# Patient Record
Sex: FEMALE | Race: WHITE | NOT HISPANIC OR LATINO | Employment: UNEMPLOYED | URBAN - METROPOLITAN AREA
[De-identification: names, ages, dates, MRNs, and addresses within clinical notes are randomized per-mention and may not be internally consistent; named-entity substitution may affect disease eponyms.]

---

## 2020-05-06 ENCOUNTER — TELEPHONE (OUTPATIENT)
Dept: OBSTETRICS AND GYNECOLOGY | Facility: CLINIC | Age: 30
End: 2020-05-06

## 2020-05-06 DIAGNOSIS — Z36.3 ENCOUNTER FOR ANTENATAL SCREENING FOR MALFORMATION USING ULTRASOUND: Primary | ICD-10-CM

## 2020-05-06 NOTE — TELEPHONE ENCOUNTER
----- Message from Emily Peoples sent at 5/6/2020 10:56 AM CDT -----  Contact: Self  Type:  Patient Returning Call    Who Called: SOHA STOCK [[id number]]    Who Left Message for Patient: Soha    Does the patient know what this is regarding?:Y    Would the patient rather a call back or a response via [facility]? Call    Best Call Back Number:[phone]    Additional Information:

## 2020-05-06 NOTE — TELEPHONE ENCOUNTER
----- Message from Soha Cedeno MD sent at 5/5/2020 10:23 AM CDT -----  Hi,  She is a pregnancy confirmation. Can we change to VV with tomi mason? Thanks!  Soha

## 2020-05-11 ENCOUNTER — INITIAL PRENATAL (OUTPATIENT)
Dept: OBSTETRICS AND GYNECOLOGY | Facility: CLINIC | Age: 30
End: 2020-05-11
Payer: MEDICAID

## 2020-05-11 ENCOUNTER — PROCEDURE VISIT (OUTPATIENT)
Dept: OBSTETRICS AND GYNECOLOGY | Facility: CLINIC | Age: 30
End: 2020-05-11
Payer: MEDICAID

## 2020-05-11 ENCOUNTER — LAB VISIT (OUTPATIENT)
Dept: LAB | Facility: OTHER | Age: 30
End: 2020-05-11
Attending: OBSTETRICS & GYNECOLOGY
Payer: MEDICAID

## 2020-05-11 VITALS — DIASTOLIC BLOOD PRESSURE: 70 MMHG | SYSTOLIC BLOOD PRESSURE: 118 MMHG | WEIGHT: 177 LBS

## 2020-05-11 DIAGNOSIS — O99.341 DEPRESSION DURING PREGNANCY IN FIRST TRIMESTER: ICD-10-CM

## 2020-05-11 DIAGNOSIS — Z34.90 ENCOUNTER FOR SUPERVISION OF NORMAL PREGNANCY, ANTEPARTUM, UNSPECIFIED GRAVIDITY: Primary | ICD-10-CM

## 2020-05-11 DIAGNOSIS — Z34.90 ENCOUNTER FOR SUPERVISION OF NORMAL PREGNANCY, ANTEPARTUM, UNSPECIFIED GRAVIDITY: ICD-10-CM

## 2020-05-11 DIAGNOSIS — F32.A DEPRESSION DURING PREGNANCY IN FIRST TRIMESTER: ICD-10-CM

## 2020-05-11 DIAGNOSIS — F17.200 SMOKER: ICD-10-CM

## 2020-05-11 DIAGNOSIS — Z36.3 ENCOUNTER FOR ANTENATAL SCREENING FOR MALFORMATION USING ULTRASOUND: ICD-10-CM

## 2020-05-11 LAB
ABO + RH BLD: NORMAL
BASOPHILS # BLD AUTO: 0.02 K/UL (ref 0–0.2)
BASOPHILS NFR BLD: 0.4 % (ref 0–1.9)
BLD GP AB SCN CELLS X3 SERPL QL: NORMAL
DIFFERENTIAL METHOD: NORMAL
EOSINOPHIL # BLD AUTO: 0.1 K/UL (ref 0–0.5)
EOSINOPHIL NFR BLD: 0.9 % (ref 0–8)
ERYTHROCYTE [DISTWIDTH] IN BLOOD BY AUTOMATED COUNT: 13.2 % (ref 11.5–14.5)
ESTIMATED AVG GLUCOSE: 94 MG/DL (ref 68–131)
HBA1C MFR BLD HPLC: 4.9 % (ref 4–5.6)
HCT VFR BLD AUTO: 38.7 % (ref 37–48.5)
HGB BLD-MCNC: 12.7 G/DL (ref 12–16)
IMM GRANULOCYTES # BLD AUTO: 0.01 K/UL (ref 0–0.04)
IMM GRANULOCYTES NFR BLD AUTO: 0.2 % (ref 0–0.5)
LYMPHOCYTES # BLD AUTO: 1.8 K/UL (ref 1–4.8)
LYMPHOCYTES NFR BLD: 32.8 % (ref 18–48)
MCH RBC QN AUTO: 29.6 PG (ref 27–31)
MCHC RBC AUTO-ENTMCNC: 32.8 G/DL (ref 32–36)
MCV RBC AUTO: 90 FL (ref 82–98)
MONOCYTES # BLD AUTO: 0.4 K/UL (ref 0.3–1)
MONOCYTES NFR BLD: 7.9 % (ref 4–15)
NEUTROPHILS # BLD AUTO: 3.1 K/UL (ref 1.8–7.7)
NEUTROPHILS NFR BLD: 57.8 % (ref 38–73)
NRBC BLD-RTO: 0 /100 WBC
PLATELET # BLD AUTO: 272 K/UL (ref 150–350)
PMV BLD AUTO: 9.9 FL (ref 9.2–12.9)
RBC # BLD AUTO: 4.29 M/UL (ref 4–5.4)
WBC # BLD AUTO: 5.42 K/UL (ref 3.9–12.7)

## 2020-05-11 PROCEDURE — 99999 PR PBB SHADOW E&M-EST. PATIENT-LVL II: CPT | Mod: PBBFAC,,, | Performed by: OBSTETRICS & GYNECOLOGY

## 2020-05-11 PROCEDURE — 83036 HEMOGLOBIN GLYCOSYLATED A1C: CPT

## 2020-05-11 PROCEDURE — 87491 CHLMYD TRACH DNA AMP PROBE: CPT

## 2020-05-11 PROCEDURE — 99204 PR OFFICE/OUTPT VISIT, NEW, LEVL IV, 45-59 MIN: ICD-10-PCS | Mod: TH,S$PBB,, | Performed by: OBSTETRICS & GYNECOLOGY

## 2020-05-11 PROCEDURE — 88175 CYTOPATH C/V AUTO FLUID REDO: CPT

## 2020-05-11 PROCEDURE — 86703 HIV-1/HIV-2 1 RESULT ANTBDY: CPT

## 2020-05-11 PROCEDURE — 86592 SYPHILIS TEST NON-TREP QUAL: CPT

## 2020-05-11 PROCEDURE — 85025 COMPLETE CBC W/AUTO DIFF WBC: CPT

## 2020-05-11 PROCEDURE — 36415 COLL VENOUS BLD VENIPUNCTURE: CPT

## 2020-05-11 PROCEDURE — 86762 RUBELLA ANTIBODY: CPT

## 2020-05-11 PROCEDURE — 83020 HEMOGLOBIN ELECTROPHORESIS: CPT

## 2020-05-11 PROCEDURE — 76801 OB US < 14 WKS SINGLE FETUS: CPT | Mod: 26,S$PBB,, | Performed by: OBSTETRICS & GYNECOLOGY

## 2020-05-11 PROCEDURE — 99204 OFFICE O/P NEW MOD 45 MIN: CPT | Mod: TH,S$PBB,, | Performed by: OBSTETRICS & GYNECOLOGY

## 2020-05-11 PROCEDURE — 99212 OFFICE O/P EST SF 10 MIN: CPT | Mod: PBBFAC,TH,25 | Performed by: OBSTETRICS & GYNECOLOGY

## 2020-05-11 PROCEDURE — 87340 HEPATITIS B SURFACE AG IA: CPT

## 2020-05-11 PROCEDURE — 76801 PR US, OB <14WKS, TRANSABD, SINGLE GESTATION: ICD-10-PCS | Mod: 26,S$PBB,, | Performed by: OBSTETRICS & GYNECOLOGY

## 2020-05-11 PROCEDURE — 99999 PR PBB SHADOW E&M-EST. PATIENT-LVL II: ICD-10-PCS | Mod: PBBFAC,,, | Performed by: OBSTETRICS & GYNECOLOGY

## 2020-05-11 PROCEDURE — 76801 OB US < 14 WKS SINGLE FETUS: CPT | Mod: PBBFAC | Performed by: OBSTETRICS & GYNECOLOGY

## 2020-05-11 PROCEDURE — 86901 BLOOD TYPING SEROLOGIC RH(D): CPT

## 2020-05-11 PROCEDURE — 87086 URINE CULTURE/COLONY COUNT: CPT

## 2020-05-11 RX ORDER — BUPROPION HYDROCHLORIDE 150 MG/1
300 TABLET ORAL
COMMUNITY
End: 2020-08-05

## 2020-05-11 RX ORDER — CYANOCOBALAMIN (VITAMIN B-12) 500 MCG
TABLET ORAL
COMMUNITY
End: 2023-01-11

## 2020-05-11 NOTE — PROGRESS NOTES
HPI: Pt is a 29 y.o. female who presents complaining of missed menses and (+) home UPT. She denies vaginal bleeding or abdominal pain. She does have nausea and no vomiting. 8+3 weeks on US this AM. EDC 20.    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   SKIN: Denies rash or lesions.   HEAD: Denies head injury or headache.   NODES: Denies enlarged lymph nodes.   CHEST: Denies chest pain or shortness of breath.   CARDIOVASCULAR: Denies palpitations or left sided chest pain.   ABDOMEN: No abdominal pain, constipation, diarrhea or rectal bleeding.   URINARY: No dysuria, hematuria, or burning on urination.  REPRODUCTIVE: See HPI.   BREASTS: Denies pain, lumps, or nipple discharge.   HEMATOLOGIC: No easy bruisability or excessive bleeding.   MUSCULOSKELETAL: Denies joint pain or swelling.   NEUROLOGIC: Denies syncope or weakness.   PSYCHIATRIC: Denies depression, anxiety or mood swings.    PE:   APPEARANCE: Well nourished, well developed, in no acute distress.  AFFECT: WNL, alert and oriented x 3.  SKIN: No acne or hirsutism.  NECK: Neck symmetric, without masses or thyromegaly.  NODES: No inguinal, cervical, axillary or femoral lymph node enlargement.  CHEST: Good respiratory effort.   ABDOMEN: Soft. No tenderness or masses. No hepatosplenomegaly. No hernias.  PELVIC: External female genitalia without lesions. Female hair distribution. Adequate perineal body, Normal urethral meatus. Vagina moist and well rugated without lesions or discharge. There is no significant  cystocele or rectocele. Cervix pink without lesions, discharge or tenderness.   EXTREMITIES: No edema.    PROCEDURES:  - UPT: positive  - Urine dip: negative  - Dating U/S: done      Diagnosis:  1. Encounter for supervision of normal pregnancy, antepartum, unspecified         Plan:     Orders Placed This Encounter    Urine culture    C. trachomatis/N. gonorrhoeae by AMP DNA    HIV 1/2 Ag/Ab (4th Gen)    RPR    Hemoglobin Electrophoresis,Hgb A2 Maykel.    Hepatitis B Surface Antigen    Rubella Antibody, IgG    Hemoglobin A1C    CBC auto differential    Connected MOM Enrollment    Type & Screen    Liquid-Based Pap Smear, Screening    Assign Connected MOM Program Consent Questionnaire     - She is considering genetic testing and will let us know.     Patient was counseled today on routine 1st trimester precautions, including vaginal bleeding and abdominal pain. We also discussed proper weight gain based on the [employer]'s recommendations based on her pre-pregnancy weight, foods to avoid in pregnancy (i.e. sushi, fish that are high in mercury, cold deli meat, and unpasteurized cheeses), environmental precautions such as cat litter, and prenatal vitamin options (i.e. stool softener, DHA).     Total face to face time spent with the patient was 30 minutes and over half spent in counseling on the above related issues.     Advised to stop smoking, risks of this in pregnancy discussed.     Considering seeing midwives - discussed if she wants this would need to transfer to their practice.     Follow-up with me in 4 weeks for OB check - virtual. Connected mom supplies ordered.

## 2020-05-12 LAB
BACTERIA UR CULT: NO GROWTH
FINAL PATHOLOGIC DIAGNOSIS: NORMAL
HBV SURFACE AG SERPL QL IA: NEGATIVE
HIV 1+2 AB+HIV1 P24 AG SERPL QL IA: NEGATIVE
Lab: NORMAL
RPR SER QL: NORMAL
RUBV IGG SER-ACNC: 43.4 IU/ML
RUBV IGG SER-IMP: REACTIVE

## 2020-05-13 LAB
HGB A2 MFR BLD HPLC: 2.5 % (ref 2.2–3.2)
HGB FRACT BLD ELPH-IMP: NORMAL
HGB FRACT BLD ELPH-IMP: NORMAL

## 2020-05-14 ENCOUNTER — PATIENT MESSAGE (OUTPATIENT)
Dept: OBSTETRICS AND GYNECOLOGY | Facility: CLINIC | Age: 30
End: 2020-05-14

## 2020-05-14 ENCOUNTER — TELEPHONE (OUTPATIENT)
Dept: OBSTETRICS AND GYNECOLOGY | Facility: CLINIC | Age: 30
End: 2020-05-14

## 2020-05-14 LAB
C TRACH DNA SPEC QL NAA+PROBE: NOT DETECTED
N GONORRHOEA DNA SPEC QL NAA+PROBE: NOT DETECTED

## 2020-05-14 NOTE — TELEPHONE ENCOUNTER
----- Message from Nahomy Mercado sent at 5/14/2020 10:22 AM CDT -----  Contact: patient  Patient is calling sent a message thru my [facility] and would like for Dr Jeansonne to read it she states it is personal.  If possible can she respond to her on  [facility] message or call her what ever is easier.      Patient can be reached at [phone]

## 2020-05-16 ENCOUNTER — PATIENT MESSAGE (OUTPATIENT)
Dept: ADMINISTRATIVE | Facility: OTHER | Age: 30
End: 2020-05-16

## 2020-05-19 ENCOUNTER — TELEPHONE (OUTPATIENT)
Dept: OBSTETRICS AND GYNECOLOGY | Facility: CLINIC | Age: 30
End: 2020-05-19

## 2020-05-19 NOTE — TELEPHONE ENCOUNTER
----- Message from Eugene Smith sent at 5/19/2020  2:18 PM CDT -----  Contact: pt  Name of Who is Calling: pt    What is the request in detail: is requesting to schedule a maternity 21 test. Please contact to further discuss and advise      Can the clinic reply by [facility]: no    What Number to Call Back if not in [facility]: [phone]

## 2020-05-26 ENCOUNTER — PATIENT MESSAGE (OUTPATIENT)
Dept: OBSTETRICS AND GYNECOLOGY | Facility: CLINIC | Age: 30
End: 2020-05-26

## 2020-05-27 DIAGNOSIS — Z34.01 ENCOUNTER FOR SUPERVISION OF NORMAL FIRST PREGNANCY IN FIRST TRIMESTER: Primary | ICD-10-CM

## 2020-06-01 ENCOUNTER — TELEPHONE (OUTPATIENT)
Dept: OBSTETRICS AND GYNECOLOGY | Facility: CLINIC | Age: 30
End: 2020-06-01

## 2020-06-08 ENCOUNTER — OFFICE VISIT (OUTPATIENT)
Dept: OBSTETRICS AND GYNECOLOGY | Facility: CLINIC | Age: 30
End: 2020-06-08
Payer: MEDICAID

## 2020-06-08 ENCOUNTER — TELEPHONE (OUTPATIENT)
Dept: OBSTETRICS AND GYNECOLOGY | Facility: CLINIC | Age: 30
End: 2020-06-08

## 2020-06-08 DIAGNOSIS — F17.200 SMOKER: ICD-10-CM

## 2020-06-08 DIAGNOSIS — O99.341 DEPRESSION DURING PREGNANCY IN FIRST TRIMESTER: ICD-10-CM

## 2020-06-08 DIAGNOSIS — F32.A DEPRESSION DURING PREGNANCY IN FIRST TRIMESTER: ICD-10-CM

## 2020-06-08 DIAGNOSIS — Z34.01 ENCOUNTER FOR SUPERVISION OF NORMAL FIRST PREGNANCY IN FIRST TRIMESTER: Primary | ICD-10-CM

## 2020-06-08 PROBLEM — Z34.90 PREGNANCY WITH ONE FETUS, ANTEPARTUM: Status: ACTIVE | Noted: 2020-06-08

## 2020-06-08 PROCEDURE — 99213 PR OFFICE/OUTPT VISIT, EST, LEVL III, 20-29 MIN: ICD-10-PCS | Mod: TH,95,, | Performed by: OBSTETRICS & GYNECOLOGY

## 2020-06-08 PROCEDURE — 99213 OFFICE O/P EST LOW 20 MIN: CPT | Mod: TH,95,, | Performed by: OBSTETRICS & GYNECOLOGY

## 2020-06-08 NOTE — PROGRESS NOTES
The patient location is: home  The chief complaint leading to consultation is: SAVI    Visit type: audiovisual    Face to Face time with patient: 15 min  20 minutes of total time spent on the encounter, which includes face to face time and non-face to face time preparing to see the patient (eg, review of tests), Obtaining and/or reviewing separately obtained history, Documenting clinical information in the electronic or other health record, Independently interpreting results (not separately reported) and communicating results to the patient/family/caregiver, or Care coordination (not separately reported).         Each patient to whom he or she provides medical services by telemedicine is:  (1) informed of the relationship between the physician and patient and the respective role of any other health care provider with respect to management of the patient; and (2) notified that he or she may decline to receive medical services by telemedicine and may withdraw from such care at any time.    Notes:   No LOF, Ctx, VB. Wellbutrin increased to 300mg daily recently by psych, doing well on this dose. Had neg Mat21, wanted NT - doing that next week. Wants AFP after 15 weeks. Has apt with midwife today for consult. Discussed if she wants to continue care with them we will transfer apts to them, pt will let us know. She is interested in . Rarely smoking now. Advised to stop completely. Precautions given.

## 2020-06-08 NOTE — TELEPHONE ENCOUNTER
----- Message from Julie R. Jeansonne, MD sent at 6/8/2020  9:47 AM CDT -----  Regarding: apt  Please schedule for in-office visit with me in 4 weeks. Please also schedule her to get AFP lab that day.    Thanks!

## 2020-06-10 ENCOUNTER — PROCEDURE VISIT (OUTPATIENT)
Dept: MATERNAL FETAL MEDICINE | Facility: CLINIC | Age: 30
End: 2020-06-10
Payer: MEDICAID

## 2020-06-10 DIAGNOSIS — Z36.82 ENCOUNTER FOR NUCHAL TRANSLUCENCY TESTING: ICD-10-CM

## 2020-06-10 DIAGNOSIS — Z34.01 ENCOUNTER FOR SUPERVISION OF NORMAL FIRST PREGNANCY IN FIRST TRIMESTER: ICD-10-CM

## 2020-06-10 PROCEDURE — 76813 OB US NUCHAL MEAS 1 GEST: CPT | Mod: 26,S$PBB,, | Performed by: OBSTETRICS & GYNECOLOGY

## 2020-06-10 PROCEDURE — 76813 PR US, OB NUCHAL, TRANSABDOM/TRANSVAG, FIRST GESTATION: ICD-10-PCS | Mod: 26,S$PBB,, | Performed by: OBSTETRICS & GYNECOLOGY

## 2020-06-10 PROCEDURE — 76813 OB US NUCHAL MEAS 1 GEST: CPT | Mod: PBBFAC | Performed by: OBSTETRICS & GYNECOLOGY

## 2020-07-06 ENCOUNTER — LAB VISIT (OUTPATIENT)
Dept: LAB | Facility: OTHER | Age: 30
End: 2020-07-06
Attending: OBSTETRICS & GYNECOLOGY
Payer: MEDICAID

## 2020-07-06 ENCOUNTER — ROUTINE PRENATAL (OUTPATIENT)
Dept: OBSTETRICS AND GYNECOLOGY | Facility: CLINIC | Age: 30
End: 2020-07-06
Payer: MEDICAID

## 2020-07-06 VITALS — DIASTOLIC BLOOD PRESSURE: 66 MMHG | WEIGHT: 188.69 LBS | SYSTOLIC BLOOD PRESSURE: 106 MMHG

## 2020-07-06 DIAGNOSIS — Z34.02 ENCOUNTER FOR SUPERVISION OF NORMAL FIRST PREGNANCY IN SECOND TRIMESTER: ICD-10-CM

## 2020-07-06 DIAGNOSIS — Z34.02 ENCOUNTER FOR SUPERVISION OF NORMAL FIRST PREGNANCY IN SECOND TRIMESTER: Primary | ICD-10-CM

## 2020-07-06 PROCEDURE — 99999 PR PBB SHADOW E&M-EST. PATIENT-LVL II: ICD-10-PCS | Mod: PBBFAC,,, | Performed by: OBSTETRICS & GYNECOLOGY

## 2020-07-06 PROCEDURE — 99999 PR PBB SHADOW E&M-EST. PATIENT-LVL II: CPT | Mod: PBBFAC,,, | Performed by: OBSTETRICS & GYNECOLOGY

## 2020-07-06 PROCEDURE — 99213 OFFICE O/P EST LOW 20 MIN: CPT | Mod: TH,S$PBB,, | Performed by: OBSTETRICS & GYNECOLOGY

## 2020-07-06 PROCEDURE — 99213 PR OFFICE/OUTPT VISIT, EST, LEVL III, 20-29 MIN: ICD-10-PCS | Mod: TH,S$PBB,, | Performed by: OBSTETRICS & GYNECOLOGY

## 2020-07-06 PROCEDURE — 36415 COLL VENOUS BLD VENIPUNCTURE: CPT

## 2020-07-06 PROCEDURE — 82105 ALPHA-FETOPROTEIN SERUM: CPT

## 2020-07-06 PROCEDURE — 99212 OFFICE O/P EST SF 10 MIN: CPT | Mod: PBBFAC,TH | Performed by: OBSTETRICS & GYNECOLOGY

## 2020-07-06 NOTE — PROGRESS NOTES
No LOF, Ctx, VB. Doing well on wellbutrin 300mg. Seeing psych every 3 months. Partner with her today. Anatomy scheduled. Mat 21 was nl, girl. AFP today. Has lymphadenopathy right groin, reassured. Precautions given.

## 2020-07-09 LAB
# FETUSES US: NORMAL
AFP INTERPRETATION: NORMAL
AFP MOM SERPL: 1.2
AFP SERPL-MCNC: 35.6 NG/ML
AFP SERPL-MCNC: NEGATIVE NG/ML
AGE AT DELIVERY: 30
GA (DAYS): 3 D
GA (WEEKS): 16 WK
GESTATIONAL AGE METHOD: NORMAL
IDDM PATIENT QL: NORMAL
SMOKING STATUS FTND: NO

## 2020-07-24 ENCOUNTER — LAB VISIT (OUTPATIENT)
Dept: LAB | Facility: OTHER | Age: 30
End: 2020-07-24
Attending: OBSTETRICS & GYNECOLOGY
Payer: MEDICAID

## 2020-07-24 DIAGNOSIS — R30.0 DYSURIA DURING PREGNANCY IN SECOND TRIMESTER: Primary | ICD-10-CM

## 2020-07-24 DIAGNOSIS — R30.0 DYSURIA DURING PREGNANCY IN SECOND TRIMESTER: ICD-10-CM

## 2020-07-24 DIAGNOSIS — O26.892 DYSURIA DURING PREGNANCY IN SECOND TRIMESTER: ICD-10-CM

## 2020-07-24 DIAGNOSIS — O26.892 DYSURIA DURING PREGNANCY IN SECOND TRIMESTER: Primary | ICD-10-CM

## 2020-07-24 PROCEDURE — 87086 URINE CULTURE/COLONY COUNT: CPT

## 2020-07-26 LAB — BACTERIA UR CULT: NO GROWTH

## 2020-07-27 ENCOUNTER — PROCEDURE VISIT (OUTPATIENT)
Dept: MATERNAL FETAL MEDICINE | Facility: CLINIC | Age: 30
End: 2020-07-27
Payer: MEDICAID

## 2020-07-27 DIAGNOSIS — O35.03X0 CHOROID PLEXUS CYST OF FETUS AFFECTING CARE OF MOTHER, ANTEPARTUM, SINGLE OR UNSPECIFIED FETUS: ICD-10-CM

## 2020-07-27 DIAGNOSIS — Z34.01 ENCOUNTER FOR SUPERVISION OF NORMAL FIRST PREGNANCY IN FIRST TRIMESTER: ICD-10-CM

## 2020-07-27 PROCEDURE — 76811 PR US, OB FETAL EVAL & EXAM, TRANSABDOM,FIRST GESTATION: ICD-10-PCS | Mod: 26,S$PBB,, | Performed by: OBSTETRICS & GYNECOLOGY

## 2020-07-27 PROCEDURE — 76811 OB US DETAILED SNGL FETUS: CPT | Mod: 26,S$PBB,, | Performed by: OBSTETRICS & GYNECOLOGY

## 2020-07-27 PROCEDURE — 76811 OB US DETAILED SNGL FETUS: CPT | Mod: PBBFAC | Performed by: OBSTETRICS & GYNECOLOGY

## 2020-07-27 NOTE — PROGRESS NOTES
Please see imaging tab for Viewpoint study performed today  See targeted US note   A possible small resolving CP cyst was seen.  M 21 testing was normal.  No fetal structural abnormalities were seen.  No additional followup is needed.

## 2020-08-05 ENCOUNTER — ROUTINE PRENATAL (OUTPATIENT)
Dept: OBSTETRICS AND GYNECOLOGY | Facility: CLINIC | Age: 30
End: 2020-08-05
Payer: MEDICAID

## 2020-08-05 VITALS — SYSTOLIC BLOOD PRESSURE: 110 MMHG | WEIGHT: 199.31 LBS | DIASTOLIC BLOOD PRESSURE: 60 MMHG

## 2020-08-05 DIAGNOSIS — Z34.02 ENCOUNTER FOR SUPERVISION OF NORMAL FIRST PREGNANCY IN SECOND TRIMESTER: Primary | ICD-10-CM

## 2020-08-05 PROCEDURE — 99212 OFFICE O/P EST SF 10 MIN: CPT | Mod: PBBFAC,TH | Performed by: OBSTETRICS & GYNECOLOGY

## 2020-08-05 PROCEDURE — 99213 PR OFFICE/OUTPT VISIT, EST, LEVL III, 20-29 MIN: ICD-10-PCS | Mod: TH,S$PBB,, | Performed by: OBSTETRICS & GYNECOLOGY

## 2020-08-05 PROCEDURE — 99999 PR PBB SHADOW E&M-EST. PATIENT-LVL II: CPT | Mod: PBBFAC,,, | Performed by: OBSTETRICS & GYNECOLOGY

## 2020-08-05 PROCEDURE — 99213 OFFICE O/P EST LOW 20 MIN: CPT | Mod: TH,S$PBB,, | Performed by: OBSTETRICS & GYNECOLOGY

## 2020-08-05 PROCEDURE — 99999 PR PBB SHADOW E&M-EST. PATIENT-LVL II: ICD-10-PCS | Mod: PBBFAC,,, | Performed by: OBSTETRICS & GYNECOLOGY

## 2020-08-05 RX ORDER — BUPROPION HYDROCHLORIDE 300 MG/1
TABLET ORAL
COMMUNITY
Start: 2020-08-04

## 2020-08-05 NOTE — PROGRESS NOTES
Good FM, no LOF, Ctx, VB.   Taking valtrex, no lesions. Glucose, tdap, cbc next visit. Taking wellbutrin 300, not smoking. Precautions given.

## 2020-08-31 ENCOUNTER — PROCEDURE VISIT (OUTPATIENT)
Dept: MATERNAL FETAL MEDICINE | Facility: CLINIC | Age: 30
End: 2020-08-31
Payer: MEDICAID

## 2020-08-31 PROCEDURE — 76816 PR  US,PREGNANT UTERUS,F/U,TRANSABD APP: ICD-10-PCS | Mod: 26,S$PBB,, | Performed by: OBSTETRICS & GYNECOLOGY

## 2020-08-31 PROCEDURE — 76816 OB US FOLLOW-UP PER FETUS: CPT | Mod: 26,S$PBB,, | Performed by: OBSTETRICS & GYNECOLOGY

## 2020-08-31 PROCEDURE — 76816 OB US FOLLOW-UP PER FETUS: CPT | Mod: PBBFAC | Performed by: OBSTETRICS & GYNECOLOGY

## 2020-09-04 ENCOUNTER — CLINICAL SUPPORT (OUTPATIENT)
Dept: OBSTETRICS AND GYNECOLOGY | Facility: CLINIC | Age: 30
End: 2020-09-04
Payer: MEDICAID

## 2020-09-04 ENCOUNTER — LAB VISIT (OUTPATIENT)
Dept: LAB | Facility: OTHER | Age: 30
End: 2020-09-04
Attending: OBSTETRICS & GYNECOLOGY
Payer: MEDICAID

## 2020-09-04 ENCOUNTER — ROUTINE PRENATAL (OUTPATIENT)
Dept: OBSTETRICS AND GYNECOLOGY | Facility: CLINIC | Age: 30
End: 2020-09-04
Payer: MEDICAID

## 2020-09-04 VITALS — SYSTOLIC BLOOD PRESSURE: 124 MMHG | DIASTOLIC BLOOD PRESSURE: 62 MMHG | WEIGHT: 213.63 LBS

## 2020-09-04 DIAGNOSIS — Z34.02 ENCOUNTER FOR SUPERVISION OF NORMAL FIRST PREGNANCY IN SECOND TRIMESTER: Primary | ICD-10-CM

## 2020-09-04 DIAGNOSIS — Z34.02 ENCOUNTER FOR SUPERVISION OF NORMAL FIRST PREGNANCY IN SECOND TRIMESTER: ICD-10-CM

## 2020-09-04 DIAGNOSIS — Z23 NEED FOR DIPHTHERIA-TETANUS-PERTUSSIS (TDAP) VACCINE: Primary | ICD-10-CM

## 2020-09-04 LAB
BASOPHILS # BLD AUTO: 0.02 K/UL (ref 0–0.2)
BASOPHILS NFR BLD: 0.3 % (ref 0–1.9)
DIFFERENTIAL METHOD: ABNORMAL
EOSINOPHIL # BLD AUTO: 0.1 K/UL (ref 0–0.5)
EOSINOPHIL NFR BLD: 1.2 % (ref 0–8)
ERYTHROCYTE [DISTWIDTH] IN BLOOD BY AUTOMATED COUNT: 13.2 % (ref 11.5–14.5)
GLUCOSE SERPL-MCNC: 91 MG/DL (ref 70–140)
HCT VFR BLD AUTO: 33.4 % (ref 37–48.5)
HGB BLD-MCNC: 10.8 G/DL (ref 12–16)
IMM GRANULOCYTES # BLD AUTO: 0.04 K/UL (ref 0–0.04)
IMM GRANULOCYTES NFR BLD AUTO: 0.6 % (ref 0–0.5)
LYMPHOCYTES # BLD AUTO: 1.6 K/UL (ref 1–4.8)
LYMPHOCYTES NFR BLD: 24.2 % (ref 18–48)
MCH RBC QN AUTO: 29.6 PG (ref 27–31)
MCHC RBC AUTO-ENTMCNC: 32.3 G/DL (ref 32–36)
MCV RBC AUTO: 92 FL (ref 82–98)
MONOCYTES # BLD AUTO: 0.5 K/UL (ref 0.3–1)
MONOCYTES NFR BLD: 6.8 % (ref 4–15)
NEUTROPHILS # BLD AUTO: 4.5 K/UL (ref 1.8–7.7)
NEUTROPHILS NFR BLD: 66.9 % (ref 38–73)
NRBC BLD-RTO: 0 /100 WBC
PLATELET # BLD AUTO: 212 K/UL (ref 150–350)
PMV BLD AUTO: 9.7 FL (ref 9.2–12.9)
RBC # BLD AUTO: 3.65 M/UL (ref 4–5.4)
WBC # BLD AUTO: 6.78 K/UL (ref 3.9–12.7)

## 2020-09-04 PROCEDURE — 82950 GLUCOSE TEST: CPT

## 2020-09-04 PROCEDURE — 90471 IMMUNIZATION ADMIN: CPT | Mod: PBBFAC

## 2020-09-04 PROCEDURE — 36415 COLL VENOUS BLD VENIPUNCTURE: CPT

## 2020-09-04 PROCEDURE — 99213 OFFICE O/P EST LOW 20 MIN: CPT | Mod: PBBFAC,27,TH | Performed by: OBSTETRICS & GYNECOLOGY

## 2020-09-04 PROCEDURE — 99999 PR PBB SHADOW E&M-EST. PATIENT-LVL III: CPT | Mod: PBBFAC,,, | Performed by: OBSTETRICS & GYNECOLOGY

## 2020-09-04 PROCEDURE — 85025 COMPLETE CBC W/AUTO DIFF WBC: CPT

## 2020-09-04 PROCEDURE — 99999 PR PBB SHADOW E&M-EST. PATIENT-LVL III: ICD-10-PCS | Mod: PBBFAC,,, | Performed by: OBSTETRICS & GYNECOLOGY

## 2020-09-04 PROCEDURE — 99999 PR PBB SHADOW E&M-EST. PATIENT-LVL I: ICD-10-PCS | Mod: PBBFAC,,,

## 2020-09-04 PROCEDURE — 99211 OFF/OP EST MAY X REQ PHY/QHP: CPT | Mod: PBBFAC

## 2020-09-04 PROCEDURE — 99213 PR OFFICE/OUTPT VISIT, EST, LEVL III, 20-29 MIN: ICD-10-PCS | Mod: TH,S$PBB,, | Performed by: OBSTETRICS & GYNECOLOGY

## 2020-09-04 PROCEDURE — 99999 PR PBB SHADOW E&M-EST. PATIENT-LVL I: CPT | Mod: PBBFAC,,,

## 2020-09-04 PROCEDURE — 99213 OFFICE O/P EST LOW 20 MIN: CPT | Mod: TH,S$PBB,, | Performed by: OBSTETRICS & GYNECOLOGY

## 2020-09-04 NOTE — PROGRESS NOTES
Good FM, no LOF, Ctx, VB.   Tdap, glucose, cbc today. Ate bagel prior to test. If high, would like to repeat one hour again first before 3 hour.

## 2020-09-04 NOTE — PROGRESS NOTES
Here for TDAP injection. Patient without complaint of pain at this time, Injection given. Tolerated well. No pain noted after injection.  Advised to wait in lobby 15 minutes and report any adverse reactions.     Site: DOTTY    Baby Friendly Handout Given to Patient

## 2020-09-25 ENCOUNTER — ROUTINE PRENATAL (OUTPATIENT)
Dept: OBSTETRICS AND GYNECOLOGY | Facility: CLINIC | Age: 30
End: 2020-09-25
Payer: MEDICAID

## 2020-09-25 VITALS — SYSTOLIC BLOOD PRESSURE: 98 MMHG | DIASTOLIC BLOOD PRESSURE: 62 MMHG | WEIGHT: 219.38 LBS

## 2020-09-25 DIAGNOSIS — Z34.03 ENCOUNTER FOR SUPERVISION OF NORMAL FIRST PREGNANCY IN THIRD TRIMESTER: Primary | ICD-10-CM

## 2020-09-25 PROCEDURE — 99213 OFFICE O/P EST LOW 20 MIN: CPT | Mod: TH,S$PBB,, | Performed by: OBSTETRICS & GYNECOLOGY

## 2020-09-25 PROCEDURE — 99999 PR PBB SHADOW E&M-EST. PATIENT-LVL II: ICD-10-PCS | Mod: PBBFAC,,, | Performed by: OBSTETRICS & GYNECOLOGY

## 2020-09-25 PROCEDURE — 99212 OFFICE O/P EST SF 10 MIN: CPT | Mod: PBBFAC,TH | Performed by: OBSTETRICS & GYNECOLOGY

## 2020-09-25 PROCEDURE — 99999 PR PBB SHADOW E&M-EST. PATIENT-LVL II: CPT | Mod: PBBFAC,,, | Performed by: OBSTETRICS & GYNECOLOGY

## 2020-09-25 PROCEDURE — 99213 PR OFFICE/OUTPT VISIT, EST, LEVL III, 20-29 MIN: ICD-10-PCS | Mod: TH,S$PBB,, | Performed by: OBSTETRICS & GYNECOLOGY

## 2020-09-25 RX ORDER — VALACYCLOVIR HYDROCHLORIDE 500 MG/1
TABLET, FILM COATED ORAL
COMMUNITY
Start: 2020-09-01 | End: 2023-01-11

## 2020-09-25 NOTE — PROGRESS NOTES
Good FM, no LOF, Ctx, VB.   Passed one hour. Discussed weight gain today and changes in diet and risks of too much weight gain in pregnancy. Doing well on wellbutrin. Precautions given.

## 2020-09-26 ENCOUNTER — PATIENT MESSAGE (OUTPATIENT)
Dept: OBSTETRICS AND GYNECOLOGY | Facility: CLINIC | Age: 30
End: 2020-09-26

## 2020-10-05 ENCOUNTER — TELEPHONE (OUTPATIENT)
Dept: OBSTETRICS AND GYNECOLOGY | Facility: CLINIC | Age: 30
End: 2020-10-05

## 2020-10-05 ENCOUNTER — PATIENT MESSAGE (OUTPATIENT)
Dept: OBSTETRICS AND GYNECOLOGY | Facility: CLINIC | Age: 30
End: 2020-10-05

## 2020-10-05 NOTE — TELEPHONE ENCOUNTER
Left message informing patient that she is scheduled for appt with Dr. Jeansonne, on Oct. 9th, for 3:30 pm.     ----- Message from Lesley Lowe sent at 10/5/2020 11:37 AM CDT -----  Regarding: Callback  Name of Who is Calling: PRITESH STOCK What is the request in detail:Pt is requesting a callback concerning her appts she said she should have an appt this week but no longer see it    Can the clinic reply by [facility]: No  What Number to Call Back if not in [facility]: [phone]

## 2020-10-09 ENCOUNTER — ROUTINE PRENATAL (OUTPATIENT)
Dept: OBSTETRICS AND GYNECOLOGY | Facility: CLINIC | Age: 30
End: 2020-10-09
Payer: MEDICAID

## 2020-10-09 VITALS — DIASTOLIC BLOOD PRESSURE: 70 MMHG | SYSTOLIC BLOOD PRESSURE: 120 MMHG | WEIGHT: 219.81 LBS

## 2020-10-09 DIAGNOSIS — Z34.03 ENCOUNTER FOR SUPERVISION OF NORMAL FIRST PREGNANCY IN THIRD TRIMESTER: Primary | ICD-10-CM

## 2020-10-09 PROCEDURE — 99212 OFFICE O/P EST SF 10 MIN: CPT | Mod: PBBFAC,TH | Performed by: OBSTETRICS & GYNECOLOGY

## 2020-10-09 PROCEDURE — 99999 PR PBB SHADOW E&M-EST. PATIENT-LVL II: ICD-10-PCS | Mod: PBBFAC,,, | Performed by: OBSTETRICS & GYNECOLOGY

## 2020-10-09 PROCEDURE — 99213 PR OFFICE/OUTPT VISIT, EST, LEVL III, 20-29 MIN: ICD-10-PCS | Mod: TH,S$PBB,, | Performed by: OBSTETRICS & GYNECOLOGY

## 2020-10-09 PROCEDURE — 99999 PR PBB SHADOW E&M-EST. PATIENT-LVL II: CPT | Mod: PBBFAC,,, | Performed by: OBSTETRICS & GYNECOLOGY

## 2020-10-09 PROCEDURE — 99213 OFFICE O/P EST LOW 20 MIN: CPT | Mod: TH,S$PBB,, | Performed by: OBSTETRICS & GYNECOLOGY

## 2020-10-12 ENCOUNTER — IMMUNIZATION (OUTPATIENT)
Dept: PHARMACY | Facility: CLINIC | Age: 30
End: 2020-10-12
Payer: MEDICAID

## 2020-10-18 ENCOUNTER — HOSPITAL ENCOUNTER (EMERGENCY)
Facility: OTHER | Age: 30
Discharge: HOME | End: 2020-10-18
Attending: OBSTETRICS & GYNECOLOGY
Payer: MEDICAID

## 2020-10-18 VITALS
DIASTOLIC BLOOD PRESSURE: 68 MMHG | TEMPERATURE: 98 F | RESPIRATION RATE: 20 BRPM | SYSTOLIC BLOOD PRESSURE: 118 MMHG | HEART RATE: 81 BPM | OXYGEN SATURATION: 100 %

## 2020-10-18 DIAGNOSIS — Z3A.31 31 WEEKS GESTATION OF PREGNANCY: ICD-10-CM

## 2020-10-18 DIAGNOSIS — R10.9 ABDOMINAL CRAMPING: Primary | ICD-10-CM

## 2020-10-18 PROCEDURE — 59025 PR FETAL 2N-STRESS TEST: ICD-10-PCS | Mod: 26,,, | Performed by: OBSTETRICS & GYNECOLOGY

## 2020-10-18 PROCEDURE — 99284 EMERGENCY DEPT VISIT MOD MDM: CPT | Mod: 25

## 2020-10-18 PROCEDURE — 59025 FETAL NON-STRESS TEST: CPT | Mod: 26,,, | Performed by: OBSTETRICS & GYNECOLOGY

## 2020-10-18 PROCEDURE — 99283 PR EMERGENCY DEPT VISIT,LEVEL III: ICD-10-PCS | Mod: 25,,, | Performed by: OBSTETRICS & GYNECOLOGY

## 2020-10-18 PROCEDURE — 59025 FETAL NON-STRESS TEST: CPT

## 2020-10-18 PROCEDURE — 99283 EMERGENCY DEPT VISIT LOW MDM: CPT | Mod: 25,,, | Performed by: OBSTETRICS & GYNECOLOGY

## 2020-10-18 NOTE — ED PROVIDER NOTES
Encounter Date: 10/18/2020       History     Chief Complaint   Patient presents with    Abdominal Cramping     Soha Brown is a 30 y.o. F at 31w2d presents complaining of abdominal cramping. The patient reports that she was out to dinner when she started experiencing abdominal cramping. States the pain was across her abdomen and in her lower back. Describes it as a constant pain that lasted for about a hour. She states she drank a lot of water when she got home and laid down, which helped relieve the pain. Reports that she is not currently feeling the pain.    This IUP is complicated by HSV2, tobacco use, depression/anxiety.  Patient denies contractions, denies vaginal bleeding, denies LOF.   Fetal Movement: normal.          Review of patient's allergies indicates:  No Known Allergies  Past Medical History:   Diagnosis Date    Abnormal Pap smear of cervix     Herpes simplex virus (HSV) infection      No past surgical history on file.  Family History   Problem Relation Age of Onset    Diabetes Paternal Grandmother     Miscarriages / Stillbirths Maternal Grandmother     Heart disease Maternal Grandfather     Diabetes Father      Social History     Tobacco Use    Smoking status: Light Tobacco Smoker     Packs/day: 0.25     Types: Cigarettes    Smokeless tobacco: Never Used   Substance Use Topics    Alcohol use: Not Currently    Drug use: Not Currently     Types: Marijuana     Review of Systems   Constitutional: Negative for chills, fatigue and fever.   HENT: Negative for congestion, rhinorrhea and sore throat.    Eyes: Negative for visual disturbance.   Respiratory: Negative for cough, chest tightness and shortness of breath.    Cardiovascular: Negative for chest pain and leg swelling.   Gastrointestinal: Positive for abdominal pain. Negative for nausea and vomiting.   Genitourinary: Negative for dysuria, pelvic pain, vaginal bleeding and vaginal discharge.   Musculoskeletal: Negative for back  pain.   Skin: Negative for rash.   Neurological: Negative for dizziness and headaches.       Physical Exam     Initial Vitals [10/18/20 1712]   BP Pulse Resp Temp SpO2   118/68 81 20 97.6 °F (36.4 °C) 100 %      MAP       --         Physical Exam    Vitals reviewed.  Constitutional: She appears well-developed and well-nourished. No distress.   HENT:   Head: Normocephalic and atraumatic.   Eyes: Conjunctivae and EOM are normal.   Neck: Normal range of motion. No thyromegaly present.   Cardiovascular: Normal rate.   Pulmonary/Chest: No respiratory distress.   Normal work of breathing.   Abdominal: Soft. There is no abdominal tenderness. There is no rebound and no guarding.   Gravid uterus.   Genitourinary:    Uterus normal.     Musculoskeletal: Normal range of motion. No tenderness or edema.   Neurological: She is alert and oriented to person, place, and time.   Skin: Skin is warm and dry.   Psychiatric: She has a normal mood and affect. Thought content normal.     OB LABOR EXAM:       Method: Sterile vaginal exam per MD.   Vaginal Bleeding: none present.     Dilatation: 0.   Station: -3.   Effacement: 50%.             ED Course   Obtain Fetal nonstress test (NST)    Date/Time: 10/18/2020 5:10 PM  Performed by: Arline Guallpa MD  Authorized by: Yasmany Britton MD     Nonstress Test:     Variability:  6-25 BPM    Decelerations:  None    Accelerations:  10 bpm    Baseline:  140    Contractions:  Not present  Biophysical Profile:     Nonstress Test Interpretation: reactive      Overall Impression:  Reassuring      Labs Reviewed - No data to display       Imaging Results    None          Medical Decision Making:   Initial Assessment:   Soha Brown is a 30 y.o. F at 31w2d presents complaining of abdominal cramping.  VSS, afebrile  NST reactive and reassuring  Urine dip clean, no nitrites or leukocytes  SVE closed/thick/high  PO hydration  Patient declines Tylenol at this time  Patient stable for  discharge home. Labor and pre eclampsia precautions given. Patient verbalized understanding, all questions answered. Plan discussed with hospitalist on call, who is in agreement with plan.                   Attending Attestation:   Physician Attestation Statement for Resident:  As the supervising MD   Physician Attestation Statement: I have personally seen and examined this patient.   I agree with the above history. -:   As the supervising MD I agree with the above PE.    As the supervising MD I agree with the above treatment, course, plan, and disposition.   -: Patient evaluated and found to be stable, agree with resident's assessment and plan.  I was personally present during the critical portions of the procedure(s) performed by the resident and was immediately available in the ED to provide services and assistance as needed during the entire procedure.  I have reviewed the following: old records at this facility.                              Clinical Impression:     ICD-10-CM ICD-9-CM   1. Abdominal cramping  R10.9 789.00   2. 31 weeks gestation of pregnancy  Z3A.31 V22.2                      Disposition:   Disposition: Discharged  Condition: Stable     ED Disposition Condition    Discharge Stable        ED Prescriptions     None        Follow-up Information    None                       Yasmany Britton M.D.  OB/GYN PGY-1                 Yasmany Britton MD  Resident  10/18/20 1735       Arline Guallpa MD  10/23/20 [id number]

## 2020-10-18 NOTE — DISCHARGE INSTRUCTIONS
Labor Precautions  Return if you experience contractions, uterine tightening or cramps (more than 4 in 1 hour for 2 consecutive hours)  Backache that comes and goes  Pelvic pressure  Change in vaginal discharge  Vaginal Bleeding  Leaking of fluid    Call the OB Clinic([phone]) during regular business hours or L&D([phone]) after hours for any questions or concerns  Keep previously scheduled clinic appointment    Drink 8-10 glasses of water a day

## 2020-10-22 ENCOUNTER — ROUTINE PRENATAL (OUTPATIENT)
Dept: OBSTETRICS AND GYNECOLOGY | Facility: CLINIC | Age: 30
End: 2020-10-22
Payer: MEDICAID

## 2020-10-22 VITALS — DIASTOLIC BLOOD PRESSURE: 64 MMHG | SYSTOLIC BLOOD PRESSURE: 108 MMHG | WEIGHT: 223.56 LBS

## 2020-10-22 DIAGNOSIS — Z34.03 ENCOUNTER FOR SUPERVISION OF NORMAL FIRST PREGNANCY IN THIRD TRIMESTER: Primary | ICD-10-CM

## 2020-10-22 PROCEDURE — 99212 OFFICE O/P EST SF 10 MIN: CPT | Mod: PBBFAC,TH | Performed by: OBSTETRICS & GYNECOLOGY

## 2020-10-22 PROCEDURE — 99213 OFFICE O/P EST LOW 20 MIN: CPT | Mod: TH,S$PBB,, | Performed by: OBSTETRICS & GYNECOLOGY

## 2020-10-22 PROCEDURE — 99999 PR PBB SHADOW E&M-EST. PATIENT-LVL II: ICD-10-PCS | Mod: PBBFAC,,, | Performed by: OBSTETRICS & GYNECOLOGY

## 2020-10-22 PROCEDURE — 99213 PR OFFICE/OUTPT VISIT, EST, LEVL III, 20-29 MIN: ICD-10-PCS | Mod: TH,S$PBB,, | Performed by: OBSTETRICS & GYNECOLOGY

## 2020-10-22 PROCEDURE — 99999 PR PBB SHADOW E&M-EST. PATIENT-LVL II: CPT | Mod: PBBFAC,,, | Performed by: OBSTETRICS & GYNECOLOGY

## 2020-10-22 NOTE — PROGRESS NOTES
Good FM, no LOF, Ctx, VB.   Had flu shot last visit. Doing well on wellbutrin 300. In the process of moving - diet has not been great because of this. Discussed weight gain since last visit. Precautions given.

## 2020-11-06 ENCOUNTER — ROUTINE PRENATAL (OUTPATIENT)
Dept: OBSTETRICS AND GYNECOLOGY | Facility: CLINIC | Age: 30
End: 2020-11-06
Payer: MEDICAID

## 2020-11-06 VITALS — DIASTOLIC BLOOD PRESSURE: 68 MMHG | WEIGHT: 230.19 LBS | SYSTOLIC BLOOD PRESSURE: 120 MMHG

## 2020-11-06 DIAGNOSIS — Z34.03 ENCOUNTER FOR SUPERVISION OF NORMAL FIRST PREGNANCY IN THIRD TRIMESTER: Primary | ICD-10-CM

## 2020-11-06 DIAGNOSIS — O99.210 OBESITY IN PREGNANCY: ICD-10-CM

## 2020-11-06 PROCEDURE — 99212 OFFICE O/P EST SF 10 MIN: CPT | Mod: PBBFAC,TH | Performed by: OBSTETRICS & GYNECOLOGY

## 2020-11-06 PROCEDURE — 99999 PR PBB SHADOW E&M-EST. PATIENT-LVL II: ICD-10-PCS | Mod: PBBFAC,,, | Performed by: OBSTETRICS & GYNECOLOGY

## 2020-11-06 PROCEDURE — 99213 OFFICE O/P EST LOW 20 MIN: CPT | Mod: TH,S$PBB,, | Performed by: OBSTETRICS & GYNECOLOGY

## 2020-11-06 PROCEDURE — 99213 PR OFFICE/OUTPT VISIT, EST, LEVL III, 20-29 MIN: ICD-10-PCS | Mod: TH,S$PBB,, | Performed by: OBSTETRICS & GYNECOLOGY

## 2020-11-06 PROCEDURE — 99999 PR PBB SHADOW E&M-EST. PATIENT-LVL II: CPT | Mod: PBBFAC,,, | Performed by: OBSTETRICS & GYNECOLOGY

## 2020-11-06 NOTE — PROGRESS NOTES
Good FM, no LOF, Ctx, VB.   Discussed risks of continued weight gain. Pt has been moving and hard to find time to cook. Will do cultures next visit. Precautions given.

## 2020-11-20 ENCOUNTER — ROUTINE PRENATAL (OUTPATIENT)
Dept: OBSTETRICS AND GYNECOLOGY | Facility: CLINIC | Age: 30
End: 2020-11-20
Payer: MEDICAID

## 2020-11-20 VITALS — SYSTOLIC BLOOD PRESSURE: 120 MMHG | WEIGHT: 231.5 LBS | DIASTOLIC BLOOD PRESSURE: 80 MMHG

## 2020-11-20 DIAGNOSIS — Z34.03 ENCOUNTER FOR SUPERVISION OF NORMAL FIRST PREGNANCY IN THIRD TRIMESTER: Primary | ICD-10-CM

## 2020-11-20 DIAGNOSIS — N89.8 VAGINAL LESION: ICD-10-CM

## 2020-11-20 DIAGNOSIS — N89.8 VAGINAL DISCHARGE: ICD-10-CM

## 2020-11-20 PROCEDURE — 87510 GARDNER VAG DNA DIR PROBE: CPT

## 2020-11-20 PROCEDURE — 99213 OFFICE O/P EST LOW 20 MIN: CPT | Mod: TH,S$PBB,, | Performed by: OBSTETRICS & GYNECOLOGY

## 2020-11-20 PROCEDURE — 87480 CANDIDA DNA DIR PROBE: CPT

## 2020-11-20 PROCEDURE — 99999 PR PBB SHADOW E&M-EST. PATIENT-LVL II: ICD-10-PCS | Mod: PBBFAC,,, | Performed by: OBSTETRICS & GYNECOLOGY

## 2020-11-20 PROCEDURE — 99999 PR PBB SHADOW E&M-EST. PATIENT-LVL II: CPT | Mod: PBBFAC,,, | Performed by: OBSTETRICS & GYNECOLOGY

## 2020-11-20 PROCEDURE — 87081 CULTURE SCREEN ONLY: CPT

## 2020-11-20 PROCEDURE — 99212 OFFICE O/P EST SF 10 MIN: CPT | Mod: PBBFAC,TH | Performed by: OBSTETRICS & GYNECOLOGY

## 2020-11-20 PROCEDURE — 87529 HSV DNA AMP PROBE: CPT

## 2020-11-20 PROCEDURE — 99213 PR OFFICE/OUTPT VISIT, EST, LEVL III, 20-29 MIN: ICD-10-PCS | Mod: TH,S$PBB,, | Performed by: OBSTETRICS & GYNECOLOGY

## 2020-11-20 NOTE — PROGRESS NOTES
"Good FM, no LOF, Ctx, VB.   Taking valtrex 500 daily, advised to increase to 2x daily. Has had vaginal irritation recently. Also thinks she has a "pimple" on her right buttocks but wants to make sure not HSV outbreak. On exam, appears to be folliculitis but swab obtained. GBS done today. Doing well on Wellbutrin. Precautions given. Wants epidural.     "

## 2020-11-21 LAB
CANDIDA RRNA VAG QL PROBE: NEGATIVE
G VAGINALIS RRNA GENITAL QL PROBE: NEGATIVE
T VAGINALIS RRNA GENITAL QL PROBE: NEGATIVE

## 2020-11-23 LAB
BACTERIA SPEC AEROBE CULT: NORMAL
HSV1 DNA SPEC QL NAA+PROBE: NEGATIVE
HSV2 DNA SPEC QL NAA+PROBE: NEGATIVE
SPECIMEN SOURCE: NORMAL

## 2020-11-27 ENCOUNTER — ROUTINE PRENATAL (OUTPATIENT)
Dept: OBSTETRICS AND GYNECOLOGY | Facility: CLINIC | Age: 30
End: 2020-11-27
Payer: MEDICAID

## 2020-11-27 VITALS — DIASTOLIC BLOOD PRESSURE: 68 MMHG | SYSTOLIC BLOOD PRESSURE: 114 MMHG | WEIGHT: 238.31 LBS

## 2020-11-27 DIAGNOSIS — Z34.93 PRESENCE OF FETAL HEART SOUNDS IN THIRD TRIMESTER: ICD-10-CM

## 2020-11-27 DIAGNOSIS — Z3A.37 37 WEEKS GESTATION OF PREGNANCY: Primary | ICD-10-CM

## 2020-11-27 DIAGNOSIS — R82.998 URINE LEUKOCYTES: ICD-10-CM

## 2020-11-27 PROCEDURE — 99999 PR PBB SHADOW E&M-EST. PATIENT-LVL II: ICD-10-PCS | Mod: PBBFAC,,,

## 2020-11-27 PROCEDURE — 87088 URINE BACTERIA CULTURE: CPT

## 2020-11-27 PROCEDURE — 99213 OFFICE O/P EST LOW 20 MIN: CPT | Mod: TH,S$PBB,, | Performed by: OBSTETRICS & GYNECOLOGY

## 2020-11-27 PROCEDURE — 99213 PR OFFICE/OUTPT VISIT, EST, LEVL III, 20-29 MIN: ICD-10-PCS | Mod: TH,S$PBB,, | Performed by: OBSTETRICS & GYNECOLOGY

## 2020-11-27 PROCEDURE — 87086 URINE CULTURE/COLONY COUNT: CPT

## 2020-11-27 PROCEDURE — 99212 OFFICE O/P EST SF 10 MIN: CPT | Mod: PBBFAC

## 2020-11-27 PROCEDURE — 99999 PR PBB SHADOW E&M-EST. PATIENT-LVL II: CPT | Mod: PBBFAC,,,

## 2020-11-27 NOTE — PROGRESS NOTES
Here for routine OB appt at 37w0d. Noticed wet underwear with discharge. Denies ROM.  Reports good FM.  Denies LOF, denies VB, denies contractions.UA + leukocytes, urine culture sent. T3 labs ordered. Valtrex BID.   Reviewed warning signs of Labor and Preeclampsia.  Daily FM counts reinforced.  F/U scheduled 1 weeks

## 2020-11-29 LAB — BACTERIA UR CULT: ABNORMAL

## 2020-12-01 ENCOUNTER — PATIENT MESSAGE (OUTPATIENT)
Dept: OBSTETRICS AND GYNECOLOGY | Facility: CLINIC | Age: 30
End: 2020-12-01

## 2020-12-04 ENCOUNTER — ROUTINE PRENATAL (OUTPATIENT)
Dept: OBSTETRICS AND GYNECOLOGY | Facility: CLINIC | Age: 30
End: 2020-12-04
Payer: MEDICAID

## 2020-12-04 VITALS — SYSTOLIC BLOOD PRESSURE: 132 MMHG | DIASTOLIC BLOOD PRESSURE: 80 MMHG | WEIGHT: 238.75 LBS

## 2020-12-04 DIAGNOSIS — B00.9 HERPES VIRUS INFECTION IN MOTHER DURING THIRD TRIMESTER OF PREGNANCY: ICD-10-CM

## 2020-12-04 DIAGNOSIS — O99.210 OBESITY IN PREGNANCY: ICD-10-CM

## 2020-12-04 DIAGNOSIS — O98.513 HERPES VIRUS INFECTION IN MOTHER DURING THIRD TRIMESTER OF PREGNANCY: ICD-10-CM

## 2020-12-04 DIAGNOSIS — Z34.03 ENCOUNTER FOR SUPERVISION OF NORMAL FIRST PREGNANCY IN THIRD TRIMESTER: Primary | ICD-10-CM

## 2020-12-04 PROCEDURE — 99213 OFFICE O/P EST LOW 20 MIN: CPT | Mod: TH,S$PBB,, | Performed by: OBSTETRICS & GYNECOLOGY

## 2020-12-04 PROCEDURE — 99213 PR OFFICE/OUTPT VISIT, EST, LEVL III, 20-29 MIN: ICD-10-PCS | Mod: TH,S$PBB,, | Performed by: OBSTETRICS & GYNECOLOGY

## 2020-12-04 PROCEDURE — 99999 PR PBB SHADOW E&M-EST. PATIENT-LVL II: ICD-10-PCS | Mod: PBBFAC,,, | Performed by: OBSTETRICS & GYNECOLOGY

## 2020-12-04 PROCEDURE — 99212 OFFICE O/P EST SF 10 MIN: CPT | Mod: PBBFAC,TH | Performed by: OBSTETRICS & GYNECOLOGY

## 2020-12-04 PROCEDURE — 99999 PR PBB SHADOW E&M-EST. PATIENT-LVL II: CPT | Mod: PBBFAC,,, | Performed by: OBSTETRICS & GYNECOLOGY

## 2020-12-04 NOTE — PROGRESS NOTES
Good FM, no LOF, Ctx, VB.   Taking valtrex. No outbreak symptoms. Wants IOL 12/20 in PM if not delivered by then - order placed. Precautions given.

## 2020-12-11 ENCOUNTER — ROUTINE PRENATAL (OUTPATIENT)
Dept: OBSTETRICS AND GYNECOLOGY | Facility: CLINIC | Age: 30
End: 2020-12-11
Payer: MEDICAID

## 2020-12-11 VITALS — SYSTOLIC BLOOD PRESSURE: 109 MMHG | DIASTOLIC BLOOD PRESSURE: 75 MMHG | WEIGHT: 237 LBS

## 2020-12-11 DIAGNOSIS — Z01.818 PRE-OP EXAM: Primary | ICD-10-CM

## 2020-12-11 DIAGNOSIS — B00.9 HERPES VIRUS INFECTION IN MOTHER DURING THIRD TRIMESTER OF PREGNANCY: ICD-10-CM

## 2020-12-11 DIAGNOSIS — Z34.03 ENCOUNTER FOR SUPERVISION OF NORMAL FIRST PREGNANCY IN THIRD TRIMESTER: ICD-10-CM

## 2020-12-11 DIAGNOSIS — O99.210 OBESITY IN PREGNANCY: ICD-10-CM

## 2020-12-11 DIAGNOSIS — O98.513 HERPES VIRUS INFECTION IN MOTHER DURING THIRD TRIMESTER OF PREGNANCY: ICD-10-CM

## 2020-12-11 PROCEDURE — 99213 OFFICE O/P EST LOW 20 MIN: CPT | Mod: TH,S$PBB,, | Performed by: OBSTETRICS & GYNECOLOGY

## 2020-12-11 PROCEDURE — 99212 OFFICE O/P EST SF 10 MIN: CPT | Mod: PBBFAC,TH | Performed by: OBSTETRICS & GYNECOLOGY

## 2020-12-11 PROCEDURE — 99999 PR PBB SHADOW E&M-EST. PATIENT-LVL II: CPT | Mod: PBBFAC,,, | Performed by: OBSTETRICS & GYNECOLOGY

## 2020-12-11 PROCEDURE — 99213 PR OFFICE/OUTPT VISIT, EST, LEVL III, 20-29 MIN: ICD-10-PCS | Mod: TH,S$PBB,, | Performed by: OBSTETRICS & GYNECOLOGY

## 2020-12-11 PROCEDURE — 99999 PR PBB SHADOW E&M-EST. PATIENT-LVL II: ICD-10-PCS | Mod: PBBFAC,,, | Performed by: OBSTETRICS & GYNECOLOGY

## 2020-12-11 NOTE — PROGRESS NOTES
Good FM, no LOF, Ctx, VB.   Denies outbreak. No lesions on vulva or perineum on my exam. IOL ordered 12/20. Covid scheduled and consents signed today. Precautions given.

## 2020-12-15 ENCOUNTER — ROUTINE PRENATAL (OUTPATIENT)
Dept: OBSTETRICS AND GYNECOLOGY | Facility: CLINIC | Age: 30
End: 2020-12-15
Payer: MEDICAID

## 2020-12-15 VITALS — DIASTOLIC BLOOD PRESSURE: 84 MMHG | SYSTOLIC BLOOD PRESSURE: 110 MMHG | WEIGHT: 239.63 LBS

## 2020-12-15 DIAGNOSIS — B00.9 HERPES VIRUS INFECTION IN MOTHER DURING THIRD TRIMESTER OF PREGNANCY: ICD-10-CM

## 2020-12-15 DIAGNOSIS — O98.513 HERPES VIRUS INFECTION IN MOTHER DURING THIRD TRIMESTER OF PREGNANCY: ICD-10-CM

## 2020-12-15 DIAGNOSIS — O99.210 OBESITY IN PREGNANCY: Primary | ICD-10-CM

## 2020-12-15 DIAGNOSIS — Z34.03 ENCOUNTER FOR SUPERVISION OF NORMAL FIRST PREGNANCY IN THIRD TRIMESTER: ICD-10-CM

## 2020-12-15 PROCEDURE — 99213 OFFICE O/P EST LOW 20 MIN: CPT | Mod: TH,S$PBB,, | Performed by: OBSTETRICS & GYNECOLOGY

## 2020-12-15 PROCEDURE — 99212 OFFICE O/P EST SF 10 MIN: CPT | Mod: PBBFAC,TH | Performed by: OBSTETRICS & GYNECOLOGY

## 2020-12-15 PROCEDURE — 99999 PR PBB SHADOW E&M-EST. PATIENT-LVL II: CPT | Mod: PBBFAC,,, | Performed by: OBSTETRICS & GYNECOLOGY

## 2020-12-15 PROCEDURE — 99213 PR OFFICE/OUTPT VISIT, EST, LEVL III, 20-29 MIN: ICD-10-PCS | Mod: TH,S$PBB,, | Performed by: OBSTETRICS & GYNECOLOGY

## 2020-12-15 PROCEDURE — 99999 PR PBB SHADOW E&M-EST. PATIENT-LVL II: ICD-10-PCS | Mod: PBBFAC,,, | Performed by: OBSTETRICS & GYNECOLOGY

## 2020-12-15 NOTE — PROGRESS NOTES
Good FM, no LOF, Ctx, VB.   Membrane sweeping performed today. IOL scheduled for Jose night. No HSV lesions on SSE. Taking valtrex. Precautions given.

## 2020-12-17 ENCOUNTER — HOSPITAL ENCOUNTER (OUTPATIENT)
Dept: PREADMISSION TESTING | Facility: OTHER | Age: 30
Discharge: HOME | End: 2020-12-17
Attending: OBSTETRICS & GYNECOLOGY
Payer: MEDICAID

## 2020-12-17 DIAGNOSIS — Z01.818 PRE-OP EXAM: ICD-10-CM

## 2020-12-17 LAB — SARS-COV-2 RDRP RESP QL NAA+PROBE: NEGATIVE

## 2020-12-17 PROCEDURE — U0002 COVID-19 LAB TEST NON-CDC: HCPCS

## 2020-12-20 ENCOUNTER — ANESTHESIA (OUTPATIENT)
Dept: OBSTETRICS AND GYNECOLOGY | Facility: OTHER | Age: 30
End: 2020-12-20
Payer: MEDICAID

## 2020-12-20 ENCOUNTER — ANESTHESIA EVENT (OUTPATIENT)
Dept: OBSTETRICS AND GYNECOLOGY | Facility: OTHER | Age: 30
End: 2020-12-20
Payer: MEDICAID

## 2020-12-20 ENCOUNTER — HOSPITAL ENCOUNTER (INPATIENT)
Facility: OTHER | Age: 30
LOS: 5 days | Discharge: HOME | End: 2020-12-25
Attending: OBSTETRICS & GYNECOLOGY | Admitting: OBSTETRICS & GYNECOLOGY
Payer: MEDICAID

## 2020-12-20 DIAGNOSIS — Z34.03 ENCOUNTER FOR SUPERVISION OF NORMAL FIRST PREGNANCY IN THIRD TRIMESTER: ICD-10-CM

## 2020-12-20 DIAGNOSIS — Z34.90 ENCOUNTER FOR ELECTIVE INDUCTION OF LABOR: ICD-10-CM

## 2020-12-20 LAB
ABO + RH BLD: NORMAL
BASOPHILS # BLD AUTO: 0.02 K/UL (ref 0–0.2)
BASOPHILS NFR BLD: 0.3 % (ref 0–1.9)
BLD GP AB SCN CELLS X3 SERPL QL: NORMAL
DIFFERENTIAL METHOD: ABNORMAL
EOSINOPHIL # BLD AUTO: 0.1 K/UL (ref 0–0.5)
EOSINOPHIL NFR BLD: 0.9 % (ref 0–8)
ERYTHROCYTE [DISTWIDTH] IN BLOOD BY AUTOMATED COUNT: 13.7 % (ref 11.5–14.5)
HCT VFR BLD AUTO: 34.8 % (ref 37–48.5)
HGB BLD-MCNC: 11.4 G/DL (ref 12–16)
IMM GRANULOCYTES # BLD AUTO: 0.04 K/UL (ref 0–0.04)
IMM GRANULOCYTES NFR BLD AUTO: 0.5 % (ref 0–0.5)
LYMPHOCYTES # BLD AUTO: 1.7 K/UL (ref 1–4.8)
LYMPHOCYTES NFR BLD: 22 % (ref 18–48)
MCH RBC QN AUTO: 29.5 PG (ref 27–31)
MCHC RBC AUTO-ENTMCNC: 32.8 G/DL (ref 32–36)
MCV RBC AUTO: 90 FL (ref 82–98)
MONOCYTES # BLD AUTO: 0.5 K/UL (ref 0.3–1)
MONOCYTES NFR BLD: 6.5 % (ref 4–15)
NEUTROPHILS # BLD AUTO: 5.5 K/UL (ref 1.8–7.7)
NEUTROPHILS NFR BLD: 69.8 % (ref 38–73)
NRBC BLD-RTO: 0 /100 WBC
PLATELET # BLD AUTO: 220 K/UL (ref 150–350)
PMV BLD AUTO: 10.5 FL (ref 9.2–12.9)
RBC # BLD AUTO: 3.86 M/UL (ref 4–5.4)
WBC # BLD AUTO: 7.81 K/UL (ref 3.9–12.7)

## 2020-12-20 PROCEDURE — 11000001 HC ACUTE MED/SURG PRIVATE ROOM

## 2020-12-20 PROCEDURE — 86901 BLOOD TYPING SEROLOGIC RH(D): CPT

## 2020-12-20 PROCEDURE — 85025 COMPLETE CBC W/AUTO DIFF WBC: CPT

## 2020-12-20 RX ORDER — OXYTOCIN/RINGER'S LACTATE 30/500 ML
334 PLASTIC BAG, INJECTION (ML) INTRAVENOUS ONCE
Status: DISCONTINUED | OUTPATIENT
Start: 2020-12-20 | End: 2020-12-21

## 2020-12-20 RX ORDER — SIMETHICONE 80 MG
1 TABLET,CHEWABLE ORAL 4 TIMES DAILY PRN
Status: DISCONTINUED | OUTPATIENT
Start: 2020-12-20 | End: 2020-12-21

## 2020-12-20 RX ORDER — SODIUM CHLORIDE, SODIUM LACTATE, POTASSIUM CHLORIDE, CALCIUM CHLORIDE 600; 310; 30; 20 MG/100ML; MG/100ML; MG/100ML; MG/100ML
INJECTION, SOLUTION INTRAVENOUS CONTINUOUS
Status: DISCONTINUED | OUTPATIENT
Start: 2020-12-20 | End: 2020-12-21

## 2020-12-20 RX ORDER — BUPROPION HYDROCHLORIDE 150 MG/1
300 TABLET ORAL DAILY
Status: DISCONTINUED | OUTPATIENT
Start: 2020-12-21 | End: 2020-12-25 | Stop reason: HOSPADM

## 2020-12-20 RX ORDER — OXYTOCIN/RINGER'S LACTATE 30/500 ML
95 PLASTIC BAG, INJECTION (ML) INTRAVENOUS ONCE
Status: DISCONTINUED | OUTPATIENT
Start: 2020-12-20 | End: 2020-12-21

## 2020-12-20 RX ORDER — SODIUM CHLORIDE 9 MG/ML
INJECTION, SOLUTION INTRAVENOUS
Status: DISCONTINUED | OUTPATIENT
Start: 2020-12-20 | End: 2020-12-21

## 2020-12-20 RX ORDER — CALCIUM CARBONATE 200(500)MG
500 TABLET,CHEWABLE ORAL 3 TIMES DAILY PRN
Status: DISCONTINUED | OUTPATIENT
Start: 2020-12-20 | End: 2020-12-21

## 2020-12-20 RX ORDER — ONDANSETRON 8 MG/1
8 TABLET, ORALLY DISINTEGRATING ORAL EVERY 8 HOURS PRN
Status: DISCONTINUED | OUTPATIENT
Start: 2020-12-20 | End: 2020-12-21

## 2020-12-21 PROBLEM — Z86.59 H/O: DEPRESSION: Status: ACTIVE | Noted: 2020-12-21

## 2020-12-21 PROBLEM — A60.00 GENITAL HSV: Status: ACTIVE | Noted: 2020-12-21

## 2020-12-21 PROBLEM — O99.213 OBESITY AFFECTING PREGNANCY IN THIRD TRIMESTER: Status: ACTIVE | Noted: 2020-12-21

## 2020-12-21 PROBLEM — Z72.0 TOBACCO USE: Status: ACTIVE | Noted: 2020-12-21

## 2020-12-21 LAB — HIV 1+2 AB+HIV1 P24 AG SERPL QL IA: NEGATIVE

## 2020-12-21 PROCEDURE — 59200 INSERT CERVICAL DILATOR: CPT

## 2020-12-21 PROCEDURE — 25000003 PHARM REV CODE 250: Performed by: STUDENT IN AN ORGANIZED HEALTH CARE EDUCATION/TRAINING PROGRAM

## 2020-12-21 PROCEDURE — 59514 PR CESAREAN DELIVERY ONLY: ICD-10-PCS | Mod: GB,,, | Performed by: OBSTETRICS & GYNECOLOGY

## 2020-12-21 PROCEDURE — 59514 CESAREAN DELIVERY ONLY: CPT | Mod: GB,,, | Performed by: OBSTETRICS & GYNECOLOGY

## 2020-12-21 PROCEDURE — C1726 CATH, BAL DIL, NON-VASCULAR: HCPCS

## 2020-12-21 PROCEDURE — 86592 SYPHILIS TEST NON-TREP QUAL: CPT

## 2020-12-21 PROCEDURE — 11000001 HC ACUTE MED/SURG PRIVATE ROOM

## 2020-12-21 PROCEDURE — 37000008 HC ANESTHESIA 1ST 15 MINUTES: Performed by: OBSTETRICS & GYNECOLOGY

## 2020-12-21 PROCEDURE — 59514 PRA REAN DELIVERY ONLY: ICD-10-PCS | Mod: AA,,, | Performed by: ANESTHESIOLOGY

## 2020-12-21 PROCEDURE — 62326 NJX INTERLAMINAR LMBR/SAC: CPT | Performed by: STUDENT IN AN ORGANIZED HEALTH CARE EDUCATION/TRAINING PROGRAM

## 2020-12-21 PROCEDURE — 27200710 HC EPIDURAL INFUSION PUMP SET: Performed by: ANESTHESIOLOGY

## 2020-12-21 PROCEDURE — 71000033 HC RECOVERY, INTIAL HOUR: Performed by: OBSTETRICS & GYNECOLOGY

## 2020-12-21 PROCEDURE — 01968 ANES/ANALG CS DLVR NEURAXIAL: CPT | Mod: AA,,, | Performed by: ANESTHESIOLOGY

## 2020-12-21 PROCEDURE — 37000009 HC ANESTHESIA EA ADD 15 MINS: Performed by: OBSTETRICS & GYNECOLOGY

## 2020-12-21 PROCEDURE — 25000003 PHARM REV CODE 250

## 2020-12-21 PROCEDURE — 72100002 HC LABOR CARE, 1ST 8 HOURS

## 2020-12-21 PROCEDURE — 63600175 PHARM REV CODE 636 W HCPCS: Performed by: STUDENT IN AN ORGANIZED HEALTH CARE EDUCATION/TRAINING PROGRAM

## 2020-12-21 PROCEDURE — 25000003 PHARM REV CODE 250: Performed by: ANESTHESIOLOGY

## 2020-12-21 PROCEDURE — 71000039 HC RECOVERY, EACH ADD'L HOUR: Performed by: OBSTETRICS & GYNECOLOGY

## 2020-12-21 PROCEDURE — 59514 CESAREAN DELIVERY ONLY: CPT | Mod: AA,,, | Performed by: ANESTHESIOLOGY

## 2020-12-21 PROCEDURE — 36004725 HC OB OR TIME LEV III - EA ADD 15 MIN: Performed by: OBSTETRICS & GYNECOLOGY

## 2020-12-21 PROCEDURE — 51702 INSERT TEMP BLADDER CATH: CPT

## 2020-12-21 PROCEDURE — 72100003 HC LABOR CARE, EA. ADDL. 8 HRS

## 2020-12-21 PROCEDURE — 86703 HIV-1/HIV-2 1 RESULT ANTBDY: CPT

## 2020-12-21 PROCEDURE — 01968 PR INSERT CATH,ART,PERCUT,SHORTTERM: ICD-10-PCS | Mod: AA,,, | Performed by: ANESTHESIOLOGY

## 2020-12-21 PROCEDURE — C1751 CATH, INF, PER/CENT/MIDLINE: HCPCS | Performed by: ANESTHESIOLOGY

## 2020-12-21 PROCEDURE — 27000181 HC CABLE, IUPC

## 2020-12-21 PROCEDURE — 36004724 HC OB OR TIME LEV III - 1ST 15 MIN: Performed by: OBSTETRICS & GYNECOLOGY

## 2020-12-21 PROCEDURE — 63600175 PHARM REV CODE 636 W HCPCS

## 2020-12-21 RX ORDER — OXYCODONE AND ACETAMINOPHEN 10; 325 MG/1; MG/1
1 TABLET ORAL EVERY 4 HOURS PRN
Status: DISCONTINUED | OUTPATIENT
Start: 2020-12-22 | End: 2020-12-22

## 2020-12-21 RX ORDER — OXYCODONE AND ACETAMINOPHEN 5; 325 MG/1; MG/1
1 TABLET ORAL EVERY 4 HOURS PRN
Status: DISCONTINUED | OUTPATIENT
Start: 2020-12-22 | End: 2020-12-22

## 2020-12-21 RX ORDER — DOCUSATE SODIUM 100 MG/1
200 CAPSULE, LIQUID FILLED ORAL 2 TIMES DAILY
Status: DISCONTINUED | OUTPATIENT
Start: 2020-12-22 | End: 2020-12-25 | Stop reason: HOSPADM

## 2020-12-21 RX ORDER — MISOPROSTOL 200 UG/1
TABLET ORAL
Status: DISCONTINUED
Start: 2020-12-21 | End: 2020-12-22 | Stop reason: WASHOUT

## 2020-12-21 RX ORDER — METHYLERGONOVINE MALEATE 0.2 MG/ML
INJECTION INTRAVENOUS
Status: DISCONTINUED | OUTPATIENT
Start: 2020-12-21 | End: 2020-12-22

## 2020-12-21 RX ORDER — BISACODYL 10 MG
10 SUPPOSITORY, RECTAL RECTAL ONCE AS NEEDED
Status: DISCONTINUED | OUTPATIENT
Start: 2020-12-22 | End: 2020-12-25 | Stop reason: HOSPADM

## 2020-12-21 RX ORDER — SIMETHICONE 80 MG
1 TABLET,CHEWABLE ORAL EVERY 6 HOURS PRN
Status: DISCONTINUED | OUTPATIENT
Start: 2020-12-22 | End: 2020-12-25 | Stop reason: HOSPADM

## 2020-12-21 RX ORDER — LIDOCAINE HYDROCHLORIDE AND EPINEPHRINE 20; 10 MG/ML; UG/ML
INJECTION, SOLUTION INFILTRATION; PERINEURAL
Status: DISCONTINUED | OUTPATIENT
Start: 2020-12-21 | End: 2020-12-22

## 2020-12-21 RX ORDER — BUPIVACAINE HYDROCHLORIDE 2.5 MG/ML
INJECTION, SOLUTION EPIDURAL; INFILTRATION; INTRACAUDAL
Status: DISPENSED
Start: 2020-12-21 | End: 2020-12-22

## 2020-12-21 RX ORDER — VALACYCLOVIR HYDROCHLORIDE 500 MG/1
500 TABLET, FILM COATED ORAL 2 TIMES DAILY
Status: DISCONTINUED | OUTPATIENT
Start: 2020-12-21 | End: 2020-12-21

## 2020-12-21 RX ORDER — MORPHINE SULFATE 0.5 MG/ML
INJECTION, SOLUTION EPIDURAL; INTRATHECAL; INTRAVENOUS
Status: DISCONTINUED | OUTPATIENT
Start: 2020-12-21 | End: 2020-12-22

## 2020-12-21 RX ORDER — VALACYCLOVIR HYDROCHLORIDE 500 MG/1
500 TABLET, FILM COATED ORAL DAILY
Status: DISCONTINUED | OUTPATIENT
Start: 2020-12-21 | End: 2020-12-21

## 2020-12-21 RX ORDER — ACETAMINOPHEN 500 MG
1000 TABLET ORAL ONCE
Status: COMPLETED | OUTPATIENT
Start: 2020-12-21 | End: 2020-12-21

## 2020-12-21 RX ORDER — FAMOTIDINE 10 MG/ML
20 INJECTION INTRAVENOUS ONCE
Status: DISCONTINUED | OUTPATIENT
Start: 2020-12-21 | End: 2020-12-25 | Stop reason: HOSPADM

## 2020-12-21 RX ORDER — CARBOPROST TROMETHAMINE 250 UG/ML
INJECTION, SOLUTION INTRAMUSCULAR
Status: DISCONTINUED
Start: 2020-12-21 | End: 2020-12-22 | Stop reason: WASHOUT

## 2020-12-21 RX ORDER — FENTANYL CITRATE 50 UG/ML
INJECTION, SOLUTION INTRAMUSCULAR; INTRAVENOUS
Status: COMPLETED
Start: 2020-12-21 | End: 2020-12-21

## 2020-12-21 RX ORDER — OXYTOCIN 10 [USP'U]/ML
INJECTION, SOLUTION INTRAMUSCULAR; INTRAVENOUS
Status: DISCONTINUED | OUTPATIENT
Start: 2020-12-21 | End: 2020-12-22

## 2020-12-21 RX ORDER — SODIUM CHLORIDE, SODIUM LACTATE, POTASSIUM CHLORIDE, CALCIUM CHLORIDE 600; 310; 30; 20 MG/100ML; MG/100ML; MG/100ML; MG/100ML
INJECTION, SOLUTION INTRAVENOUS CONTINUOUS PRN
Status: DISCONTINUED | OUTPATIENT
Start: 2020-12-21 | End: 2020-12-22

## 2020-12-21 RX ORDER — SODIUM CHLORIDE, SODIUM LACTATE, POTASSIUM CHLORIDE, CALCIUM CHLORIDE 600; 310; 30; 20 MG/100ML; MG/100ML; MG/100ML; MG/100ML
INJECTION, SOLUTION INTRAVENOUS CONTINUOUS
Status: ACTIVE | OUTPATIENT
Start: 2020-12-22 | End: 2020-12-22

## 2020-12-21 RX ORDER — ONDANSETRON 2 MG/ML
INJECTION INTRAMUSCULAR; INTRAVENOUS
Status: DISCONTINUED | OUTPATIENT
Start: 2020-12-21 | End: 2020-12-22

## 2020-12-21 RX ORDER — SODIUM CITRATE AND CITRIC ACID MONOHYDRATE 334; 500 MG/5ML; MG/5ML
SOLUTION ORAL
Status: COMPLETED
Start: 2020-12-21 | End: 2020-12-21

## 2020-12-21 RX ORDER — PRENATAL WITH FERROUS FUM AND FOLIC ACID 3080; 920; 120; 400; 22; 1.84; 3; 20; 10; 1; 12; 200; 27; 25; 2 [IU]/1; [IU]/1; MG/1; [IU]/1; MG/1; MG/1; MG/1; MG/1; MG/1; MG/1; UG/1; MG/1; MG/1; MG/1; MG/1
1 TABLET ORAL DAILY
Status: DISCONTINUED | OUTPATIENT
Start: 2020-12-22 | End: 2020-12-25 | Stop reason: HOSPADM

## 2020-12-21 RX ORDER — FENTANYL/BUPIVACAINE/NS/PF 2MCG/ML-.1
PLASTIC BAG, INJECTION (ML) INJECTION CONTINUOUS PRN
Status: DISCONTINUED | OUTPATIENT
Start: 2020-12-21 | End: 2020-12-22

## 2020-12-21 RX ORDER — SODIUM CITRATE AND CITRIC ACID MONOHYDRATE 334; 500 MG/5ML; MG/5ML
30 SOLUTION ORAL ONCE
Status: DISCONTINUED | OUTPATIENT
Start: 2020-12-21 | End: 2020-12-25 | Stop reason: HOSPADM

## 2020-12-21 RX ORDER — PHENYLEPHRINE HYDROCHLORIDE 10 MG/ML
INJECTION INTRAVENOUS
Status: DISCONTINUED | OUTPATIENT
Start: 2020-12-21 | End: 2020-12-22

## 2020-12-21 RX ORDER — HYDROCORTISONE 25 MG/G
CREAM TOPICAL 3 TIMES DAILY PRN
Status: DISCONTINUED | OUTPATIENT
Start: 2020-12-22 | End: 2020-12-25 | Stop reason: HOSPADM

## 2020-12-21 RX ORDER — AMOXICILLIN 250 MG
1 CAPSULE ORAL NIGHTLY PRN
Status: DISCONTINUED | OUTPATIENT
Start: 2020-12-22 | End: 2020-12-25 | Stop reason: HOSPADM

## 2020-12-21 RX ORDER — FAMOTIDINE 10 MG/ML
INJECTION INTRAVENOUS
Status: COMPLETED
Start: 2020-12-21 | End: 2020-12-21

## 2020-12-21 RX ORDER — ONDANSETRON 8 MG/1
8 TABLET, ORALLY DISINTEGRATING ORAL EVERY 8 HOURS PRN
Status: DISCONTINUED | OUTPATIENT
Start: 2020-12-22 | End: 2020-12-25 | Stop reason: HOSPADM

## 2020-12-21 RX ORDER — OXYTOCIN/RINGER'S LACTATE 30/500 ML
95 PLASTIC BAG, INJECTION (ML) INTRAVENOUS ONCE
Status: DISCONTINUED | OUTPATIENT
Start: 2020-12-22 | End: 2020-12-25 | Stop reason: HOSPADM

## 2020-12-21 RX ORDER — FENTANYL/BUPIVACAINE/NS/PF 2MCG/ML-.1
PLASTIC BAG, INJECTION (ML) INJECTION
Status: COMPLETED
Start: 2020-12-21 | End: 2020-12-21

## 2020-12-21 RX ORDER — DIPHENHYDRAMINE HCL 25 MG
25 CAPSULE ORAL EVERY 4 HOURS PRN
Status: DISCONTINUED | OUTPATIENT
Start: 2020-12-22 | End: 2020-12-25 | Stop reason: HOSPADM

## 2020-12-21 RX ORDER — LIDOCAINE HYDROCHLORIDE AND EPINEPHRINE 15; 5 MG/ML; UG/ML
INJECTION, SOLUTION EPIDURAL
Status: DISCONTINUED | OUTPATIENT
Start: 2020-12-21 | End: 2020-12-22

## 2020-12-21 RX ORDER — DEXAMETHASONE SODIUM PHOSPHATE 4 MG/ML
INJECTION, SOLUTION INTRA-ARTICULAR; INTRALESIONAL; INTRAMUSCULAR; INTRAVENOUS; SOFT TISSUE
Status: DISCONTINUED | OUTPATIENT
Start: 2020-12-21 | End: 2020-12-22

## 2020-12-21 RX ORDER — FENTANYL CITRATE 50 UG/ML
INJECTION, SOLUTION INTRAMUSCULAR; INTRAVENOUS
Status: DISCONTINUED | OUTPATIENT
Start: 2020-12-21 | End: 2020-12-22

## 2020-12-21 RX ORDER — IBUPROFEN 600 MG/1
600 TABLET ORAL EVERY 6 HOURS
Status: DISCONTINUED | OUTPATIENT
Start: 2020-12-22 | End: 2020-12-22

## 2020-12-21 RX ORDER — ADHESIVE BANDAGE
30 BANDAGE TOPICAL 2 TIMES DAILY PRN
Status: DISCONTINUED | OUTPATIENT
Start: 2020-12-22 | End: 2020-12-25 | Stop reason: HOSPADM

## 2020-12-21 RX ORDER — MUPIROCIN 20 MG/G
OINTMENT TOPICAL 2 TIMES DAILY
Status: DISCONTINUED | OUTPATIENT
Start: 2020-12-22 | End: 2020-12-25 | Stop reason: HOSPADM

## 2020-12-21 RX ORDER — OXYTOCIN/RINGER'S LACTATE 30/500 ML
0-30 PLASTIC BAG, INJECTION (ML) INTRAVENOUS CONTINUOUS
Status: DISCONTINUED | OUTPATIENT
Start: 2020-12-21 | End: 2020-12-25 | Stop reason: HOSPADM

## 2020-12-21 RX ADMIN — LIDOCAINE HYDROCHLORIDE,EPINEPHRINE BITARTRATE 5 ML: 20; .01 INJECTION, SOLUTION INFILTRATION; PERINEURAL at 10:12

## 2020-12-21 RX ADMIN — FENTANYL CITRATE 50 MCG: 50 INJECTION, SOLUTION INTRAMUSCULAR; INTRAVENOUS at 01:12

## 2020-12-21 RX ADMIN — SODIUM CHLORIDE, SODIUM LACTATE, POTASSIUM CHLORIDE, AND CALCIUM CHLORIDE: 600; 310; 30; 20 INJECTION, SOLUTION INTRAVENOUS at 11:12

## 2020-12-21 RX ADMIN — OXYTOCIN 3 UNITS: 10 INJECTION, SOLUTION INTRAMUSCULAR; INTRAVENOUS at 11:12

## 2020-12-21 RX ADMIN — AZITHROMYCIN 500 MG: 500 INJECTION, POWDER, LYOPHILIZED, FOR SOLUTION INTRAVENOUS at 10:12

## 2020-12-21 RX ADMIN — LIDOCAINE HYDROCHLORIDE,EPINEPHRINE BITARTRATE 3 ML: 20; .01 INJECTION, SOLUTION INFILTRATION; PERINEURAL at 10:12

## 2020-12-21 RX ADMIN — PHENYLEPHRINE HYDROCHLORIDE 50 MCG/MIN: 10 INJECTION INTRAVENOUS at 11:12

## 2020-12-21 RX ADMIN — DEXTROSE 2 G: 50 INJECTION, SOLUTION INTRAVENOUS at 10:12

## 2020-12-21 RX ADMIN — ONDANSETRON HYDROCHLORIDE 4 MG: 2 INJECTION INTRAMUSCULAR; INTRAVENOUS at 10:12

## 2020-12-21 RX ADMIN — LIDOCAINE HYDROCHLORIDE,EPINEPHRINE BITARTRATE 3 ML: 15; .005 INJECTION, SOLUTION EPIDURAL; INFILTRATION; INTRACAUDAL; PERINEURAL at 01:12

## 2020-12-21 RX ADMIN — BUPROPION HYDROCHLORIDE 300 MG: 150 TABLET, FILM COATED, EXTENDED RELEASE ORAL at 08:12

## 2020-12-21 RX ADMIN — METHYLERGONOVINE MALEATE 200 MCG: 0.2 INJECTION, SOLUTION INTRAMUSCULAR; INTRAVENOUS at 11:12

## 2020-12-21 RX ADMIN — Medication 2 MILLI-UNITS/MIN: at 09:12

## 2020-12-21 RX ADMIN — Medication 10 ML/HR: at 01:12

## 2020-12-21 RX ADMIN — Medication 1.5 MG: at 11:12

## 2020-12-21 RX ADMIN — SODIUM CHLORIDE, SODIUM LACTATE, POTASSIUM CHLORIDE, AND CALCIUM CHLORIDE: .6; .31; .03; .02 INJECTION, SOLUTION INTRAVENOUS at 08:12

## 2020-12-21 RX ADMIN — PHENYLEPHRINE HYDROCHLORIDE 100 MCG: 10 INJECTION INTRAVENOUS at 10:12

## 2020-12-21 RX ADMIN — VALACYCLOVIR HYDROCHLORIDE 500 MG: 500 TABLET, FILM COATED ORAL at 08:12

## 2020-12-21 RX ADMIN — Medication 5 ML: at 01:12

## 2020-12-21 RX ADMIN — ACETAMINOPHEN 1000 MG: 500 TABLET, FILM COATED ORAL at 05:12

## 2020-12-21 RX ADMIN — DEXAMETHASONE SODIUM PHOSPHATE 4 MG: 4 INJECTION, SOLUTION INTRAMUSCULAR; INTRAVENOUS at 10:12

## 2020-12-21 RX ADMIN — SODIUM CITRATE AND CITRIC ACID MONOHYDRATE 15 ML: 500; 334 SOLUTION ORAL at 10:12

## 2020-12-21 RX ADMIN — FENTANYL CITRATE 100 MCG: 50 INJECTION, SOLUTION INTRAMUSCULAR; INTRAVENOUS at 10:12

## 2020-12-21 RX ADMIN — CALCIUM CARBONATE (ANTACID) CHEW TAB 500 MG 500 MG: 500 CHEW TAB at 12:12

## 2020-12-21 RX ADMIN — FAMOTIDINE 20 MG: 10 INJECTION INTRAVENOUS at 10:12

## 2020-12-21 RX ADMIN — MISOPROSTOL 50 MCG: 100 TABLET ORAL at 05:12

## 2020-12-21 RX ADMIN — SODIUM CHLORIDE, SODIUM LACTATE, POTASSIUM CHLORIDE, AND CALCIUM CHLORIDE: .6; .31; .03; .02 INJECTION, SOLUTION INTRAVENOUS at 12:12

## 2020-12-21 RX ADMIN — MISOPROSTOL 50 MCG: 100 TABLET ORAL at 12:12

## 2020-12-21 RX ADMIN — SODIUM CHLORIDE, SODIUM LACTATE, POTASSIUM CHLORIDE, AND CALCIUM CHLORIDE: 600; 310; 30; 20 INJECTION, SOLUTION INTRAVENOUS at 09:12

## 2020-12-21 NOTE — PROGRESS NOTES
LABOR NOTE    S:  Complaints: No.  Epidural working:  not applicable  IOL    O: /74   Pulse 77   Temp 97.5 °F (36.4 °C)   Resp 18   SpO2 97%   Breastfeeding No       FHT: Baseline 120, mod btbv, + accels, - decels Cat 1 (reassuring)  CTX: q irregular minutes  SVE: /-3, castillo removed      ASSESSMENT:   30 y.o.  at 40w3d, IOL    FHT reassuring    Active Hospital Problems    Diagnosis  POA    *Encounter for elective induction of labor [Z34.90]  Not Applicable    Encounter for supervision of normal first pregnancy in third trimester [Z34.03]  Not Applicable      Resolved Hospital Problems   No resolved problems to display.     Labor course:  0030: /-3; cytotec given & castillo refused  0430: /-3, castillo placed & another cytotec given  0900: /-3, castillo balloon removed. Too high to rupture. Will start pitocin.    PLAN:    Continue Close Maternal/Fetal Monitoring  Pitocin Augmentation per protocol  Recheck 2 hours or PRN    Katherine Boecking MD   Ob/Gyn PGY 1

## 2020-12-21 NOTE — ANESTHESIA PREPROCEDURE EVALUATION
[facility]  Anesthesia Pre-Operative Evaluation         Patient Name: Soha Brown  YOB: 1990  MRN: [id number]    2020      Soha Brown is a 30 y.o. female  @ 40w2d with h/o obesity, depression, and HSV who presents for IOL for IUP. Denies neurologic disease, cardiopulmonary issues, bleeding/clotting disorder, or spine pathology.       OB History    Para Term  AB Living   1             SAB TAB Ectopic Multiple Live Births                  # Outcome Date GA Lbr Sarthak/2nd Weight Sex Delivery Anes PTL Lv   1 Current                Review of patient's allergies indicates:  No Known Allergies    Wt Readings from Last 1 Encounters:   12/15/20 1623 108.7 kg (239 lb 10.2 oz)       BP Readings from Last 3 Encounters:   20 128/76   12/15/20 110/84   20 109/75       Patient Active Problem List   Diagnosis    Pregnancy with one fetus, antepartum    Encounter for elective induction of labor    Encounter for supervision of normal first pregnancy in third trimester       No past surgical history on file.    Social History     Socioeconomic History    Marital status: Single     Spouse name: Not on file    Number of children: Not on file    Years of education: Not on file    Highest education level: Not on file   Occupational History    Not on file   Social Needs    Financial resource strain: Not on file    Food insecurity     Worry: Not on file     Inability: Not on file    Transportation needs     Medical: Not on file     Non-medical: Not on file   Tobacco Use    Smoking status: Light Tobacco Smoker     Packs/day: 0.25     Types: Cigarettes    Smokeless tobacco: Never Used   Substance and Sexual Activity    Alcohol use: Not Currently    Drug use: Not Currently     Types: Marijuana    Sexual activity: Yes   Lifestyle    Physical activity     Days per week: Not on file     Minutes per session: Not on file    Stress:  Not on file   Relationships    Social connections     Talks on phone: Not on file     Gets together: Not on file     Attends Christian service: Not on file     Active member of club or organization: Not on file     Attends meetings of clubs or organizations: Not on file     Relationship status: Not on file   Other Topics Concern    Not on file   Social History Narrative    Not on file         Chemistry    No results found for: NA, K, CL, CO2, BUN, CREATININE, GLU No results found for: CALCIUM, ALKPHOS, AST, ALT, BILITOT, ESTGFRAFRICA, EGFRNONAA         Lab Results   Component Value Date    WBC 6.78 09/04/2020    HGB 10.8 (L) 09/04/2020    HCT 33.4 (L) 09/04/2020    MCV 92 09/04/2020     09/04/2020       No results for input(s): PT, INR, PROTIME, APTT in the last 72 hours.          Anesthesia Evaluation    I have reviewed the Patient Summary Reports.    I have reviewed the Nursing Notes. I have reviewed the NPO Status.   I have reviewed the Medications.     Review of Systems  Hematology/Oncology:  Hematology Normal        Cardiovascular:  Cardiovascular Normal     Pulmonary:  Pulmonary Normal    Renal/:  Renal/ Normal     Hepatic/GI:  Hepatic/GI Normal    Neurological:  Neurology Normal    Endocrine:  Endocrine Normal        Physical Exam  General:  Well nourished    Airway/Jaw/Neck:  Airway Findings: Mouth Opening: Normal Tongue: Normal  General Airway Assessment: Adult  Mallampati: II  Jaw/Neck Findings:  Neck ROM: Normal ROM      Dental:  Dental Findings: In tact   Chest/Lungs:  Chest/Lungs Clear    Heart/Vascular:  Heart Findings: Normal       Mental Status:  Mental Status Findings:  Cooperative, Alert and Oriented         Anesthesia Plan  Type of Anesthesia, risks & benefits discussed:  Anesthesia Type:  CSE, epidural, general, spinal  Patient's Preference:   Intra-op Monitoring Plan:   Intra-op Monitoring Plan Comments:   Post Op Pain Control Plan:   Post Op Pain Control Plan Comments:    Induction:    Beta Blocker:  Patient is not currently on a Beta-Blocker (No further documentation required).       Informed Consent: Patient understands risks and agrees with Anesthesia plan.  Questions answered. Anesthesia consent signed with patient.  ASA Score: 2     Day of Surgery Review of History & Physical: I have interviewed and examined the patient. I have reviewed the patient's H&P dated:    H&P update referred to the surgeon.         Ready For Surgery From Anesthesia Perspective.

## 2020-12-21 NOTE — H&P
HISTORY AND PHYSICAL                                                OBSTETRICS          Subjective:       Soha Brown is a 30 y.o.  female with IUP at 40w2d weeks gestation who presents for IOL.    Patient denies contractions, denies vaginal bleeding, denies LOF.   Fetal Movement: normal.    This IUP is complicated by HSV, depression, MO.    Review of Systems   Constitutional: Negative for chills and fever.   Respiratory: Negative for shortness of breath.    Cardiovascular: Negative for chest pain.   Gastrointestinal: Negative for abdominal pain.   Endocrine: Negative for diabetes.   Genitourinary: Negative for dyspareunia, dysuria, vaginal bleeding, vaginal discharge and vaginal pain.   Integumentary:  Negative for acne.   Neurological: Negative for syncope.       PMHx:   Past Medical History:   Diagnosis Date    Abnormal Pap smear of cervix     Herpes simplex virus (HSV) infection        PSHx: No past surgical history on file.    All: Review of patient's allergies indicates:  No Known Allergies    Meds:   Medications Prior to Admission   Medication Sig Dispense Refill Last Dose    buPROPion (WELLBUTRIN XL) 300 MG 24 hr tablet        melatonin 1 mg Tab Take by mouth.       PNV no.95/ferrous fum/folic ac (PRENATAL ORAL) Take by mouth.       valACYclovir (VALTREX) 500 MG tablet           SH:   Social History     Socioeconomic History    Marital status: Single     Spouse name: Not on file    Number of children: Not on file    Years of education: Not on file    Highest education level: Not on file   Occupational History    Not on file   Social Needs    Financial resource strain: Not on file    Food insecurity     Worry: Not on file     Inability: Not on file    Transportation needs     Medical: Not on file     Non-medical: Not on file   Tobacco Use    Smoking status: Light Tobacco Smoker     Packs/day: 0.25     Types: Cigarettes    Smokeless tobacco: Never Used   Substance and Sexual  Activity    Alcohol use: Not Currently    Drug use: Not Currently     Types: Marijuana    Sexual activity: Yes   Lifestyle    Physical activity     Days per week: Not on file     Minutes per session: Not on file    Stress: Not on file   Relationships    Social connections     Talks on phone: Not on file     Gets together: Not on file     Attends Restoration service: Not on file     Active member of club or organization: Not on file     Attends meetings of clubs or organizations: Not on file     Relationship status: Not on file   Other Topics Concern    Not on file   Social History Narrative    Not on file       FH:   Family History   Problem Relation Age of Onset    Diabetes Paternal Grandmother     Miscarriages / Stillbirths Maternal Grandmother     Heart disease Maternal Grandfather     Diabetes Father        OBHx:   OB History    Para Term  AB Living   1 0 0 0 0 0   SAB TAB Ectopic Multiple Live Births   0 0 0 0 0      # Outcome Date GA Lbr Sarthak/2nd Weight Sex Delivery Anes PTL Lv   1 Current                Objective:       There were no vitals taken for this visit.    There were no vitals filed for this visit.    General:   alert, appears stated age and cooperative, no apparent distress   HENT:  normocephalic, atraumatic   Eyes:  extraocular movements and conjunctivae normal   Neck:  supple, range of motion normal, no thyromegaly   Lungs:   no respiratory distress   Heart:   regular rate   Abdomen:  soft, non-tender, non-distended but gravid, no rebound or guarding    Extremities negative edema, negative erythema   FHT: Baseline 130, moderate BTBV, +accels, -decels;  Cat 1 (reassuring)                 TOCO: Not present   Presentations: cephalic by ultrasound   Cervix:     Dilation: 1cm    Effacement: 30%    Station:  -3    Consistency: medium    Position: middle       Lab Review  Blood Type O POS  GBBS: negative  Rubella: Immune  RPR: NR, 1st trimester  HIV: negative, first  trimester  HepB: negative       Assessment:       40w2d weeks gestation here for IOL    Active Hospital Problems    Diagnosis  POA    *Encounter for elective induction of labor [Z34.90]  Not Applicable    Encounter for supervision of normal first pregnancy in third trimester [Z34.03]  Not Applicable      Resolved Hospital Problems   No resolved problems to display.          Plan:   IOL   Risks, benefits, alternatives and possible complications have been discussed in detail with the patient.   - Consents signed and to chart  - Admit to Labor and Delivery unit   - Epidural per Anesthesia  - Draw CBC, T&S, 3rd trimester labrs  - Notify Staff  - Cytotec placement. Declining balloon.   - Recheck in 4 hrs or PRN    HSV  - On Valtrex ppx  - Negative spec exam for lesions     MO  - Pre-pregnancy BMI < 40  - TEDs/SCDs    Depression  - Wellbutrin daily     Post-Partum Hemorrhage risk - low    Anita Viera M.D.  OBGYN PGY2

## 2020-12-21 NOTE — PROGRESS NOTES
LABOR NOTE    S:  Complaints: No.  Epidural working:  not applicable  IOL    O: /66   Pulse 84   Temp 96.6 °F (35.9 °C)   Resp 18   SpO2 96%   Breastfeeding No       FHT: Baseline 130, mod btbv, + accels, - decels Cat 1 (reassuring)  CTX: q irregular minutes  SVE: /-2, AROM meconium      ASSESSMENT:   30 y.o.  at 40w3d, IOL    FHT reassuring    Active Hospital Problems    Diagnosis  POA    *Encounter for elective induction of labor [Z34.90]  Not Applicable    Encounter for supervision of normal first pregnancy in third trimester [Z34.03]  Not Applicable      Resolved Hospital Problems   No resolved problems to display.     Labor course:  0030: /-3; cytotec given & castillo refused  0430: /-3, castillo placed & another cytotec given  0900: /-3, castillo balloon removed. Too high to rupture. Will start pitocin.  1300: /-2, AROM    PLAN:    Continue Close Maternal/Fetal Monitoring  Pitocin Augmentation per protocol  Recheck 2 hours or PRN    Celeste Sanabria MD  OBGYN PGY-2

## 2020-12-21 NOTE — PROGRESS NOTES
LABOR NOTE    S:  Complaints: No.  Epidural working:  not applicable  IOL    O: /71   Pulse 91   Temp 98.1 °F (36.7 °C)   Resp 17   SpO2 97%   Breastfeeding No       FHT: Baseline 120, mod btbv, + accels, - decels Cat 1 (reassuring)  CTX: q irregular minutes  SVE: /-3, castillo placed      ASSESSMENT:   30 y.o.  at 40w3d, IOL    FHT reassuring    Active Hospital Problems    Diagnosis  POA    *Encounter for elective induction of labor [Z34.90]  Not Applicable    Encounter for supervision of normal first pregnancy in third trimester [Z34.03]  Not Applicable      Resolved Hospital Problems   No resolved problems to display.     Labor course:  0030: /-3; cytotec given & castillo refused  0430: /-3, castillo placed & another cytotec given    PLAN:    Continue Close Maternal/Fetal Monitoring  Pitocin Augmentation per protocol  Recheck 2 hours or PRN    Anita Viera M.D.  [facility] PGY2

## 2020-12-21 NOTE — ANESTHESIA PROCEDURE NOTES
Epidural    Patient location during procedure: OB   Reason for block: primary anesthetic   Diagnosis: IUP   Start time: 12/21/2020 1:42 PM  Timeout: 12/21/2020 1:42 PM  End time: 12/21/2020 1:46 PM  Surgery related to: Vaginal Delivery    Staffing  Performing Provider: Francis Valencia MD  Authorizing Provider: Jodi Sher MD        Preanesthetic Checklist  Completed: patient identified, site marked, surgical consent, pre-op evaluation, timeout performed, IV checked, risks and benefits discussed, monitors and equipment checked, anesthesia consent given, hand hygiene performed and patient being monitored  Preparation  Patient position: sitting  Prep: ChloraPrep  Patient monitoring: Pulse Ox  Epidural  Skin Anesthetic: lidocaine 1%  Skin Wheal: 3 mL  Administration type: continuous  Approach: midline  Interspace: L3-4    Injection technique: YONIS saline  Needle and Epidural Catheter  Needle type: Tuohy   Needle gauge: 17  Needle length: 3.5 inches  Needle insertion depth: 7 cm  Catheter type: springwound  Catheter size: 19 G  Catheter at skin depth: 11 cm  Test dose: 3 mL of lidocaine 1.5% with Epi 1-to-200,000  Additional Documentation: incremental injection, negative aspiration for heme and CSF, no paresthesia on injection, no signs/symptoms of IV or SA injection, no significant pain on injection and no significant complaints from patient  Needle localization: anatomical landmarks  Medications:  Volume per aspiration: 5 mL  Time between aspirations: 5 minutes  Assessment  Ease of block: easy  Patient's tolerance of the procedure: comfortable throughout block and no complaintsNo inadvertent dural puncture with Tuohy.  Dural puncture performed with spinal needle.

## 2020-12-21 NOTE — PLAN OF CARE
Problem: Adult Inpatient Plan of Care  Goal: Plan of Care Review  Outcome: Ongoing, Progressing  Goal: Patient-Specific Goal (Individualization)  Outcome: Ongoing, Progressing  Goal: Absence of Hospital-Acquired Illness or Injury  Outcome: Ongoing, Progressing  Goal: Optimal Comfort and Wellbeing  Outcome: Ongoing, Progressing  Goal: Readiness for Transition of Care  Outcome: Ongoing, Progressing  Goal: Rounds/Family Conference  Outcome: Ongoing, Progressing     Problem:  Fall Injury Risk  Goal: Absence of Fall, Infant Drop and Related Injury  Outcome: Ongoing, Progressing     Problem: Infection  Goal: Infection Symptom Resolution  Outcome: Ongoing, Progressing

## 2020-12-22 LAB
BASOPHILS # BLD AUTO: 0.02 K/UL (ref 0–0.2)
BASOPHILS NFR BLD: 0.1 % (ref 0–1.9)
DIFFERENTIAL METHOD: ABNORMAL
EOSINOPHIL # BLD AUTO: 0 K/UL (ref 0–0.5)
EOSINOPHIL NFR BLD: 0.1 % (ref 0–8)
ERYTHROCYTE [DISTWIDTH] IN BLOOD BY AUTOMATED COUNT: 13.5 % (ref 11.5–14.5)
HCT VFR BLD AUTO: 32.8 % (ref 37–48.5)
HGB BLD-MCNC: 11.1 G/DL (ref 12–16)
IMM GRANULOCYTES # BLD AUTO: 0.05 K/UL (ref 0–0.04)
IMM GRANULOCYTES NFR BLD AUTO: 0.3 % (ref 0–0.5)
LYMPHOCYTES # BLD AUTO: 0.9 K/UL (ref 1–4.8)
LYMPHOCYTES NFR BLD: 6.2 % (ref 18–48)
MCH RBC QN AUTO: 29.8 PG (ref 27–31)
MCHC RBC AUTO-ENTMCNC: 33.8 G/DL (ref 32–36)
MCV RBC AUTO: 88 FL (ref 82–98)
MONOCYTES # BLD AUTO: 0.7 K/UL (ref 0.3–1)
MONOCYTES NFR BLD: 4.4 % (ref 4–15)
NEUTROPHILS # BLD AUTO: 13.1 K/UL (ref 1.8–7.7)
NEUTROPHILS NFR BLD: 88.9 % (ref 38–73)
NRBC BLD-RTO: 0 /100 WBC
PLATELET # BLD AUTO: 204 K/UL (ref 150–350)
PMV BLD AUTO: 10 FL (ref 9.2–12.9)
RBC # BLD AUTO: 3.72 M/UL (ref 4–5.4)
RPR SER QL: NORMAL
WBC # BLD AUTO: 14.76 K/UL (ref 3.9–12.7)

## 2020-12-22 PROCEDURE — 36415 COLL VENOUS BLD VENIPUNCTURE: CPT

## 2020-12-22 PROCEDURE — 25000003 PHARM REV CODE 250: Performed by: STUDENT IN AN ORGANIZED HEALTH CARE EDUCATION/TRAINING PROGRAM

## 2020-12-22 PROCEDURE — 99231 PR SUBSEQUENT HOSPITAL CARE,LEVL I: ICD-10-PCS | Mod: ,,, | Performed by: OBSTETRICS & GYNECOLOGY

## 2020-12-22 PROCEDURE — 63600175 PHARM REV CODE 636 W HCPCS: Performed by: STUDENT IN AN ORGANIZED HEALTH CARE EDUCATION/TRAINING PROGRAM

## 2020-12-22 PROCEDURE — 11000001 HC ACUTE MED/SURG PRIVATE ROOM

## 2020-12-22 PROCEDURE — 99231 SBSQ HOSP IP/OBS SF/LOW 25: CPT | Mod: ,,, | Performed by: OBSTETRICS & GYNECOLOGY

## 2020-12-22 PROCEDURE — 85025 COMPLETE CBC W/AUTO DIFF WBC: CPT

## 2020-12-22 RX ORDER — OXYCODONE HYDROCHLORIDE 5 MG/1
10 TABLET ORAL EVERY 4 HOURS PRN
Status: DISPENSED | OUTPATIENT
Start: 2020-12-22 | End: 2020-12-23

## 2020-12-22 RX ORDER — IBUPROFEN 600 MG/1
600 TABLET ORAL EVERY 6 HOURS
Status: DISCONTINUED | OUTPATIENT
Start: 2020-12-23 | End: 2020-12-25 | Stop reason: HOSPADM

## 2020-12-22 RX ORDER — ACETAMINOPHEN 325 MG/1
650 TABLET ORAL EVERY 6 HOURS
Status: COMPLETED | OUTPATIENT
Start: 2020-12-22 | End: 2020-12-22

## 2020-12-22 RX ORDER — OXYCODONE AND ACETAMINOPHEN 10; 325 MG/1; MG/1
1 TABLET ORAL EVERY 4 HOURS PRN
Status: DISCONTINUED | OUTPATIENT
Start: 2020-12-23 | End: 2020-12-25 | Stop reason: HOSPADM

## 2020-12-22 RX ORDER — OXYCODONE HYDROCHLORIDE 5 MG/1
5 TABLET ORAL EVERY 4 HOURS PRN
Status: DISPENSED | OUTPATIENT
Start: 2020-12-22 | End: 2020-12-23

## 2020-12-22 RX ORDER — KETOROLAC TROMETHAMINE 30 MG/ML
30 INJECTION, SOLUTION INTRAMUSCULAR; INTRAVENOUS EVERY 6 HOURS
Status: COMPLETED | OUTPATIENT
Start: 2020-12-22 | End: 2020-12-22

## 2020-12-22 RX ORDER — OXYCODONE AND ACETAMINOPHEN 5; 325 MG/1; MG/1
1 TABLET ORAL EVERY 4 HOURS PRN
Status: DISCONTINUED | OUTPATIENT
Start: 2020-12-23 | End: 2020-12-25 | Stop reason: HOSPADM

## 2020-12-22 RX ADMIN — MUPIROCIN: 20 OINTMENT TOPICAL at 08:12

## 2020-12-22 RX ADMIN — KETOROLAC TROMETHAMINE 30 MG: 30 INJECTION, SOLUTION INTRAMUSCULAR at 01:12

## 2020-12-22 RX ADMIN — OXYCODONE HYDROCHLORIDE 5 MG: 5 TABLET ORAL at 03:12

## 2020-12-22 RX ADMIN — DOCUSATE SODIUM 200 MG: 100 CAPSULE, LIQUID FILLED ORAL at 09:12

## 2020-12-22 RX ADMIN — MUPIROCIN: 20 OINTMENT TOPICAL at 11:12

## 2020-12-22 RX ADMIN — DOCUSATE SODIUM 200 MG: 100 CAPSULE, LIQUID FILLED ORAL at 08:12

## 2020-12-22 RX ADMIN — IBUPROFEN 600 MG: 600 TABLET, FILM COATED ORAL at 11:12

## 2020-12-22 RX ADMIN — Medication 95 MILLI-UNITS/MIN: at 12:12

## 2020-12-22 RX ADMIN — ACETAMINOPHEN 650 MG: 325 TABLET, FILM COATED ORAL at 05:12

## 2020-12-22 RX ADMIN — ACETAMINOPHEN 650 MG: 325 TABLET, FILM COATED ORAL at 12:12

## 2020-12-22 RX ADMIN — SIMETHICONE CHEW TAB 80 MG 80 MG: 80 TABLET ORAL at 07:12

## 2020-12-22 RX ADMIN — ACETAMINOPHEN 650 MG: 325 TABLET, FILM COATED ORAL at 11:12

## 2020-12-22 RX ADMIN — KETOROLAC TROMETHAMINE 30 MG: 30 INJECTION, SOLUTION INTRAMUSCULAR at 05:12

## 2020-12-22 RX ADMIN — SIMETHICONE CHEW TAB 80 MG 80 MG: 80 TABLET ORAL at 11:12

## 2020-12-22 RX ADMIN — PRENATAL VIT W/ FE FUMARATE-FA TAB 27-0.8 MG 1 TABLET: 27-0.8 TAB at 08:12

## 2020-12-22 RX ADMIN — KETOROLAC TROMETHAMINE 30 MG: 30 INJECTION, SOLUTION INTRAMUSCULAR at 07:12

## 2020-12-22 RX ADMIN — BUPROPION HYDROCHLORIDE 300 MG: 150 TABLET, FILM COATED, EXTENDED RELEASE ORAL at 08:12

## 2020-12-22 RX ADMIN — KETOROLAC TROMETHAMINE 30 MG: 30 INJECTION, SOLUTION INTRAMUSCULAR at 12:12

## 2020-12-22 RX ADMIN — OXYCODONE HYDROCHLORIDE 5 MG: 5 TABLET ORAL at 09:12

## 2020-12-22 NOTE — PROGRESS NOTES
"MD to bedside at  to discuss plan with patient and her . They are both very anxious about still being in labor and not yet being delivered. I explained to the patient and her  that she is still on the normal labor curve and FHT are very reassuring and there is no reason to think the baby is in stress at this time.  states he is worried because the baby had meconium and patient has now been ruptured for almost 12 hours and being induced for 24 hours. He is also worried about the patient's history of HSV. I explained to the patient and her  that inductions can take a prolonged time when in latent labor and that she has had no HSV lesions on multiple speculum exams while being on prophylaxis since 36 weeks and thus  is safe at this time. I explained the risks of csection to the mother and the risks to her baby. Patient and her  state that they are just "done" and are thinking they want a . I went back into the room one hour later to recheck the patient and her dilation was still the same. Pt and her  then requested an elective PCS at that time. FHT reassuring. Will proceed with Elective PCS due to maternal exhaustion.   "

## 2020-12-22 NOTE — LACTATION NOTE
12/22/20 1210   Maternal Assessment   Breast Shape Bilateral:;round   Breast Density Bilateral:;soft   Areola Bilateral:;elastic   Nipples Bilateral:;everted   Maternal Infant Feeding   Maternal Emotional State assist needed   Infant Positioning clutch/football   Signs of Milk Transfer audible swallow;infant jaw motion present   Latch Assistance yes   assisted pt with position and latch. Baby latched easily to breast. Some on/off. Good tugs and pulls observed. Basic breastfeeding education provided. Questions answered. Encouraged skin to skin.

## 2020-12-22 NOTE — TRANSFER OF CARE
Anesthesia Transfer of Care Note    Patient: Soha Brown    Procedure(s) Performed: * No procedures listed *    Patient location: Labor and Delivery    Anesthesia Type: epidural    Transport from OR: Transported from OR on room air with adequate spontaneous ventilation    Post pain: adequate analgesia    Post assessment: no apparent anesthetic complications    Post vital signs: stable    Level of consciousness: awake, alert and oriented    Nausea/Vomiting: no nausea/vomiting    Complications: none    Transfer of care protocol was followed      Last vitals:   Visit Vitals  /62 (BP Location: Right arm, Patient Position: Lying)   Pulse 82   Temp 36.5 °C (97.7 °F) (Oral)   Resp 18   SpO2 97%   Breastfeeding Unknown

## 2020-12-22 NOTE — PROGRESS NOTES
POSTPARTUM PROGRESS NOTE     Soha Brown is a 30 y.o. 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    Patient is doing well this morning. She denies nausea, vomiting, fever or chills. Patient reports mild abdominal pain that is adequately relieved by oral pain medications. Lochia is mild to moderate and stable. Patient is voiding without difficulty and ambulating with no difficulty. She has passed flatus.    Patient does plan to breast feed. Will discuss contraception with primary OB.     Objective:       Temp:  [96.6 °F (35.9 °C)-98.3 °F (36.8 °C)] 97.7 °F (36.5 °C)  Pulse:  [] 82  Resp:  [16-18] 18  SpO2:  [95 %-100 %] 97 %  BP: ()/(53-94) 122/62    General:   alert, appears stated age and cooperative   Lungs:   Non-labored respirations    Heart:   regular rate and rhythm   Abdomen:  Soft, nondistended    Uterus:  firm located at the umbilicus.    Incision: Bandage in place, clean, dry and intact   Extremities: no pedal edema noted     Lab Review  No results found for this or any previous visit (from the past 4 hour(s)).    I/O    Intake/Output Summary (Last 24 hours) at 2020 0648  Last data filed at 2020 0600  Gross per 24 hour   Intake 1150 ml   Output 2970 ml   Net -1820 ml        Assessment:     Patient Active Problem List   Diagnosis    Pregnancy with one fetus, antepartum    Encounter for elective induction of labor    Encounter for supervision of normal first pregnancy in third trimester    Delivery by elective  section    Tobacco use    H/O: depression    Obesity affecting pregnancy in third trimester    Genital HSV        Plan:   1. Postpartum care:  - Patient doing well. Continue routine management and advances.  - Continue PO pain meds. Pain well controlled.  - Heme: H/h 11 >>> AM CBC pending  - Encourage ambulation  -  Contraception to be discussed at 6 week pp visit  - Lactation consult PRN  - Rh +    2. Depression  - Continue home Wellbutrin daily      Dispo: As patient meets milestones, will plan to discharge POD#2-4.      Yasmany Britton M.D.  OB/GYN PGY-1

## 2020-12-22 NOTE — ANESTHESIA POSTPROCEDURE EVALUATION
Anesthesia Post Evaluation    Patient: Soha Brown    Procedure(s) Performed: Procedure(s) (LRB):   SECTION (N/A)    Final Anesthesia Type: epidural      Patient location during evaluation: floor  Patient participation: Yes- Able to Participate  Level of consciousness: awake and alert, awake and oriented  Post-procedure vital signs: reviewed and stable  Pain management: adequate  Airway patency: patent  ANAND mitigation strategies: Multimodal analgesia  PONV status at discharge: No PONV  Anesthetic complications: no      Cardiovascular status: stable, hemodynamically stable and blood pressure returned to baseline  Respiratory status: room air, unassisted and spontaneous ventilation  Hydration status: euvolemic  Follow-up not needed.          Vitals Value Taken Time   /58 20 0853   Temp 36.4 °C (97.5 °F) 20 0853   Pulse 86 20 0853   Resp 18 20 0853   SpO2 97 % 20 0853         Event Time   Out of Recovery 2020 02:06:00         Pain/[place] Score: Pain Rating Prior to Med Admin: 4 (2020 12:31 PM)  Pain Rating Post Med Admin: 2 (2020  1:10 PM)

## 2020-12-22 NOTE — L&D DELIVERY NOTE
[facility]   Section   Operative Note    SUMMARY      Section Procedure Note    Indications: Ms. Soha Brown is a 30 [id number] female with IUP 40w3d weeks' gestational age who was admitted for induction of labor. After pursuing induction process, patient expressed exhaustion and desired elective c/s.      Pre-operative Diagnosis:   1. IUP @ 40.3 wks  2. Depression  3. HSV  4. Desire for elective c/s   5. MO    Post-operative Diagnosis:   1-5. Same  6. S/P pLTCS    Procedure: pLTCS    Surgeon: Julie Jeansonne, MD    Assistants: Anita Viera MD - PGY2    Anesthesia: Epidural anesthesia    Findings:   - Normal appearing tubes and ovaries  - A female infant weighing 3770g with APGARs of 8,9  - Uterine atony > methergine given   - Normal appearing placenta > discarded  - Patient is a candidate for TOLAC      Estimated Blood Loss:  655 cc                           Complications:  None; patient tolerated the procedure well.           Disposition: Recovery           Condition: stable    Procedure Details   The patient was seen in the Holding Room. admitted for induction of labor. After pursuing induction process, patient expressed exhaustion and desired elective c/s.  The risks, benefits, complications, treatment options, and expected outcomes were discussed with the patient.  The patient concurred with the proposed plan, giving informed consent.    The patient was taken to Operating Room, identified as Soha Brown, birthdate and  procedure were verified.  A Time Out was held and the above information confirmed.    After anesthesia epidural was found to be adequate, the patient was prepped and draped in the usual sterile fashion in the dorsal supine position with a leftward tilt.    A transverse incision was made in the skin and carried  through the underlying subcutaneous tissue to the level of the  fascia. The fascia was scored at the midline with the inside knife.  The  fascial incision was then extended transversely using the curved pak scissors. The superior aspect of the fascia was grasped with [facility] clamps x 2 and  from the underlying rectus tissue superiorly and with the help of the curved pak scissors. Attention was then turned to the inferior aspect of the fascial incision which was grasped and  from the underlying rectus muscle in a similar fashion.  The peritoneum was identified, found to be free of adherent bowl and entered bluntly.   The vesico-uterine peritoneum was then identified and bladder blade was inserted. The vesico-uterine peritoneum was grasped, tented away from the underlying uterus and entered sharply with the Metzenbaum scissors.  The incision was extended  transversely and the bladder flap was bluntly freed from the lower uterine segment. The bladder blade was reinserted to keep the bladder out of the operative field.   A transverse uterine incision was made with knife. The amniotic sac was then entered bluntly and fluid noted to be meconium.   Infant was noted to be in LOT position.  The patient delivered a single viable female infant weighing 3770 grams with Apgar scores of 8/9 at one and five minutes respectively was delivered and handed off to the waiting NICU staff.  The umbilical cord was clamped and cut. Cord blood was obtained for evaluation.   The placenta was removed intact, appeared normal, and was discarded. The uterus was exteriorized. The uterus, tubes and ovaries were examined and appeared normal. Uterine atony noted - methergine IM given. The uterine incision was closed with running sutures of 1-0 Chromic. An imbricating layer was then performed with 1-0 Chromic. Hemostasis was observed. The uterus was returned to the abdominal cavity. Incision was reinspected and good hemostasis was noted.   The abdominal cavity was then irrigated with moistened laps. The fascia was then reapproximated with running sutures of 1-0 PDS.  The subcutaneous tissue reapproximated with 2-0 plain gut. The skin was reapproximated with 4-0 Monocryl.    Sponge, lap and needle counts were correct x 2 prior the abdominal closure and at the conclusion of the case.         NOTE: THIS PATIENT IS  A CANDIDATE FOR A TRIAL OF LABOR AFTER     Anita Viera M.D.  OBGYN PGY2           Delivery Information for Joel Brown    Birth information:  YOB: 2020   Time of birth: 11:13 PM   Sex: female   Head Delivery Date/Time: 2020 11:13 PM   Delivery type: , Low Transverse   Gestational Age: 40w3d    Delivery Providers    Delivering clinician: Julie R. Jeansonne, MD   Provider Role    Anita Viera MD Resident    Sharon Red, RN Delivery Nurse    Tirpp Avendano RN Charge Nurse    Iris Godoy RN Registered Nurse    Tammy Tracey, [facility]            Measurements    Weight: 3770 g  Length: 51.4 cm  Head circumference: 36.8 cm  Chest circumference: 34.9 cm         Apgars    Living status: Living  Apgars:  1 min.:  5 min.:  10 min.:  15 min.:  20 min.:    Skin color:  0  1       Heart rate:  2  2       Reflex irritability:  2  2       Muscle tone:  2  2       Respiratory effort:  2  2       Total:  8  9       Apgars assigned by: NICU         Operative Delivery    Forceps attempted?: No  Vacuum extractor attempted?: No         Shoulder Dystocia    Shoulder dystocia present?: No           Presentation    Presentation: Vertex           Interventions/Resuscitation    Method: Bulb Suctioning, NICU Attended       Cord    Vessels: 3 vessels  Complications: None  Delayed Cord Clamping?: No  Cord Clamped Date/Time: 2020 11:13 PM  Cord Blood Disposition: Sent with Baby  Gases Sent?: No  Stem Cell Collection (by MD): No       Placenta    Placenta delivery date/time: 2020 231  Placenta removal: Spontaneous  Placenta appearance: Intact  Placenta disposition: discarded           Labor Events:        labor: No     Labor Onset Date/Time:         Dilation Complete Date/Time:         Start Pushing Date/Time:         Start Pushing Date/Time:       Rupture Date/Time: 20  1255         Rupture type:          Fluid Amount:       Fluid Color:       Fluid Odor:       Membrane Status: ARM (Artificial Rupture)               steroids: None     Antibiotics given for GBS: No     Induction: balloon dilation ([place]);misoprostol;oxytocin     Indications for induction:  Elective     Augmentation: oxytocin     Indications for augmentation: Ineffective Contraction Pattern     Labor complications: None     Additional complications:          Cervical ripening:                     Delivery:      Episiotomy: None     Indication for Episiotomy:       Perineal Lacerations: None Repaired:      Periurethral Laceration:   Repaired:     Labial Laceration:   Repaired:     Sulcus Laceration:   Repaired:     Vaginal Laceration:   Repaired:     Cervical Laceration:   Repaired:     Repair suture:       Repair # of packets: 8     Last Value - EBL - Nursing (mL):       Sum - EBL - Nursing (mL): 0     Last Value - EBL - Anesthesia (mL):      Calculated QBL (mL): 655      Vaginal Sweep Performed: Yes     Surgicount Correct: Yes       Other providers:       Anesthesia    Method: Epidural          Details (if applicable):  Trial of Labor Yes    Categorization: Primary    Priority: Routine   Indications for : Failure to Progress   Incision Type: low transverse     Additional  information:  Forceps:    Vacuum:    Breech:    Observed anomalies    Other (Comments):

## 2020-12-22 NOTE — DISCHARGE INSTRUCTIONS
Discharge Instructions    The AAP recommends exclusive breastfeeding for about the first 6 months of age and as solid foods are introduced, continued breastfeeding  for at least 1 year or longer.     Feed the baby at the earliest sign of hunger or comfort (see signs on the back cover of the Mother's Breastfeeding Guide)  o Hands to mouth, sucking motions  o Rooting or searching for something to suck on  o Dont wait for crying - it is a sign of distress     The feedings may be 8-12 times per 24hrs and will not follow a schedule   Avoid pacifiers and bottles for the first 4 weeks   Alternate the breast you start the feeding with, or start with the breast that feels the fullest   Switch breasts when the baby takes himself off the breast or falls asleep   Keep offering breasts until the baby looks full, no longer gives hunger signs, and stays asleep when placed on his back in the crib   If the baby is sleepy and wont wake for a feeding, put the baby skin-to-skin dressed in a diaper against the mothers bare chest   Sleep with your baby in your room near you   The baby should be positioned and latched on to the breast correctly  o Chest-to-chest, chin in the breast  o Babys lips are flipped outward  o Babys mouth is stretched open wide like a shout  o Babys sucking should feel like tugging to the mother  - The baby should be drinking at the breast:  o You should hear swallowing or gulping throughout the feeding  o You should see milk on the babys lips when he comes off the breast  o Your breasts should be softer when the baby is finished feeding  o The baby should look relaxed at the end of feedings  o After the 4th day and your milk is in:  o The babys poop should turn bright yellow and be loose, watery, and seedy  o The baby should have at least 3-4 poops the size of the palm of your hand per day  o The baby should have at least 5-6 wet diapers per day  o The urine should be light yellow in  color  You should drink when you are thirsty and eat a healthy diet when you are    hungry.     Take naps to get the rest you need.   Take medications and/or drink alcohol only with permission of your obstetrician    or the babys pediatrician.  You can also call the [facility] Risk [facility],   ([phone]), Monday-Friday, 8am-5pm Central time, to get the most   up-to-date evidence-based information on the use of medications during   pregnancy and breastfeeding.      Complete the First Alert form in the Mother's Breastfeeding Guide 3-5 days after the baby's birth.  Please call the Breastfeeding Warmline ([phone]) or the baby's pediatrician if you have any concerns.    The baby should be examined by a pediatrician at 3-5 days of age and again at 2 weeks of age.  Once your milk production increases, the baby should be gaining at least ½ - 1oz each day and should be back to birthweight no later than 10-14 days of age.  If this is not the case, please call the Breastfeeding Warmline ([phone]) for assistance and support.    Community Resources    [facility] Breastfeeding Warmline: [phone]   Local [facility] clinics: provide incentives and breastpumps to eligible mothers 4-908-969-BABY  La Leche League International (LLLI):  mother-to-mother support group website        www.lll.org  Local La Leche League mother-to-mother support groups:        www.lllXillient Communications.MetaChannels        La Leche League [facility]   Dr. Soy Allred website for latch videos and general information:        www.breastfeedinginc.ca  [facility] is a call center that provides information about the safety of taking medications while breastfeeding.  Call 6-607-376-0674, M-F, 8am-5pm, [place].  International Lactation Consultant Association provides resources for assistance:        www.ilca.org  [place] Breastfeeding Coalition provides informationand resources for parents  and the community    www.LaBreastfeedingSupport.org  Hayley Martin  is a mom-to-mom support group:                             www.nolanesting.PhoRent//breastfeedng-support/  [employer] for Healthy Babies:  8-833-127-BABY(2416)  [employer]: a breastfeeding support group for women of color, [phone]

## 2020-12-22 NOTE — PROGRESS NOTES
"LABOR NOTE    S:  Complaints: Anxiety.  very concerned, saying things such as "are we just TRYING to wait until she gets chorio? I mean do we WANT the baby to be in respiratory distress? She has had all these things done to her all day and we are very concerned."  Long discussion followed about normal labor curve, definititions of faield IOL, arrest of dilation, etc. Reported he "just wants to know that they can have a CS if they want it," and was reassured that if they elect for primary CS at any time that will be accommodated.    Epidural working: yes     O: Temp:  [96.6 °F (35.9 °C)-98.2 °F (36.8 °C)] 98.1 °F (36.7 °C)  Pulse:  [] 78  Resp:  [16-20] 16  SpO2:  [95 %-100 %] 100 %  BP: ()/(53-94) 129/80     FHT: Baseline 135, mod btbv, + accels, - decels Cat 1 (reassuring)  CTX: q 2 min  SVE:/-2, IUPC in place    ASSESSMENT:   30 y.o.  at 40w3d, IOL, FHT reassuring    Labor course:  0030: /-3; cytotec given & castillo refused  0430: /-3, castillo placed & another cytotec given  0900: /-3, castillo balloon removed. Too high to rupture. Will start pitocin.  1300: /-2, AROM  1630: no change, IUPC placed (per staff OB)  1930: /-2, adequate ctx, want to discuss primary CS but will wait until next check top decide.     PLAN:  Continue Close Maternal/Fetal Monitoring  Pitocin Augmentation per protocol  Recheck 2 hours or PRN    Paulo Wright M.D., PGY4  Obstetrics & Gynecology         "

## 2020-12-23 PROCEDURE — 11000001 HC ACUTE MED/SURG PRIVATE ROOM

## 2020-12-23 PROCEDURE — 25000003 PHARM REV CODE 250: Performed by: STUDENT IN AN ORGANIZED HEALTH CARE EDUCATION/TRAINING PROGRAM

## 2020-12-23 PROCEDURE — 99231 PR SUBSEQUENT HOSPITAL CARE,LEVL I: ICD-10-PCS | Mod: ,,, | Performed by: STUDENT IN AN ORGANIZED HEALTH CARE EDUCATION/TRAINING PROGRAM

## 2020-12-23 PROCEDURE — 99231 SBSQ HOSP IP/OBS SF/LOW 25: CPT | Mod: ,,, | Performed by: STUDENT IN AN ORGANIZED HEALTH CARE EDUCATION/TRAINING PROGRAM

## 2020-12-23 RX ORDER — CALCIUM CARBONATE 200(500)MG
500 TABLET,CHEWABLE ORAL 3 TIMES DAILY PRN
Status: DISCONTINUED | OUTPATIENT
Start: 2020-12-23 | End: 2020-12-25 | Stop reason: HOSPADM

## 2020-12-23 RX ORDER — IBUPROFEN 600 MG/1
600 TABLET ORAL EVERY 6 HOURS PRN
Qty: 30 TABLET | Refills: 1 | Status: SHIPPED | OUTPATIENT
Start: 2020-12-23 | End: 2021-03-15

## 2020-12-23 RX ORDER — OXYCODONE AND ACETAMINOPHEN 5; 325 MG/1; MG/1
1 TABLET ORAL EVERY 4 HOURS PRN
Qty: 28 TABLET | Refills: 0 | Status: SHIPPED | OUTPATIENT
Start: 2020-12-23 | End: 2021-03-15

## 2020-12-23 RX ORDER — DOCUSATE SODIUM 100 MG/1
200 CAPSULE, LIQUID FILLED ORAL 2 TIMES DAILY
Qty: 30 CAPSULE | Refills: 0 | Status: SHIPPED | OUTPATIENT
Start: 2020-12-23 | End: 2021-03-15

## 2020-12-23 RX ADMIN — BUPROPION HYDROCHLORIDE 300 MG: 150 TABLET, FILM COATED, EXTENDED RELEASE ORAL at 07:12

## 2020-12-23 RX ADMIN — OXYCODONE HYDROCHLORIDE AND ACETAMINOPHEN 1 TABLET: 5; 325 TABLET ORAL at 07:12

## 2020-12-23 RX ADMIN — PRENATAL VIT W/ FE FUMARATE-FA TAB 27-0.8 MG 1 TABLET: 27-0.8 TAB at 07:12

## 2020-12-23 RX ADMIN — IBUPROFEN 600 MG: 600 TABLET, FILM COATED ORAL at 11:12

## 2020-12-23 RX ADMIN — SIMETHICONE CHEW TAB 80 MG 80 MG: 80 TABLET ORAL at 11:12

## 2020-12-23 RX ADMIN — IBUPROFEN 600 MG: 600 TABLET, FILM COATED ORAL at 12:12

## 2020-12-23 RX ADMIN — OXYCODONE HYDROCHLORIDE AND ACETAMINOPHEN 1 TABLET: 5; 325 TABLET ORAL at 02:12

## 2020-12-23 RX ADMIN — CALCIUM CARBONATE (ANTACID) CHEW TAB 500 MG 500 MG: 500 CHEW TAB at 01:12

## 2020-12-23 RX ADMIN — MUPIROCIN: 20 OINTMENT TOPICAL at 08:12

## 2020-12-23 RX ADMIN — IBUPROFEN 600 MG: 600 TABLET, FILM COATED ORAL at 05:12

## 2020-12-23 RX ADMIN — DOCUSATE SODIUM 200 MG: 100 CAPSULE, LIQUID FILLED ORAL at 08:12

## 2020-12-23 RX ADMIN — DOCUSATE SODIUM 200 MG: 100 CAPSULE, LIQUID FILLED ORAL at 07:12

## 2020-12-23 RX ADMIN — SIMETHICONE CHEW TAB 80 MG 80 MG: 80 TABLET ORAL at 12:12

## 2020-12-23 RX ADMIN — SIMETHICONE CHEW TAB 80 MG 80 MG: 80 TABLET ORAL at 05:12

## 2020-12-23 NOTE — PROGRESS NOTES
POSTPARTUM PROGRESS NOTE     Soha Brown is a 30 y.o. 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    Patient is doing well this morning. She denies nausea, vomiting, fever or chills. Patient reports mild abdominal pain that is adequately relieved by oral pain medications. Lochia is mild to moderate and stable. Patient is voiding without difficulty and ambulating with no difficulty. She has passed flatus.    Patient does plan to breast feed. Will discuss contraception with primary OB.     Objective:       Temp:  [97.5 °F (36.4 °C)-98.1 °F (36.7 °C)] 98.1 °F (36.7 °C)  Pulse:  [86-95] 95  Resp:  [17-18] 17  SpO2:  [97 %-99 %] 98 %  BP: (106-116)/(58-64) 106/64    General:   alert, appears stated age and cooperative   Lungs:   Non-labored respirations    Heart:   regular rate and rhythm   Abdomen:  Soft, nondistended    Uterus:  firm located at the umbilicus.    Incision: Clean, dry and intact   Extremities: no pedal edema noted     Lab Review  No results found for this or any previous visit (from the past 4 hour(s)).    I/O    Intake/Output Summary (Last 24 hours) at 2020 0638  Last data filed at 2020  Gross per 24 hour   Intake --   Output 3450 ml   Net -3450 ml        Assessment:     Patient Active Problem List   Diagnosis    Pregnancy with one fetus, antepartum    Encounter for elective induction of labor    Encounter for supervision of normal first pregnancy in third trimester    Delivery by elective  section    Tobacco use    H/O: depression    Obesity affecting pregnancy in third trimester    Genital HSV        Plan:   1. Postpartum care:  - Patient doing well. Continue routine management and advances.  - Continue PO pain meds. Pain well controlled.  - Heme: H/h  >>> 32.8  - Encourage ambulation  - Contraception to be discussed at 6  week pp visit  - Lactation consult PRN  - Rh +    2. Depression  - Continue home Wellbutrin daily      Dispo: As patient meets milestones, will plan to discharge POD#2-4.      aYsmany Britton M.D.  OB/GYN PGY-1

## 2020-12-24 PROCEDURE — 99231 PR SUBSEQUENT HOSPITAL CARE,LEVL I: ICD-10-PCS | Mod: ,,, | Performed by: OBSTETRICS & GYNECOLOGY

## 2020-12-24 PROCEDURE — 99231 SBSQ HOSP IP/OBS SF/LOW 25: CPT | Mod: ,,, | Performed by: OBSTETRICS & GYNECOLOGY

## 2020-12-24 PROCEDURE — 25000003 PHARM REV CODE 250: Performed by: STUDENT IN AN ORGANIZED HEALTH CARE EDUCATION/TRAINING PROGRAM

## 2020-12-24 PROCEDURE — 11000001 HC ACUTE MED/SURG PRIVATE ROOM

## 2020-12-24 RX ADMIN — SIMETHICONE CHEW TAB 80 MG 80 MG: 80 TABLET ORAL at 02:12

## 2020-12-24 RX ADMIN — MAGNESIUM HYDROXIDE 2400 MG: 400 SUSPENSION ORAL at 08:12

## 2020-12-24 RX ADMIN — SIMETHICONE CHEW TAB 80 MG 80 MG: 80 TABLET ORAL at 08:12

## 2020-12-24 RX ADMIN — IBUPROFEN 600 MG: 600 TABLET, FILM COATED ORAL at 02:12

## 2020-12-24 RX ADMIN — PRENATAL VIT W/ FE FUMARATE-FA TAB 27-0.8 MG 1 TABLET: 27-0.8 TAB at 09:12

## 2020-12-24 RX ADMIN — IBUPROFEN 600 MG: 600 TABLET, FILM COATED ORAL at 05:12

## 2020-12-24 RX ADMIN — DOCUSATE SODIUM 200 MG: 100 CAPSULE, LIQUID FILLED ORAL at 09:12

## 2020-12-24 RX ADMIN — IBUPROFEN 600 MG: 600 TABLET, FILM COATED ORAL at 08:12

## 2020-12-24 RX ADMIN — OXYCODONE HYDROCHLORIDE AND ACETAMINOPHEN 1 TABLET: 5; 325 TABLET ORAL at 08:12

## 2020-12-24 RX ADMIN — SIMETHICONE CHEW TAB 80 MG 80 MG: 80 TABLET ORAL at 05:12

## 2020-12-24 RX ADMIN — OXYCODONE HYDROCHLORIDE AND ACETAMINOPHEN 1 TABLET: 5; 325 TABLET ORAL at 01:12

## 2020-12-24 RX ADMIN — DOCUSATE SODIUM 200 MG: 100 CAPSULE, LIQUID FILLED ORAL at 08:12

## 2020-12-24 RX ADMIN — OXYCODONE HYDROCHLORIDE AND ACETAMINOPHEN 1 TABLET: 5; 325 TABLET ORAL at 09:12

## 2020-12-24 RX ADMIN — MUPIROCIN: 20 OINTMENT TOPICAL at 09:12

## 2020-12-24 RX ADMIN — BUPROPION HYDROCHLORIDE 300 MG: 150 TABLET, FILM COATED, EXTENDED RELEASE ORAL at 09:12

## 2020-12-24 NOTE — PROGRESS NOTES
POSTPARTUM PROGRESS NOTE     Soha Brown is a 30 y.o. 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    Patient is doing well this morning. She denies nausea, vomiting, fever or chills. Patient reports mild abdominal pain that is adequately relieved by oral pain medications. Lochia is mild to moderate and stable. Patient is voiding without difficulty and ambulating with no difficulty. She has passed flatus.    Patient does plan to breast feed. Will discuss contraception with primary OB.     Objective:       Temp:  [97.3 °F (36.3 °C)-98 °F (36.7 °C)] 97.6 °F (36.4 °C)  Pulse:  [71-87] 87  Resp:  [16-20] 18  SpO2:  [97 %-100 %] 98 %  BP: ()/(56-63) 118/63    General:   alert, appears stated age and cooperative   Lungs:   Non-labored respirations    Heart:   regular rate and rhythm   Abdomen:  Soft, nondistended    Uterus:  firm   Incision: Clean, dry and intact   Extremities: no pedal edema noted     Lab Review  No results found for this or any previous visit (from the past 4 hour(s)).    I/O  No intake or output data in the 24 hours ending 20 1428     Assessment:     Patient Active Problem List   Diagnosis    Pregnancy with one fetus, antepartum    Encounter for elective induction of labor    Encounter for supervision of normal first pregnancy in third trimester    Delivery by elective  section    Tobacco use    H/O: depression    Obesity affecting pregnancy in third trimester    Genital HSV        Plan:   1. Postpartum care:  - Patient doing well. Continue routine management and advances.  - Continue PO pain meds. Pain well controlled.  - Heme: H/h  >>> 32.8  - Encourage ambulation  - Contraception to be discussed at 6 week pp visit  - Lactation consult PRN  - Rh +    2. Depression  - Continue home Wellbutrin daily    Dispo: Discharge today.      Anita Viera M.D.  [facility] PGY2

## 2020-12-24 NOTE — PROGRESS NOTES
POSTPARTUM PROGRESS NOTE     Soha Brown is a 30 y.o. 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    Patient is doing well this morning. She denies nausea, vomiting, fever or chills. Patient reports mild abdominal pain that is adequately relieved by oral pain medications. Lochia is mild to moderate and stable. Patient is voiding without difficulty and ambulating with no difficulty. She has passed flatus.    Patient does plan to breast feed. Will discuss contraception with primary OB.     Objective:       Temp:  [97.5 °F (36.4 °C)-98.2 °F (36.8 °C)] 98 °F (36.7 °C)  Pulse:  [84-97] 84  Resp:  [18-20] 18  SpO2:  [97 %-100 %] 97 %  BP: (106-124)/(56-71) 107/56    General:   alert, appears stated age and cooperative   Lungs:   Non-labored respirations    Heart:   regular rate and rhythm   Abdomen:  Soft, nondistended    Uterus:  firm located at the umbilicus.    Incision: Clean, dry and intact   Extremities: no pedal edema noted     Lab Review  No results found for this or any previous visit (from the past 4 hour(s)).    I/O  No intake or output data in the 24 hours ending 20 5864     Assessment:     Patient Active Problem List   Diagnosis    Pregnancy with one fetus, antepartum    Encounter for elective induction of labor    Encounter for supervision of normal first pregnancy in third trimester    Delivery by elective  section    Tobacco use    H/O: depression    Obesity affecting pregnancy in third trimester    Genital HSV        Plan:   1. Postpartum care:  - Patient doing well. Continue routine management and advances.  - Continue PO pain meds. Pain well controlled.  - Heme: H/h  >>> 32.8  - Encourage ambulation  - Contraception to be discussed at 6 week pp visit  - Lactation consult PRN  - Rh +    2. Depression  - Continue home Wellbutrin  daily    Dispo: As patient meets milestones, will plan to discharge POD#2-4.    Celeste Sanabria MD  OBGYN PGY-2

## 2020-12-24 NOTE — PHYSICIAN QUERY
PT Name: Soha Brown  MR #: [id number]     OB HEMATOLOGICAL CLARIFICATION     CDS/: BOUCHRA Sierra,RNC-MNN         Contact information:[email or web address]  This form is a permanent document in the medical record.    Query Date: December 24, 2020  By submitting this query, we are merely seeking further clarification of documentation. Please utilize your independent clinical judgment when addressing the question(s) below.      Indicators Supporting Clinical Findings Location in Medical Record   X Documentation of uterine atony with bleeding, post-partum bleeding, or hemorrhage Uterine atony     Estimated Blood Loss:  655 cc L&D Delivery note 12/23    Documentation of retained placenta     X Delivery type with EBL or QBL Procedure: pLTCS    Estimated Blood Loss:  655 cc L&D Delivery note 12/23   X H/H Hgb=11.4-->11.1  Hct=34.8-->32.8 LAB 12/20-12/22    Vital signs     X Medications, Treatment methergine given  L&D Delivery note 12/23    Blood transfusion      Other          Provider, please specify the diagnosis or diagnoses associated with the above clinical findings:      [  x ] Postpartum uterine atony without hemorrhage   [   ] Other hematological diagnosis (please specify): _________________   [  ] Clinically undetermined        Please document in your progress notes daily for the duration of treatment, until resolved, and include in your discharge summary.  (Form 56964)

## 2020-12-25 VITALS
DIASTOLIC BLOOD PRESSURE: 63 MMHG | SYSTOLIC BLOOD PRESSURE: 102 MMHG | RESPIRATION RATE: 18 BRPM | TEMPERATURE: 98 F | OXYGEN SATURATION: 97 % | HEART RATE: 89 BPM

## 2020-12-25 PROCEDURE — 25000003 PHARM REV CODE 250: Performed by: STUDENT IN AN ORGANIZED HEALTH CARE EDUCATION/TRAINING PROGRAM

## 2020-12-25 PROCEDURE — 99238 HOSP IP/OBS DSCHRG MGMT 30/<: CPT | Mod: ,,, | Performed by: STUDENT IN AN ORGANIZED HEALTH CARE EDUCATION/TRAINING PROGRAM

## 2020-12-25 PROCEDURE — 99238 PR HOSPITAL DISCHARGE DAY,<30 MIN: ICD-10-PCS | Mod: ,,, | Performed by: STUDENT IN AN ORGANIZED HEALTH CARE EDUCATION/TRAINING PROGRAM

## 2020-12-25 RX ADMIN — PRENATAL VIT W/ FE FUMARATE-FA TAB 27-0.8 MG 1 TABLET: 27-0.8 TAB at 09:12

## 2020-12-25 RX ADMIN — OXYCODONE HYDROCHLORIDE AND ACETAMINOPHEN 1 TABLET: 5; 325 TABLET ORAL at 06:12

## 2020-12-25 RX ADMIN — DOCUSATE SODIUM 200 MG: 100 CAPSULE, LIQUID FILLED ORAL at 09:12

## 2020-12-25 RX ADMIN — SIMETHICONE CHEW TAB 80 MG 80 MG: 80 TABLET ORAL at 02:12

## 2020-12-25 RX ADMIN — IBUPROFEN 600 MG: 600 TABLET, FILM COATED ORAL at 09:12

## 2020-12-25 RX ADMIN — OXYCODONE HYDROCHLORIDE AND ACETAMINOPHEN 1 TABLET: 5; 325 TABLET ORAL at 12:12

## 2020-12-25 RX ADMIN — BUPROPION HYDROCHLORIDE 300 MG: 150 TABLET, FILM COATED, EXTENDED RELEASE ORAL at 09:12

## 2020-12-25 RX ADMIN — IBUPROFEN 600 MG: 600 TABLET, FILM COATED ORAL at 02:12

## 2020-12-25 NOTE — LACTATION NOTE
Breastfeeding discharge done, breastfeeding guide reviewed. Cross cradle position reviewed and signs of a good latch. Pt verbalized understanding. Pt has warmline number to call after discharge for breastfeeding questions.     12/25/20 0900   Maternal Assessment   Breast Shape Bilateral:;round   Breast Density filling   Areola Bilateral:;elastic   Nipples Bilateral:;graspable;everted   Maternal Infant Feeding   Maternal Emotional State anxious   Infant Positioning clutch/football;cross-cradle   Signs of Milk Transfer audible swallow;infant jaw motion present;suck/swallow ratio   Pain with Feeding yes  (on initial latch)   Pain Location nipple, left   Pain Description soreness   Comfort Measures Before/During Feeding infant position adjusted;latch adjusted;maternal position adjusted   Latch Assistance no

## 2020-12-25 NOTE — LACTATION NOTE
"   12/24/20 2732   Maternal Assessment   Breast Shape Left:;round   Breast Density Left:;filling;soft   Maternal Infant Feeding   Maternal Emotional State independent   Infant Positioning clutch/football   Signs of Milk Transfer audible swallow;infant jaw motion present   Pain with Feeding yes  ("slight tenderness" then 0)   Latch Assistance no   Lactation note:  To room to assist with feeding. Mom had trouble setting up pillows but able to latch baby after a few minutes. Infant nursing effectively in football hold. Encouraged continued feeds 8 or more times in 24 hours with cues until content. Left  phone number for further needs.   "

## 2020-12-25 NOTE — PLAN OF CARE
Lactation note:  To room to assist with breastfeeding. Mom able to latch baby independently and infant nursing effectively. Mom notes some trouble with positioning using our pillows but we discussed some ways to help this issue. There is slight tenderness with initial latch then goes away as baby nurses. Encouraged continued feedings 8 or more times in 24 hours with cues until content. Mom to call as needed for more assistance.

## 2020-12-25 NOTE — DISCHARGE SUMMARY
Delivery Discharge Summary  Obstetrics      Primary OB Clinician: Julie R. Jeansonne, MD      Admission date: 2020  Discharge date: 2020    Disposition: To home, self care    Discharge Diagnosis List:      Patient Active Problem List   Diagnosis    Pregnancy with one fetus, antepartum    Encounter for elective induction of labor    Encounter for supervision of normal first pregnancy in third trimester    Delivery by elective  section    Tobacco use    H/O: depression    Obesity affecting pregnancy in third trimester    Genital HSV       Procedure: , due to primary elective    Hospital Course:  Soha Brown is a 30 y.o. now , POD #4 who was admitted on 2020 at 40w3d for IOL. Patient was subsequently admitted to labor and delivery unit with signed consents.     Labor course was uncomplicated but patient and her  decided to proceed to . Patient had been in labor for 24 hours and had been ROM for 12 hours with meconium. Patient and  were concerned about HSV though patient had multiple speculum exams with no evidence of lesions and had been on valtrex since 36 weeks gestation. Patient was brought to OR and proceed with  with no complications.    Please see delivery note for further details. Her postpartum course was uncomplicated. On discharge day, patient's pain is controlled with oral pain medications. Pt is tolerating ambulation without SOB or CP, and regular diet without N/V. Reports lochia is mild. Denies any HA, vision changes, F/C, LE swelling. Denies any breast pain/soreness.    Pt in stable condition and ready for discharge. She has been instructed to start and/or continue medications and follow up with her obstetrics provider as listed below.    Pertinent studies:  CBC  Recent Labs   Lab 20  2206 20  0657   WBC 7.81 14.76*   HGB 11.4* 11.1*   HCT 34.8* 32.8*   MCV 90 88    204          Immunization  History   Administered Date(s) Administered    Influenza - Quadrivalent - PF *Preferred* (6 months and older) 10/09/2020    Tdap 2020        Delivery:    Episiotomy: None   Lacerations: None   Repair suture:     Repair # of packets: 8   Blood loss (ml):       Birth information:  YOB: 2020   Time of birth: 11:13 PM   Sex: female   Delivery type: , Low Transverse   Gestational Age: 40w3d    Delivery Clinician:      Other providers:       Additional  information:  Forceps:    Vacuum:    Breech:    Observed anomalies      Living?:           APGARS  One minute Five minutes Ten minutes   Skin color:         Heart rate:         Grimace:         Muscle tone:         Breathing:         Totals: 8  9        Placenta: Delivered:       appearance      Patient Instructions:   Current Discharge Medication List      START taking these medications    Details   docusate sodium (COLACE) 100 MG capsule Take 2 capsules (200 mg total) by mouth 2 (two) times daily.  Qty: 30 capsule, Refills: 0      ibuprofen (ADVIL,MOTRIN) 600 MG tablet Take 1 tablet (600 mg total) by mouth every 6 (six) hours as needed for Pain.  Qty: 30 tablet, Refills: 1      oxyCODONE-acetaminophen (PERCOCET) 5-325 mg per tablet Take 1 tablet by mouth every 4 (four) hours as needed.  Qty: 28 tablet, Refills: 0    Comments: Quantity prescribed more than 7 day supply? No         CONTINUE these medications which have NOT CHANGED    Details   buPROPion (WELLBUTRIN XL) 300 MG 24 hr tablet       melatonin 1 mg Tab Take by mouth.      PNV no.95/ferrous fum/folic ac (PRENATAL ORAL) Take by mouth.      valACYclovir (VALTREX) 500 MG tablet              Discharge Procedure Orders   BREAST PUMP FOR HOME USE     Order Specific Question Answer Comments   Type of pump: Electric    Weight: 239    Length of need (1-99 months): 99      Diet Adult Regular     Ice to affected area     Other restrictions (specify):   Order Comments: Do not drive while on  narcotics. Do not drive until you feel comfortable placing your foot on the break without pain/discomfort.     Pelvic Rest   Order Comments: Do not put anything in your vagina until evaluated by your doctor at your next visit. This includes douching, tampons, sex.     Notify your health care provider if you experience any of the following:  temperature >100.4     Notify your health care provider if you experience any of the following:  persistent nausea and vomiting or diarrhea     Notify your health care provider if you experience any of the following:  severe uncontrolled pain     Notify your health care provider if you experience any of the following:  redness, tenderness, or signs of infection (pain, swelling, redness, odor or green/yellow discharge around incision site)     Notify your health care provider if you experience any of the following:  difficulty breathing or increased cough     Notify your health care provider if you experience any of the following:  severe persistent headache     Notify your health care provider if you experience any of the following:  worsening rash     Notify your health care provider if you experience any of the following:  persistent dizziness, light-headedness, or visual disturbances     Notify your health care provider if you experience any of the following:  increased confusion or weakness     Remove dressing in 24 hours     Activity as tolerated       Follow-up Information     Julie R Jeansonne, MD. Schedule an appointment as soon as possible for a visit in 6 weeks.    Specialty: Obstetrics and Gynecology  Why: Postpartum Visit  Contact information:  [address] [id number] [phone]                    Anita Viera M.D.  OBGYN PGY2

## 2020-12-28 ENCOUNTER — TELEPHONE (OUTPATIENT)
Dept: OBSTETRICS AND GYNECOLOGY | Facility: CLINIC | Age: 30
End: 2020-12-28

## 2020-12-28 NOTE — TELEPHONE ENCOUNTER
----- Message from Bety Peoples sent at 2020  8:38 AM CST -----  Patient called to make an appt.  Patient says Dr. Jeansonne wanted to see her within two weeks of her . No appts available within two weeks but I was able to give her an appt on 21 at 10:15am.  Patient wants to know if this is OK.  Patient can be reached at [phone]

## 2020-12-31 ENCOUNTER — POSTPARTUM VISIT (OUTPATIENT)
Dept: OBSTETRICS AND GYNECOLOGY | Facility: CLINIC | Age: 30
End: 2020-12-31
Payer: MEDICAID

## 2020-12-31 ENCOUNTER — PATIENT MESSAGE (OUTPATIENT)
Dept: OBSTETRICS AND GYNECOLOGY | Facility: CLINIC | Age: 30
End: 2020-12-31

## 2020-12-31 VITALS
WEIGHT: 234.81 LBS | BODY MASS INDEX: 37.74 KG/M2 | HEIGHT: 66 IN | SYSTOLIC BLOOD PRESSURE: 126 MMHG | DIASTOLIC BLOOD PRESSURE: 84 MMHG

## 2020-12-31 DIAGNOSIS — R30.0 DYSURIA: ICD-10-CM

## 2020-12-31 LAB
BILIRUB SERPL-MCNC: NORMAL MG/DL
BLOOD URINE, POC: NORMAL
CLARITY, POC UA: CLEAR
COLOR, POC UA: NORMAL
GLUCOSE UR QL STRIP: NORMAL
KETONES UR QL STRIP: NORMAL
LEUKOCYTE ESTERASE URINE, POC: NORMAL
NITRITE, POC UA: NORMAL
PH, POC UA: 5
PROTEIN, POC: NORMAL
SPECIFIC GRAVITY, POC UA: 1.01
UROBILINOGEN, POC UA: NORMAL

## 2020-12-31 PROCEDURE — 59430 PR CARE AFTER DELIVERY ONLY: ICD-10-PCS | Mod: ,,, | Performed by: OBSTETRICS & GYNECOLOGY

## 2020-12-31 PROCEDURE — 99999 PR PBB SHADOW E&M-EST. PATIENT-LVL III: ICD-10-PCS | Mod: PBBFAC,,, | Performed by: OBSTETRICS & GYNECOLOGY

## 2020-12-31 PROCEDURE — 99999 PR PBB SHADOW E&M-EST. PATIENT-LVL III: CPT | Mod: PBBFAC,,, | Performed by: OBSTETRICS & GYNECOLOGY

## 2020-12-31 PROCEDURE — 99213 OFFICE O/P EST LOW 20 MIN: CPT | Mod: PBBFAC | Performed by: OBSTETRICS & GYNECOLOGY

## 2020-12-31 PROCEDURE — 81002 URINALYSIS NONAUTO W/O SCOPE: CPT | Mod: PBBFAC | Performed by: OBSTETRICS & GYNECOLOGY

## 2020-12-31 NOTE — PROGRESS NOTES
Soha Brown is a 30 y.o. female  presents for a postpartum visit.  She is status post PCS 1 weeks ago.  Her hospitalization was not complicated.  She is breastfeeding.  She desires IUD - Mirena for contraception.  She denies postpartum depression.  She has not resumed menses since delivery. She has not resumed intercourse since delivery. + dysuria.     Her last pap was , neg.     ROS: per HPI    Past Medical History:   Diagnosis Date    Abnormal Pap smear of cervix     Herpes simplex virus (HSV) infection      Past Surgical History:   Procedure Laterality Date     SECTION N/A 2020    Procedure:  SECTION;  Surgeon: Julie R. Jeansonne, MD;  Location: [facility] L&D;  Service: OB/GYN;  Laterality: N/A;     Review of patient's allergies indicates:  No Known Allergies    Current Outpatient Medications:     buPROPion (WELLBUTRIN XL) 300 MG 24 hr tablet, , Disp: , Rfl:     docusate sodium (COLACE) 100 MG capsule, Take 2 capsules (200 mg total) by mouth 2 (two) times daily., Disp: 30 capsule, Rfl: 0    ibuprofen (ADVIL,MOTRIN) 600 MG tablet, Take 1 tablet (600 mg total) by mouth every 6 (six) hours as needed for Pain., Disp: 30 tablet, Rfl: 1    melatonin 1 mg Tab, Take by mouth., Disp: , Rfl:     oxyCODONE-acetaminophen (PERCOCET) 5-325 mg per tablet, Take 1 tablet by mouth every 4 (four) hours as needed., Disp: 28 tablet, Rfl: 0    PNV no.95/ferrous fum/folic ac (PRENATAL ORAL), Take by mouth., Disp: , Rfl:     valACYclovir (VALTREX) 500 MG tablet, , Disp: , Rfl:       Vitals:    20 0849   BP: 126/84       GENERAL: healthy, alert, no distress, cooperative, smiling  ABDOMEN: Normal, benign. and no masses, hepatosplenomegaly, no hernias. Incision healing well, no erythema, edema, drainage  PSYCH: nl affect    Assessment:  Normal postpartum exam  -Will order Mirena and place at 6 week PP visit.   -Urine dip for dysuria.     Plan:  Routine follow up.

## 2021-01-04 DIAGNOSIS — Z30.014 ENCOUNTER FOR INITIAL PRESCRIPTION OF INTRAUTERINE CONTRACEPTIVE DEVICE (IUD): Primary | ICD-10-CM

## 2021-02-12 ENCOUNTER — POSTPARTUM VISIT (OUTPATIENT)
Dept: OBSTETRICS AND GYNECOLOGY | Facility: CLINIC | Age: 31
End: 2021-02-12
Payer: MEDICAID

## 2021-02-12 VITALS
BODY MASS INDEX: 36.49 KG/M2 | DIASTOLIC BLOOD PRESSURE: 78 MMHG | SYSTOLIC BLOOD PRESSURE: 110 MMHG | HEIGHT: 66 IN | WEIGHT: 227.06 LBS

## 2021-02-12 DIAGNOSIS — Z30.430 ENCOUNTER FOR INSERTION OF MIRENA IUD: ICD-10-CM

## 2021-02-12 PROBLEM — Z34.90 ENCOUNTER FOR ELECTIVE INDUCTION OF LABOR: Status: RESOLVED | Noted: 2020-12-20 | Resolved: 2021-02-12

## 2021-02-12 PROBLEM — Z34.03 ENCOUNTER FOR SUPERVISION OF NORMAL FIRST PREGNANCY IN THIRD TRIMESTER: Status: RESOLVED | Noted: 2020-12-20 | Resolved: 2021-02-12

## 2021-02-12 PROBLEM — O99.213 OBESITY AFFECTING PREGNANCY IN THIRD TRIMESTER: Status: RESOLVED | Noted: 2020-12-21 | Resolved: 2021-02-12

## 2021-02-12 PROBLEM — Z34.90 PREGNANCY WITH ONE FETUS, ANTEPARTUM: Status: RESOLVED | Noted: 2020-06-08 | Resolved: 2021-02-12

## 2021-02-12 PROCEDURE — 99999 PR PBB SHADOW E&M-EST. PATIENT-LVL III: ICD-10-PCS | Mod: PBBFAC,,, | Performed by: NURSE PRACTITIONER

## 2021-02-12 PROCEDURE — 99213 OFFICE O/P EST LOW 20 MIN: CPT | Mod: PBBFAC | Performed by: NURSE PRACTITIONER

## 2021-02-12 PROCEDURE — 87086 URINE CULTURE/COLONY COUNT: CPT

## 2021-02-12 PROCEDURE — 81025 URINE PREGNANCY TEST: CPT | Mod: PBBFAC | Performed by: NURSE PRACTITIONER

## 2021-02-12 PROCEDURE — 58300 PR INSERT INTRAUTERINE DEVICE: ICD-10-PCS | Mod: S$PBB,,, | Performed by: NURSE PRACTITIONER

## 2021-02-12 PROCEDURE — 99999 PR PBB SHADOW E&M-EST. PATIENT-LVL III: CPT | Mod: PBBFAC,,, | Performed by: NURSE PRACTITIONER

## 2021-02-12 PROCEDURE — 0503F PR POSTPARTUM CARE VISIT: ICD-10-PCS | Mod: ,,, | Performed by: NURSE PRACTITIONER

## 2021-02-12 PROCEDURE — 58300 INSERT INTRAUTERINE DEVICE: CPT | Mod: S$PBB,,, | Performed by: NURSE PRACTITIONER

## 2021-02-12 PROCEDURE — 58300 INSERT INTRAUTERINE DEVICE: CPT | Mod: PBBFAC | Performed by: NURSE PRACTITIONER

## 2021-02-12 PROCEDURE — 0503F POSTPARTUM CARE VISIT: CPT | Mod: ,,, | Performed by: NURSE PRACTITIONER

## 2021-02-12 RX ORDER — DEXTROAMPHETAMINE SACCHARATE, AMPHETAMINE ASPARTATE MONOHYDRATE, DEXTROAMPHETAMINE SULFATE AND AMPHETAMINE SULFATE 3.75; 3.75; 3.75; 3.75 MG/1; MG/1; MG/1; MG/1
15 CAPSULE, EXTENDED RELEASE ORAL EVERY MORNING
COMMUNITY
End: 2023-01-11

## 2021-02-19 LAB — BACTERIA UR CULT: NO GROWTH

## 2021-03-15 ENCOUNTER — OFFICE VISIT (OUTPATIENT)
Dept: OBSTETRICS AND GYNECOLOGY | Facility: CLINIC | Age: 31
End: 2021-03-15
Payer: MEDICAID

## 2021-03-15 VITALS
SYSTOLIC BLOOD PRESSURE: 120 MMHG | WEIGHT: 222.44 LBS | DIASTOLIC BLOOD PRESSURE: 72 MMHG | BODY MASS INDEX: 35.75 KG/M2 | HEIGHT: 66 IN

## 2021-03-15 DIAGNOSIS — Z30.431 ENCOUNTER FOR ROUTINE CHECKING OF INTRAUTERINE CONTRACEPTIVE DEVICE (IUD): Primary | ICD-10-CM

## 2021-03-15 PROBLEM — Z97.5 IUD (INTRAUTERINE DEVICE) IN PLACE: Status: ACTIVE | Noted: 2021-03-15

## 2021-03-15 PROCEDURE — 99499 UNLISTED E&M SERVICE: CPT | Mod: S$PBB,,, | Performed by: NURSE PRACTITIONER

## 2021-03-15 PROCEDURE — 99499 NO LOS: ICD-10-PCS | Mod: S$PBB,,, | Performed by: NURSE PRACTITIONER

## 2021-03-15 PROCEDURE — 99999 PR PBB SHADOW E&M-EST. PATIENT-LVL III: ICD-10-PCS | Mod: PBBFAC,,, | Performed by: NURSE PRACTITIONER

## 2021-03-15 PROCEDURE — 99213 OFFICE O/P EST LOW 20 MIN: CPT | Mod: PBBFAC | Performed by: NURSE PRACTITIONER

## 2021-03-15 PROCEDURE — 99999 PR PBB SHADOW E&M-EST. PATIENT-LVL III: CPT | Mod: PBBFAC,,, | Performed by: NURSE PRACTITIONER

## 2021-03-15 RX ORDER — SERTRALINE HYDROCHLORIDE 50 MG/1
50 TABLET, FILM COATED ORAL DAILY
COMMUNITY
Start: 2021-03-04 | End: 2023-01-11

## 2021-04-13 ENCOUNTER — PATIENT MESSAGE (OUTPATIENT)
Dept: RESEARCH | Facility: HOSPITAL | Age: 31
End: 2021-04-13

## 2022-01-05 NOTE — LACTATION NOTE
12/23/20 1118   Maternal Assessment   Breast Shape Bilateral:;round   Breast Density Bilateral:;soft   Areola Bilateral:;elastic   Nipples Bilateral:;graspable   Maternal Infant Feeding   Maternal Emotional State assist needed   Infant Positioning clutch/football   Signs of Milk Transfer audible swallow;infant jaw motion present   Baby nursed well in football.DIANN left phone number on mother's white board for mother to call for asst as needed.Told mother what time LC leaves the floor. Mother also told that LC can see when she calls [employer] phone and if LC does not answer, she is busy but will come as soon as possible.     Minoxidil Counseling: Minoxidil is a topical medication which can increase blood flow where it is applied. It is uncertain how this medication increases hair growth. Side effects are uncommon and include stinging and allergic reactions.

## 2022-09-02 ENCOUNTER — PATIENT MESSAGE (OUTPATIENT)
Dept: OBSTETRICS AND GYNECOLOGY | Facility: CLINIC | Age: 32
End: 2022-09-02
Payer: COMMERCIAL

## 2022-09-02 RX ORDER — VALACYCLOVIR HYDROCHLORIDE 500 MG/1
500 TABLET, FILM COATED ORAL 2 TIMES DAILY
Qty: 6 TABLET | Refills: 3 | Status: SHIPPED | OUTPATIENT
Start: 2022-09-02 | End: 2022-09-05

## 2022-09-15 ENCOUNTER — PATIENT MESSAGE (OUTPATIENT)
Dept: OBSTETRICS AND GYNECOLOGY | Facility: CLINIC | Age: 32
End: 2022-09-15
Payer: COMMERCIAL

## 2022-09-26 ENCOUNTER — OFFICE VISIT (OUTPATIENT)
Dept: OBSTETRICS AND GYNECOLOGY | Facility: CLINIC | Age: 32
End: 2022-09-26
Payer: COMMERCIAL

## 2022-09-26 DIAGNOSIS — Z30.09 BIRTH CONTROL COUNSELING: Primary | ICD-10-CM

## 2022-09-26 PROCEDURE — 1159F PR MEDICATION LIST DOCUMENTED IN MEDICAL RECORD: ICD-10-PCS | Mod: CPTII,95,, | Performed by: OBSTETRICS & GYNECOLOGY

## 2022-09-26 PROCEDURE — 99213 PR OFFICE/OUTPT VISIT, EST, LEVL III, 20-29 MIN: ICD-10-PCS | Mod: 95,,, | Performed by: OBSTETRICS & GYNECOLOGY

## 2022-09-26 PROCEDURE — 1160F PR REVIEW ALL MEDS BY PRESCRIBER/CLIN PHARMACIST DOCUMENTED: ICD-10-PCS | Mod: CPTII,95,, | Performed by: OBSTETRICS & GYNECOLOGY

## 2022-09-26 PROCEDURE — 1160F RVW MEDS BY RX/DR IN RCRD: CPT | Mod: CPTII,95,, | Performed by: OBSTETRICS & GYNECOLOGY

## 2022-09-26 PROCEDURE — 1159F MED LIST DOCD IN RCRD: CPT | Mod: CPTII,95,, | Performed by: OBSTETRICS & GYNECOLOGY

## 2022-09-26 PROCEDURE — 99213 OFFICE O/P EST LOW 20 MIN: CPT | Mod: 95,,, | Performed by: OBSTETRICS & GYNECOLOGY

## 2022-09-26 NOTE — PROGRESS NOTES
The patient location is: home  The chief complaint leading to consultation is: IUD removal consult    Visit type: audiovisual    Face to Face time with patient: 10 min  15 minutes of total time spent on the encounter, which includes face to face time and non-face to face time preparing to see the patient (eg, review of tests), Obtaining and/or reviewing separately obtained history, Documenting clinical information in the electronic or other health record, Independently interpreting results (not separately reported) and communicating results to the patient/family/caregiver, or Care coordination (not separately reported).         Each patient to whom he or she provides medical services by telemedicine is:  (1) informed of the relationship between the physician and patient and the respective role of any other health care provider with respect to management of the patient; and (2) notified that he or she may decline to receive medical services by telemedicine and may withdraw from such care at any time.    Notes:       CC: discuss IUD removal    Soha presents wanting to discuss IUD removal. Wants removed soon to TTC. Would like to know about what to expect with removal. Living in [place] now, would like referral to MD there.     OB:  # 1 - Date: 20, Sex: Female, Weight: 3.77 kg (8 lb 5 oz), GA: 40w3d, Delivery: , Low Transverse, Apgar1: 8, Apgar5: 9, Living: Living, Birth Comments: None      Past Medical History:   Diagnosis Date    Abnormal Pap smear of cervix     Herpes simplex virus (HSV) infection        Past Surgical History:   Procedure Laterality Date     SECTION N/A 2020    Procedure:  SECTION;  Surgeon: Julie R. Jeansonne, MD;  Location: [facility] L&D;  Service: OB/GYN;  Laterality: N/A;         Current Outpatient Medications:     buPROPion (WELLBUTRIN XL) 300 MG 24 hr tablet, , Disp: , Rfl:     dextroamphetamine-amphetamine (ADDERALL XR) 15 MG 24 hr capsule, Take 15 mg by  mouth every morning., Disp: , Rfl:     melatonin 1 mg Tab, Take by mouth., Disp: , Rfl:     PNV no.95/ferrous fum/folic ac (PRENATAL ORAL), Take by mouth., Disp: , Rfl:     sertraline (ZOLOFT) 50 MG tablet, Take 50 mg by mouth once daily., Disp: , Rfl:     valACYclovir (VALTREX) 500 MG tablet, , Disp: , Rfl:         There were no vitals filed for this visit.      ROS:  GENERAL: No fever, chills, fatigability or weight loss.  VULVAR: No pain, no lesions and no itching.  VAGINAL: No relaxation, no itching, no discharge, no abnormal bleeding and no lesions.  ABDOMEN: No abdominal pain. Denies nausea. Denies vomiting. No diarrhea. No constipation  BREAST: Denies pain. No lumps. No discharge.  URINARY: No incontinence, no nocturia, no frequency and no dysuria.  CARDIOVASCULAR: No chest pain. No shortness of breath. No leg cramps.  NEUROLOGICAL: No headaches. No vision changes.    PHYSICAL EXAM:  GEN: NAD  Resp: nl effort  Psych: nl affect  Neuro: no focal deficits  Skin: warm and dry    ASSESSMENT and PLAN:  1) Birth control planning  -we discussed what to expect with IUD removal. Will send message to Dr. Gao's office in [place] to try to help with getting her scheduled. All questions answered.

## 2022-09-27 ENCOUNTER — TELEPHONE (OUTPATIENT)
Dept: OBSTETRICS AND GYNECOLOGY | Facility: CLINIC | Age: 32
End: 2022-09-27
Payer: COMMERCIAL

## 2022-09-27 NOTE — TELEPHONE ENCOUNTER
----- Message from Cele Gao MD sent at 9/27/2022  8:11 AM CDT -----  Regarding: FW: Referral  Can we contact patient about apt?  ----- Message -----  From: Julie R. Jeansonne, MD  Sent: 9/26/2022   4:29 PM CDT  To: Cele Gao MD  Subject: Referral                                         Vida Oakley!    This is a really sweet patient of mine that just moved to the [facility]. She is looking to have her IUD removed to try to get pregnant and wanted a referral for that as well as future OB care. Would you be willing to see her? She would also be ok with seeing another provider if you are booked for the IUD removal but see you in the future for her OB care.     Thanks!    Julie Jeansonne

## 2022-10-19 ENCOUNTER — PATIENT MESSAGE (OUTPATIENT)
Dept: OBSTETRICS AND GYNECOLOGY | Facility: CLINIC | Age: 32
End: 2022-10-19
Payer: COMMERCIAL

## 2022-10-20 ENCOUNTER — OFFICE VISIT (OUTPATIENT)
Dept: OBSTETRICS AND GYNECOLOGY | Facility: CLINIC | Age: 32
End: 2022-10-20
Payer: COMMERCIAL

## 2022-10-20 VITALS
BODY MASS INDEX: 31.39 KG/M2 | SYSTOLIC BLOOD PRESSURE: 124 MMHG | DIASTOLIC BLOOD PRESSURE: 72 MMHG | HEIGHT: 66 IN | WEIGHT: 195.31 LBS | RESPIRATION RATE: 16 BRPM

## 2022-10-20 DIAGNOSIS — Z30.432 ENCOUNTER FOR IUD REMOVAL: Primary | ICD-10-CM

## 2022-10-20 LAB
B-HCG UR QL: NEGATIVE
CTP QC/QA: YES

## 2022-10-20 PROCEDURE — 99499 NO LOS: ICD-10-PCS | Mod: S$GLB,,, | Performed by: STUDENT IN AN ORGANIZED HEALTH CARE EDUCATION/TRAINING PROGRAM

## 2022-10-20 PROCEDURE — 3078F DIAST BP <80 MM HG: CPT | Mod: CPTII,S$GLB,, | Performed by: STUDENT IN AN ORGANIZED HEALTH CARE EDUCATION/TRAINING PROGRAM

## 2022-10-20 PROCEDURE — 1159F MED LIST DOCD IN RCRD: CPT | Mod: CPTII,S$GLB,, | Performed by: STUDENT IN AN ORGANIZED HEALTH CARE EDUCATION/TRAINING PROGRAM

## 2022-10-20 PROCEDURE — 81025 URINE PREGNANCY TEST: CPT | Mod: S$GLB,,, | Performed by: STUDENT IN AN ORGANIZED HEALTH CARE EDUCATION/TRAINING PROGRAM

## 2022-10-20 PROCEDURE — 99999 PR PBB SHADOW E&M-EST. PATIENT-LVL III: ICD-10-PCS | Mod: PBBFAC,,, | Performed by: STUDENT IN AN ORGANIZED HEALTH CARE EDUCATION/TRAINING PROGRAM

## 2022-10-20 PROCEDURE — 58301 REMOVE INTRAUTERINE DEVICE: CPT | Mod: S$GLB,,, | Performed by: STUDENT IN AN ORGANIZED HEALTH CARE EDUCATION/TRAINING PROGRAM

## 2022-10-20 PROCEDURE — 81025 POCT URINE PREGNANCY: ICD-10-PCS | Mod: S$GLB,,, | Performed by: STUDENT IN AN ORGANIZED HEALTH CARE EDUCATION/TRAINING PROGRAM

## 2022-10-20 PROCEDURE — 3078F PR MOST RECENT DIASTOLIC BLOOD PRESSURE < 80 MM HG: ICD-10-PCS | Mod: CPTII,S$GLB,, | Performed by: STUDENT IN AN ORGANIZED HEALTH CARE EDUCATION/TRAINING PROGRAM

## 2022-10-20 PROCEDURE — 1159F PR MEDICATION LIST DOCUMENTED IN MEDICAL RECORD: ICD-10-PCS | Mod: CPTII,S$GLB,, | Performed by: STUDENT IN AN ORGANIZED HEALTH CARE EDUCATION/TRAINING PROGRAM

## 2022-10-20 PROCEDURE — 3074F PR MOST RECENT SYSTOLIC BLOOD PRESSURE < 130 MM HG: ICD-10-PCS | Mod: CPTII,S$GLB,, | Performed by: STUDENT IN AN ORGANIZED HEALTH CARE EDUCATION/TRAINING PROGRAM

## 2022-10-20 PROCEDURE — 3074F SYST BP LT 130 MM HG: CPT | Mod: CPTII,S$GLB,, | Performed by: STUDENT IN AN ORGANIZED HEALTH CARE EDUCATION/TRAINING PROGRAM

## 2022-10-20 PROCEDURE — 99999 PR PBB SHADOW E&M-EST. PATIENT-LVL III: CPT | Mod: PBBFAC,,, | Performed by: STUDENT IN AN ORGANIZED HEALTH CARE EDUCATION/TRAINING PROGRAM

## 2022-10-20 PROCEDURE — 58301 REMOVAL OF IUD: ICD-10-PCS | Mod: S$GLB,,, | Performed by: STUDENT IN AN ORGANIZED HEALTH CARE EDUCATION/TRAINING PROGRAM

## 2022-10-20 PROCEDURE — 99499 UNLISTED E&M SERVICE: CPT | Mod: S$GLB,,, | Performed by: STUDENT IN AN ORGANIZED HEALTH CARE EDUCATION/TRAINING PROGRAM

## 2022-10-20 NOTE — PROCEDURES
Removal of IUD    Date/Time: 10/20/2022 9:00 AM  Performed by: Cele Gao MD  Authorized by: Cele Gao MD     Consent obtained:  Written  Consent given by:  Patient  Procedure risks and benefits discussed: yes    Patient questions answered: yes    Patient agrees, verbalizes understanding, and wants to proceed: yes    Educational handouts given: yes    Instructions and paperwork completed: yes    IUD grasped by: ring forceps  Other reason for removal:  Attempting pregnancy  Removed with no complications: yes      Start PNV    Cele Gao M.D.  Obstetrics and Gynecology

## 2022-11-04 ENCOUNTER — PATIENT MESSAGE (OUTPATIENT)
Dept: OBSTETRICS AND GYNECOLOGY | Facility: CLINIC | Age: 32
End: 2022-11-04
Payer: COMMERCIAL

## 2023-01-09 ENCOUNTER — PATIENT MESSAGE (OUTPATIENT)
Dept: OBSTETRICS AND GYNECOLOGY | Facility: CLINIC | Age: 33
End: 2023-01-09
Payer: COMMERCIAL

## 2023-01-11 ENCOUNTER — OFFICE VISIT (OUTPATIENT)
Dept: OBSTETRICS AND GYNECOLOGY | Facility: CLINIC | Age: 33
End: 2023-01-11
Payer: COMMERCIAL

## 2023-01-11 VITALS
BODY MASS INDEX: 32.67 KG/M2 | DIASTOLIC BLOOD PRESSURE: 80 MMHG | SYSTOLIC BLOOD PRESSURE: 120 MMHG | WEIGHT: 202.38 LBS

## 2023-01-11 DIAGNOSIS — Z30.9 ENCOUNTER FOR CONTRACEPTIVE MANAGEMENT, UNSPECIFIED TYPE: Primary | ICD-10-CM

## 2023-01-11 PROCEDURE — 3074F PR MOST RECENT SYSTOLIC BLOOD PRESSURE < 130 MM HG: ICD-10-PCS | Mod: CPTII,S$GLB,, | Performed by: OBSTETRICS & GYNECOLOGY

## 2023-01-11 PROCEDURE — 99999 PR PBB SHADOW E&M-EST. PATIENT-LVL III: CPT | Mod: PBBFAC,,, | Performed by: OBSTETRICS & GYNECOLOGY

## 2023-01-11 PROCEDURE — 99213 PR OFFICE/OUTPT VISIT, EST, LEVL III, 20-29 MIN: ICD-10-PCS | Mod: S$GLB,,, | Performed by: OBSTETRICS & GYNECOLOGY

## 2023-01-11 PROCEDURE — 1160F PR REVIEW ALL MEDS BY PRESCRIBER/CLIN PHARMACIST DOCUMENTED: ICD-10-PCS | Mod: CPTII,S$GLB,, | Performed by: OBSTETRICS & GYNECOLOGY

## 2023-01-11 PROCEDURE — 99213 OFFICE O/P EST LOW 20 MIN: CPT | Mod: S$GLB,,, | Performed by: OBSTETRICS & GYNECOLOGY

## 2023-01-11 PROCEDURE — 1159F MED LIST DOCD IN RCRD: CPT | Mod: CPTII,S$GLB,, | Performed by: OBSTETRICS & GYNECOLOGY

## 2023-01-11 PROCEDURE — 3079F DIAST BP 80-89 MM HG: CPT | Mod: CPTII,S$GLB,, | Performed by: OBSTETRICS & GYNECOLOGY

## 2023-01-11 PROCEDURE — 99999 PR PBB SHADOW E&M-EST. PATIENT-LVL III: ICD-10-PCS | Mod: PBBFAC,,, | Performed by: OBSTETRICS & GYNECOLOGY

## 2023-01-11 PROCEDURE — 1160F RVW MEDS BY RX/DR IN RCRD: CPT | Mod: CPTII,S$GLB,, | Performed by: OBSTETRICS & GYNECOLOGY

## 2023-01-11 PROCEDURE — 1159F PR MEDICATION LIST DOCUMENTED IN MEDICAL RECORD: ICD-10-PCS | Mod: CPTII,S$GLB,, | Performed by: OBSTETRICS & GYNECOLOGY

## 2023-01-11 PROCEDURE — 3079F PR MOST RECENT DIASTOLIC BLOOD PRESSURE 80-89 MM HG: ICD-10-PCS | Mod: CPTII,S$GLB,, | Performed by: OBSTETRICS & GYNECOLOGY

## 2023-01-11 PROCEDURE — 3008F BODY MASS INDEX DOCD: CPT | Mod: CPTII,S$GLB,, | Performed by: OBSTETRICS & GYNECOLOGY

## 2023-01-11 PROCEDURE — 3074F SYST BP LT 130 MM HG: CPT | Mod: CPTII,S$GLB,, | Performed by: OBSTETRICS & GYNECOLOGY

## 2023-01-11 PROCEDURE — 3008F PR BODY MASS INDEX (BMI) DOCUMENTED: ICD-10-PCS | Mod: CPTII,S$GLB,, | Performed by: OBSTETRICS & GYNECOLOGY

## 2023-01-11 RX ORDER — ESCITALOPRAM OXALATE 20 MG/1
20 TABLET ORAL
COMMUNITY
Start: 2023-01-03

## 2023-01-11 RX ORDER — MEDROXYPROGESTERONE ACETATE 150 MG/ML
150 INJECTION, SUSPENSION INTRAMUSCULAR
Status: COMPLETED | OUTPATIENT
Start: 2023-01-11 | End: 2023-01-17

## 2023-01-11 NOTE — PROGRESS NOTES
Chief Complaint   Patient presents with    \[facility]\"" Care    Contraception     Discuss interest in paraguard IUD        History of Present Illness   32 y.o.    patient presents today for discuss contraception, had IUD Mirena in Oct. Desires to discuss options, thinking about ParaGaurd    Past medical and surgical history reviewed.   I have reviewed the patient's medical history in detail and updated the computerized patient record.    Review of patient's allergies indicates:  No Known Allergies      Review of Systems -  GEN ROS: negative  Breast ROS: negative  Genito-Urinary ROS: negative      Physical Examination:  /80   Wt 91.8 kg (202 lb 6.1 oz)   LMP 2022   BMI 32.67 kg/m²          Assessment:    1. Encounter for contraceptive management, unspecified type            Plan:  Discuss options  Rec depo provera  UPT neg  Patient informed will be contacted with results within 2 weeks. Encouraged to please call back or email if she has not heard from us by then.

## 2023-01-17 ENCOUNTER — CLINICAL SUPPORT (OUTPATIENT)
Dept: OBSTETRICS AND GYNECOLOGY | Facility: CLINIC | Age: 33
End: 2023-01-17
Payer: COMMERCIAL

## 2023-01-17 DIAGNOSIS — Z30.9 ENCOUNTER FOR CONTRACEPTIVE MANAGEMENT, UNSPECIFIED TYPE: Primary | ICD-10-CM

## 2023-01-17 PROCEDURE — 96372 PR INJECTION,THERAP/PROPH/DIAG2ST, IM OR SUBCUT: ICD-10-PCS | Mod: S$GLB,,, | Performed by: OBSTETRICS & GYNECOLOGY

## 2023-01-17 PROCEDURE — 96372 THER/PROPH/DIAG INJ SC/IM: CPT | Mod: S$GLB,,, | Performed by: OBSTETRICS & GYNECOLOGY

## 2023-01-17 PROCEDURE — 99999 PR PBB SHADOW E&M-EST. PATIENT-LVL I: ICD-10-PCS | Mod: PBBFAC,,,

## 2023-01-17 PROCEDURE — 99999 PR PBB SHADOW E&M-EST. PATIENT-LVL I: CPT | Mod: PBBFAC,,,

## 2023-01-17 RX ORDER — MEDROXYPROGESTERONE ACETATE 150 MG/ML
150 INJECTION, SUSPENSION INTRAMUSCULAR
Status: CANCELLED | OUTPATIENT
Start: 2023-01-17 | End: 2023-01-17

## 2023-01-17 RX ADMIN — MEDROXYPROGESTERONE ACETATE 150 MG: 150 INJECTION, SUSPENSION INTRAMUSCULAR at 10:01

## 2023-01-17 NOTE — PROGRESS NOTES
Patient arrived on time for appointment. Allergies verified. Denied any complaints. Advised patient to wait 15 minutes to observe for an allergic reaction. Informed patient on when next shot it due. Apr 4 - 18. Patient verbalized understanding.

## 2023-02-02 ENCOUNTER — OFFICE VISIT (OUTPATIENT)
Dept: PRIMARY CARE CLINIC | Facility: CLINIC | Age: 33
End: 2023-02-02
Payer: COMMERCIAL

## 2023-02-02 ENCOUNTER — LAB VISIT (OUTPATIENT)
Dept: LAB | Facility: HOSPITAL | Age: 33
End: 2023-02-02
Attending: INTERNAL MEDICINE
Payer: COMMERCIAL

## 2023-02-02 VITALS
DIASTOLIC BLOOD PRESSURE: 72 MMHG | SYSTOLIC BLOOD PRESSURE: 112 MMHG | BODY MASS INDEX: 32.31 KG/M2 | HEART RATE: 76 BPM | WEIGHT: 201.06 LBS | OXYGEN SATURATION: 99 % | HEIGHT: 66 IN

## 2023-02-02 DIAGNOSIS — R53.83 FATIGUE, UNSPECIFIED TYPE: Primary | ICD-10-CM

## 2023-02-02 DIAGNOSIS — F32.5 DEPRESSION, MAJOR, IN REMISSION: ICD-10-CM

## 2023-02-02 DIAGNOSIS — E66.9 OBESITY (BMI 30-39.9): ICD-10-CM

## 2023-02-02 DIAGNOSIS — Z72.89 VAPES NON-NICOTINE CONTAINING SUBSTANCE: ICD-10-CM

## 2023-02-02 DIAGNOSIS — Z00.00 PREVENTATIVE HEALTH CARE: ICD-10-CM

## 2023-02-02 DIAGNOSIS — R53.83 FATIGUE, UNSPECIFIED TYPE: ICD-10-CM

## 2023-02-02 PROBLEM — Z97.5 IUD (INTRAUTERINE DEVICE) IN PLACE: Status: RESOLVED | Noted: 2021-03-15 | Resolved: 2023-02-02

## 2023-02-02 PROBLEM — F17.200 VAPING NICOTINE DEPENDENCE, NON-TOBACCO PRODUCT: Status: ACTIVE | Noted: 2023-02-02

## 2023-02-02 PROBLEM — Z72.0 TOBACCO USE: Status: RESOLVED | Noted: 2020-12-21 | Resolved: 2023-02-02

## 2023-02-02 PROBLEM — F32.9 MAJOR DEPRESSIVE DISORDER: Status: ACTIVE | Noted: 2023-02-02

## 2023-02-02 LAB
ALBUMIN SERPL BCP-MCNC: 4.5 G/DL (ref 3.5–5.2)
ALP SERPL-CCNC: 54 U/L (ref 55–135)
ALT SERPL W/O P-5'-P-CCNC: 24 U/L (ref 10–44)
ANION GAP SERPL CALC-SCNC: 8 MMOL/L (ref 8–16)
AST SERPL-CCNC: 20 U/L (ref 10–40)
BASOPHILS # BLD AUTO: 0.02 K/UL (ref 0–0.2)
BASOPHILS NFR BLD: 0.5 % (ref 0–1.9)
BILIRUB SERPL-MCNC: 0.5 MG/DL (ref 0.1–1)
BUN SERPL-MCNC: 11 MG/DL (ref 6–20)
CALCIUM SERPL-MCNC: 9.6 MG/DL (ref 8.7–10.5)
CHLORIDE SERPL-SCNC: 109 MMOL/L (ref 95–110)
CHOLEST SERPL-MCNC: 214 MG/DL (ref 120–199)
CHOLEST/HDLC SERPL: 3.8 {RATIO} (ref 2–5)
CO2 SERPL-SCNC: 23 MMOL/L (ref 23–29)
CREAT SERPL-MCNC: 0.8 MG/DL (ref 0.5–1.4)
DIFFERENTIAL METHOD: ABNORMAL
EOSINOPHIL # BLD AUTO: 0.1 K/UL (ref 0–0.5)
EOSINOPHIL NFR BLD: 1.1 % (ref 0–8)
ERYTHROCYTE [DISTWIDTH] IN BLOOD BY AUTOMATED COUNT: 13.2 % (ref 11.5–14.5)
EST. GFR  (NO RACE VARIABLE): >60 ML/MIN/1.73 M^2
ESTIMATED AVG GLUCOSE: 103 MG/DL (ref 68–131)
GLUCOSE SERPL-MCNC: 87 MG/DL (ref 70–110)
HBA1C MFR BLD: 5.2 % (ref 4–5.6)
HCT VFR BLD AUTO: 41.8 % (ref 37–48.5)
HCV AB SERPL QL IA: NORMAL
HDLC SERPL-MCNC: 57 MG/DL (ref 40–75)
HDLC SERPL: 26.6 % (ref 20–50)
HGB BLD-MCNC: 13.2 G/DL (ref 12–16)
IMM GRANULOCYTES # BLD AUTO: 0 K/UL (ref 0–0.04)
IMM GRANULOCYTES NFR BLD AUTO: 0 % (ref 0–0.5)
LDLC SERPL CALC-MCNC: 144.4 MG/DL (ref 63–159)
LYMPHOCYTES # BLD AUTO: 2.4 K/UL (ref 1–4.8)
LYMPHOCYTES NFR BLD: 54.8 % (ref 18–48)
MCH RBC QN AUTO: 28.3 PG (ref 27–31)
MCHC RBC AUTO-ENTMCNC: 31.6 G/DL (ref 32–36)
MCV RBC AUTO: 90 FL (ref 82–98)
MONOCYTES # BLD AUTO: 0.3 K/UL (ref 0.3–1)
MONOCYTES NFR BLD: 5.9 % (ref 4–15)
NEUTROPHILS # BLD AUTO: 1.7 K/UL (ref 1.8–7.7)
NEUTROPHILS NFR BLD: 37.7 % (ref 38–73)
NONHDLC SERPL-MCNC: 157 MG/DL
NRBC BLD-RTO: 0 /100 WBC
PLATELET # BLD AUTO: 312 K/UL (ref 150–450)
PMV BLD AUTO: 10.2 FL (ref 9.2–12.9)
POTASSIUM SERPL-SCNC: 4.3 MMOL/L (ref 3.5–5.1)
PROT SERPL-MCNC: 7.5 G/DL (ref 6–8.4)
RBC # BLD AUTO: 4.67 M/UL (ref 4–5.4)
SODIUM SERPL-SCNC: 140 MMOL/L (ref 136–145)
T4 FREE SERPL-MCNC: 0.75 NG/DL (ref 0.71–1.51)
TRIGL SERPL-MCNC: 63 MG/DL (ref 30–150)
TSH SERPL DL<=0.005 MIU/L-ACNC: 1.12 UIU/ML (ref 0.4–4)
WBC # BLD AUTO: 4.4 K/UL (ref 3.9–12.7)

## 2023-02-02 PROCEDURE — 1159F MED LIST DOCD IN RCRD: CPT | Mod: CPTII,S$GLB,, | Performed by: INTERNAL MEDICINE

## 2023-02-02 PROCEDURE — 3008F PR BODY MASS INDEX (BMI) DOCUMENTED: ICD-10-PCS | Mod: CPTII,S$GLB,, | Performed by: INTERNAL MEDICINE

## 2023-02-02 PROCEDURE — 99999 PR PBB SHADOW E&M-EST. PATIENT-LVL III: ICD-10-PCS | Mod: PBBFAC,,, | Performed by: INTERNAL MEDICINE

## 2023-02-02 PROCEDURE — 99385 PR PREVENTIVE VISIT,NEW,18-39: ICD-10-PCS | Mod: S$GLB,,, | Performed by: INTERNAL MEDICINE

## 2023-02-02 PROCEDURE — 86803 HEPATITIS C AB TEST: CPT | Performed by: INTERNAL MEDICINE

## 2023-02-02 PROCEDURE — 99385 PREV VISIT NEW AGE 18-39: CPT | Mod: S$GLB,,, | Performed by: INTERNAL MEDICINE

## 2023-02-02 PROCEDURE — 1159F PR MEDICATION LIST DOCUMENTED IN MEDICAL RECORD: ICD-10-PCS | Mod: CPTII,S$GLB,, | Performed by: INTERNAL MEDICINE

## 2023-02-02 PROCEDURE — 3074F PR MOST RECENT SYSTOLIC BLOOD PRESSURE < 130 MM HG: ICD-10-PCS | Mod: CPTII,S$GLB,, | Performed by: INTERNAL MEDICINE

## 2023-02-02 PROCEDURE — 85025 COMPLETE CBC W/AUTO DIFF WBC: CPT | Performed by: INTERNAL MEDICINE

## 2023-02-02 PROCEDURE — 84439 ASSAY OF FREE THYROXINE: CPT | Performed by: INTERNAL MEDICINE

## 2023-02-02 PROCEDURE — 3078F DIAST BP <80 MM HG: CPT | Mod: CPTII,S$GLB,, | Performed by: INTERNAL MEDICINE

## 2023-02-02 PROCEDURE — 99999 PR PBB SHADOW E&M-EST. PATIENT-LVL III: CPT | Mod: PBBFAC,,, | Performed by: INTERNAL MEDICINE

## 2023-02-02 PROCEDURE — 80061 LIPID PANEL: CPT | Performed by: INTERNAL MEDICINE

## 2023-02-02 PROCEDURE — 3008F BODY MASS INDEX DOCD: CPT | Mod: CPTII,S$GLB,, | Performed by: INTERNAL MEDICINE

## 2023-02-02 PROCEDURE — 83036 HEMOGLOBIN GLYCOSYLATED A1C: CPT | Performed by: INTERNAL MEDICINE

## 2023-02-02 PROCEDURE — 80053 COMPREHEN METABOLIC PANEL: CPT | Performed by: INTERNAL MEDICINE

## 2023-02-02 PROCEDURE — 3078F PR MOST RECENT DIASTOLIC BLOOD PRESSURE < 80 MM HG: ICD-10-PCS | Mod: CPTII,S$GLB,, | Performed by: INTERNAL MEDICINE

## 2023-02-02 PROCEDURE — 36415 COLL VENOUS BLD VENIPUNCTURE: CPT | Mod: PN | Performed by: INTERNAL MEDICINE

## 2023-02-02 PROCEDURE — 3074F SYST BP LT 130 MM HG: CPT | Mod: CPTII,S$GLB,, | Performed by: INTERNAL MEDICINE

## 2023-02-02 PROCEDURE — 84443 ASSAY THYROID STIM HORMONE: CPT | Performed by: INTERNAL MEDICINE

## 2023-02-02 RX ORDER — NALTREXONE HYDROCHLORIDE 50 MG/1
50 TABLET, FILM COATED ORAL DAILY
COMMUNITY
End: 2023-04-05
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
## 2023-02-03 NOTE — ASSESSMENT & PLAN NOTE
-Start healthy diet high in fiber (25-35 mg daily), low fat dairy, lean protein, low in saturated/trans fat; high in calcium/magnesium/potassium; 1.5 gm sodium diet; low in processed sugars and foods, ie avoid WHITE sugars, bread, pasta, rice, ice cream  -Drink 6-8 glasses of water daily  -Moderate exercise 30 min 5 times per week or 75 min intense exercise  -Start 8-10 hour intermittent fasting diet   -Avoid eating 3 hours prior to bedtime  -Reduce alcohol to 1 glass per day  -Avoid vaping   -Avoid NSAIDs      
-cont Wellbutrin XL 300mg daily and Lexapro 20mg po daily   -Cont Naltrexone 50mg daily   -f/u psych/therapy   
-encouraged pt to stop  
-get labs today  -refuses flu, COVID bivalent booster  -get pap smear in 5/2023  
-stop vaping  
Stable

## 2023-04-05 ENCOUNTER — OFFICE VISIT (OUTPATIENT)
Dept: OBSTETRICS AND GYNECOLOGY | Facility: CLINIC | Age: 33
End: 2023-04-05
Payer: COMMERCIAL

## 2023-04-05 VITALS
DIASTOLIC BLOOD PRESSURE: 60 MMHG | WEIGHT: 201.25 LBS | SYSTOLIC BLOOD PRESSURE: 110 MMHG | BODY MASS INDEX: 32.34 KG/M2 | HEIGHT: 66 IN

## 2023-04-05 DIAGNOSIS — Z01.419 ROUTINE GYNECOLOGICAL EXAMINATION: Primary | ICD-10-CM

## 2023-04-05 DIAGNOSIS — Z30.9 ENCOUNTER FOR CONTRACEPTIVE MANAGEMENT, UNSPECIFIED TYPE: ICD-10-CM

## 2023-04-05 PROBLEM — Z00.00 PREVENTATIVE HEALTH CARE: Status: RESOLVED | Noted: 2020-12-20 | Resolved: 2023-04-05

## 2023-04-05 PROBLEM — Z86.59 H/O: DEPRESSION: Status: RESOLVED | Noted: 2020-12-21 | Resolved: 2023-04-05

## 2023-04-05 PROBLEM — F17.200 VAPING NICOTINE DEPENDENCE, NON-TOBACCO PRODUCT: Status: RESOLVED | Noted: 2023-02-02 | Resolved: 2023-04-05

## 2023-04-05 PROCEDURE — 3044F PR MOST RECENT HEMOGLOBIN A1C LEVEL <7.0%: ICD-10-PCS | Mod: CPTII,S$GLB,, | Performed by: OBSTETRICS & GYNECOLOGY

## 2023-04-05 PROCEDURE — 3008F PR BODY MASS INDEX (BMI) DOCUMENTED: ICD-10-PCS | Mod: CPTII,S$GLB,, | Performed by: OBSTETRICS & GYNECOLOGY

## 2023-04-05 PROCEDURE — 1159F MED LIST DOCD IN RCRD: CPT | Mod: CPTII,S$GLB,, | Performed by: OBSTETRICS & GYNECOLOGY

## 2023-04-05 PROCEDURE — 3078F PR MOST RECENT DIASTOLIC BLOOD PRESSURE < 80 MM HG: ICD-10-PCS | Mod: CPTII,S$GLB,, | Performed by: OBSTETRICS & GYNECOLOGY

## 2023-04-05 PROCEDURE — 3074F SYST BP LT 130 MM HG: CPT | Mod: CPTII,S$GLB,, | Performed by: OBSTETRICS & GYNECOLOGY

## 2023-04-05 PROCEDURE — 99999 PR PBB SHADOW E&M-EST. PATIENT-LVL III: ICD-10-PCS | Mod: PBBFAC,,, | Performed by: OBSTETRICS & GYNECOLOGY

## 2023-04-05 PROCEDURE — 99999 PR PBB SHADOW E&M-EST. PATIENT-LVL III: CPT | Mod: PBBFAC,,, | Performed by: OBSTETRICS & GYNECOLOGY

## 2023-04-05 PROCEDURE — 1160F RVW MEDS BY RX/DR IN RCRD: CPT | Mod: CPTII,S$GLB,, | Performed by: OBSTETRICS & GYNECOLOGY

## 2023-04-05 PROCEDURE — 88175 CYTOPATH C/V AUTO FLUID REDO: CPT | Performed by: OBSTETRICS & GYNECOLOGY

## 2023-04-05 PROCEDURE — 3008F BODY MASS INDEX DOCD: CPT | Mod: CPTII,S$GLB,, | Performed by: OBSTETRICS & GYNECOLOGY

## 2023-04-05 PROCEDURE — 99395 PR PREVENTIVE VISIT,EST,18-39: ICD-10-PCS | Mod: S$GLB,,, | Performed by: OBSTETRICS & GYNECOLOGY

## 2023-04-05 PROCEDURE — 1160F PR REVIEW ALL MEDS BY PRESCRIBER/CLIN PHARMACIST DOCUMENTED: ICD-10-PCS | Mod: CPTII,S$GLB,, | Performed by: OBSTETRICS & GYNECOLOGY

## 2023-04-05 PROCEDURE — 1159F PR MEDICATION LIST DOCUMENTED IN MEDICAL RECORD: ICD-10-PCS | Mod: CPTII,S$GLB,, | Performed by: OBSTETRICS & GYNECOLOGY

## 2023-04-05 PROCEDURE — 3078F DIAST BP <80 MM HG: CPT | Mod: CPTII,S$GLB,, | Performed by: OBSTETRICS & GYNECOLOGY

## 2023-04-05 PROCEDURE — 99395 PREV VISIT EST AGE 18-39: CPT | Mod: S$GLB,,, | Performed by: OBSTETRICS & GYNECOLOGY

## 2023-04-05 PROCEDURE — 3044F HG A1C LEVEL LT 7.0%: CPT | Mod: CPTII,S$GLB,, | Performed by: OBSTETRICS & GYNECOLOGY

## 2023-04-05 PROCEDURE — 3074F PR MOST RECENT SYSTOLIC BLOOD PRESSURE < 130 MM HG: ICD-10-PCS | Mod: CPTII,S$GLB,, | Performed by: OBSTETRICS & GYNECOLOGY

## 2023-04-05 RX ORDER — MEDROXYPROGESTERONE ACETATE 150 MG/ML
150 INJECTION, SUSPENSION INTRAMUSCULAR
Qty: 1 ML | Refills: 3 | Status: SHIPPED | OUTPATIENT
Start: 2023-04-05 | End: 2023-04-09

## 2023-04-05 NOTE — PROGRESS NOTES
PT HERE FOR ANNUAL.  NEEDS DEPO REFILL.    ROS:  GENERAL: No fever, chills, fatigability or weight loss.  VULVAR: No pain, no lesions and no itching.  VAGINAL: No relaxation, no itching, no discharge, no abnormal bleeding and no lesions.  ABDOMEN: No abdominal pain. Denies nausea. Denies vomiting. No diarrhea. No constipation  BREAST: Denies pain. No lumps. No discharge.  URINARY: No incontinence, no nocturia, no frequency and no dysuria.  CARDIOVASCULAR: No chest pain. No shortness of breath. No leg cramps.  NEUROLOGICAL: No headaches. No vision changes.  The remainder of the review of systems was negative.    PE:  General Appearance: overweight And Well developed. Well nourished. In no acute distress.  Vulva: Lesions: No.  Urethral Meatus: Normal size. Normal location. No lesions. No prolapse.  Urethra: No masses. No tenderness. No prolapse. No scarring.  Bladder: No masses. No tenderness.  Vagina: Mucosa NI:yes discharge no, atrophy no, cystocele no or rectocele no.  Cervix: Lesion: no  Stenotic: no Cervical motion tenderness: no  Uterus: Uterus size: 6 weeks. Support good. Uterus size: Normal  Adnexa: Masses: No Tenderness: No CDS Nodularity: No  Abdomen: overweight No masses. No tenderness.  Breasts: No bilateral masses. No bilateral discharge. No bilateral tenderness. No bilateral fibrocystic changes.  Neck: No thyroid enlargement. No thyroid masses.  Skin: Rashes: No      PROCEDURES:    PLAN:     DIAGNOSIS:  1. Routine gynecological examination    2. Encounter for contraceptive management, unspecified type        MEDICATIONS & ORDERS:  Orders Placed This Encounter    Liquid-Based Pap Smear, Screening    medroxyPROGESTERone (DEPO-PROVERA) 150 mg/mL Syrg       Patient was counseled today on the new ACS guidelines for cervical cytology screening as well as the current recommendations for breast cancer screening. She was counseled to follow up with her PCP for other routine health maintenance. Counseling session  lasted approximately 10 minutes, and all her questions were answered.     D/W PT ON TYPES OF RELIABLE BIRTH CONTROL AND RISKS AND BENEFITS  The use of hormonal contraception has been fully discussed with the patient. We discussed all options including OCPs, transdermal patches, vaginal ring, Depo Provera injections, Implanon, and IUD. Warnings about anticipated minor side effects such as breakthrough spotting, nausea, breast tenderness, weight changes, acne, headaches, etc were given. She has been told of the more serious potential side effects such as MI, stroke, and deep vein thrombosis, all of which are very unlikely. She has been asked to report any signs of such serious problems immediately. The need for additional protection, such as a condom, to prevent exposure to sexually transmitted diseases has also been discussed- the patient has been clearly reminded that no hormonal contraceptive method can protect her against diseases such as HIV and others. She understands and wishes to take the medication as prescribed.       FOLLOW-UP: With me in 12 month    Adrian Smith Jr, MD, FACOG

## 2024-06-17 NOTE — TELEPHONE ENCOUNTER
Comprehensive Nutrition Assessment    Type and Reason for Visit: Reassess  Malnutrition Screening Tool: Malnutrition Screen  Have you recently lost weight without trying?: 2 to 13 pounds (1 point)  Have you been eating poorly because of a decreased appetite?: Yes (1 point)  Malnutrition Screening Tool Score: 2  Consult for Parenteral Nutrition Management  (6/12 - Gen Surg)     Nutrition Recommendations/Plan:   Parenteral Nutrition:  Peripheral parenteral nutrition (Osmolarity 884)   Peripheral line infusion  Advance: Dex 5%, 4.25% AA 2 L (85ml/hr)   Resume 250 ml 20% lipids 3x/week  Average to provide: 894 kcal/day (63% of needs), 85 grams of protein/day (87% of needs), 72 grams of CHO/day and 2107 ml of total volume/day.   Above regimen: Unable to meet calorie goal related to PPN  Patient on PPN x 6 days. Please consider obtaining central access to better meet patient calorie needs in next 24 hours if pt remains unable to successfully advance diet/take PO.   Electrolytes/L:   Sodium ( 50 meq NaAcetate and 65 meq NaCl), Potassium ( 5 meq KCl) Phosphorus (none), Calcium (4.5 meq), Magnesium none  ~1:1 Acetate:Chloride ratio due to CKD V with NG OP/emesis throughout admission. Nephrology following.   Additives per bag:   MVI, MTE  Thiamine 100 mg daily x 7 days (EOT 6/18)  Folic Acid 1 mg daily x 7 days (EOT 6/18)  Nutrition Related Medication Management:  Electrolyte Replacement Protocol PRN Deferred due to CrCl    IVF:  NA  Labs:   BMP daily  Mg daily x 7 days at initiation then MWF  Phos daily x 7 days at initiation then MWF    Triglyceride weekly on Thursday  Hepatic Panel weekly on Thursday  POC Glucoses/SSI Not indicated  Meals and Snacks:  Diet: Continue current order  Nutrition Supplement Therapy:  Medical food supplement therapy:  Continue Nepro three times per day (this provides 420 kcal and 19 grams protein per bottle)     Malnutrition Assessment:  Malnutrition Status: At risk for malnutrition (Comment)  LVM for pt to call office, try to schedule lab for Monday, MT21 forms filled out and ready for her to  at office before lab appt   Supplement Intake  Physical Signs/Symptoms Outcomes: Biochemical Data, GI Status, Meal Time Behavior, Weight, Fluid Status or Edema    Discharge Planning:    Too soon to determine    Alicia Carias, RD   [phone]

## 2024-08-16 ENCOUNTER — OFFICE VISIT (OUTPATIENT)
Dept: OBSTETRICS AND GYNECOLOGY | Facility: CLINIC | Age: 34
End: 2024-08-16
Payer: COMMERCIAL

## 2024-08-16 VITALS
BODY MASS INDEX: 29.46 KG/M2 | DIASTOLIC BLOOD PRESSURE: 70 MMHG | HEIGHT: 66 IN | WEIGHT: 183.31 LBS | SYSTOLIC BLOOD PRESSURE: 102 MMHG

## 2024-08-16 DIAGNOSIS — Z01.419 WELL WOMAN EXAM WITH ROUTINE GYNECOLOGICAL EXAM: Primary | ICD-10-CM

## 2024-08-16 PROCEDURE — 99999 PR PBB SHADOW E&M-EST. PATIENT-LVL III: CPT | Mod: PBBFAC,,, | Performed by: STUDENT IN AN ORGANIZED HEALTH CARE EDUCATION/TRAINING PROGRAM

## 2024-08-16 RX ORDER — SEMAGLUTIDE 1.34 MG/ML
INJECTION, SOLUTION SUBCUTANEOUS
COMMUNITY
Start: 2024-07-30

## 2024-08-16 RX ORDER — DIETHYLPROPION HYDROCHLORIDE 25 MG/1
TABLET ORAL
COMMUNITY
Start: 2024-03-09

## 2024-08-16 NOTE — PROGRESS NOTES
[facility]  Obstetrics & Gynecology      History of Present Illness:   Annual exam. Interested in reviewing conception. Trying off and on  Menstrual History:  precocious puberty. Took GNRH. Once stopped , periods were normal. Was on deop until one year ago. Menses is 35-30CLdays. Bleeds for 7 days. 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  Sexually Active: one male partner  Family history: Denies any personal or family history of GYN/colon cancers   Social: Wears seatbelts. Exercises some. Feels safe at home. No severe depressive episodes recently. No issues with tobacco/vaping, EtOH, illicit drugs.  Last pap: 2023  HPV vaccine: completed  Vitamins: fish oil      Current Outpatient Medications on File Prior to Visit   Medication Sig    diethylpropion 25 mg Tab     magnesium oxide-Mg AA chelate 300 mg Cap     semaglutide (OZEMPIC) 0.25 mg or 0.5 mg(2 mg/1.5 mL) pen injector     [DISCONTINUED] buPROPion (WELLBUTRIN XL) 300 MG 24 hr tablet     [DISCONTINUED] EScitalopram oxalate (LEXAPRO) 20 MG tablet Take 20 mg by mouth.    [DISCONTINUED] medroxyPROGESTERone (DEPO-PROVERA) 150 mg/mL Syrg Inject 1 mL (150 mg total) into the muscle every 3 (three) months. for 4 days     No current facility-administered medications on file prior to visit.       Review of patient's allergies indicates:  No Known Allergies    Past Medical History:   Diagnosis Date    Herpes simplex virus (HSV) infection     Major depressive disorder 2023    Tobacco use 2020     OB History    Para Term  AB Living   1 1 1 0 0 1   SAB IAB Ectopic Multiple Live Births   0 0 0 0 1      # Outcome Date GA Lbr Sarthak/2nd Weight Sex Type Anes PTL Lv   1 Term 20 40w3d  3.77 kg (8 lb 5 oz) F CS-LTranv EPI N JJ      Name: MARY CARMEN STOCK      Apgar1: 8  Apgar5: 9     Past Surgical History:   Procedure Laterality Date      SECTION N/A 2020    Procedure:  SECTION;  Surgeon: Julie R. Jeansonne, MD;  Location: [facility] L&D;  Service: OB/GYN;  Laterality: N/A;     Family History       Problem Relation (Age of Onset)    Diabetes Paternal Grandmother, Father    Heart disease Maternal Grandfather    Miscarriages / Stillbirths Maternal Grandmother          Tobacco Use    Smoking status: Light Smoker     Current packs/day: 0.25     Types: Cigarettes    Smokeless tobacco: Never   Substance and Sexual Activity    Alcohol use: Not Currently    Drug use: Not Currently     Types: Marijuana    Sexual activity: Yes     Partners: Male     Birth control/protection: I.U.D.     Review of Systems   Constitutional:  Negative for activity change, appetite change, fever and unexpected weight change.   Respiratory:  Negative for shortness of breath.    Cardiovascular:  Negative for chest pain.   Gastrointestinal:  Negative for abdominal pain, blood in stool, constipation, diarrhea, nausea and vomiting.   Genitourinary:  Negative for dysmenorrhea, dyspareunia, dysuria, hematuria, menorrhagia, menstrual problem, pelvic pain, vaginal bleeding, vaginal discharge, vaginal pain, urinary incontinence, postcoital bleeding and vaginal odor.   Integumentary:  Negative for breast mass, nipple discharge and breast skin changes.   Breast: Negative for lump, mass, nipple discharge and skin changes    Objective:     Vital Signs (Most Recent):  BP: 102/70 (24 0918) Vital Signs (24h Range):  [unfilled]     Weight: 83.2 kg (183 lb 5 oz)  Body mass index is 29.59 kg/m².  Patient's last menstrual period was 08/10/2024.    Physical Exam:   Constitutional: She is oriented to person, place, and time. She appears well-developed and well-nourished. No distress.    HENT:   Head: Normocephalic and atraumatic.    Eyes: Conjunctivae and EOM are normal.    Neck: No thyromegaly present.    Cardiovascular:  Normal rate.             Pulmonary/Chest: Effort normal.  No respiratory distress. Right breast exhibits no inverted nipple, no mass, no nipple discharge, no skin change, no tenderness, presence, no bleeding and no swelling. Left breast exhibits no inverted nipple, no mass, no nipple discharge, no skin change, no tenderness, presence, no bleeding and no swelling.        Abdominal: Soft. She exhibits no distension. There is no abdominal tenderness.     Genitourinary:    Urethra, vagina, uterus, right adnexa and left adnexa normal.   The external female genitalia was normal.   Cervix is normal.           Musculoskeletal: Normal range of motion and moves all extremeties. No tenderness or edema.       Neurological: She is alert and oriented to person, place, and time.    Skin: Skin is warm and dry. No rash noted.    Psychiatric: She has a normal mood and affect. Her behavior is normal.         Assessment/Plan:     WWE  - Vaccines utd  - Pap utd  - Mammogram age 40  - GC/CT, affirm n/a  - Daily vitamin discussed.  - Reviewed cycle  mapping, TIC, OPK. AMH, timed Progesterone ordered. Follow  up 3 mo  - CBE normal. Physical exam normal. VSS.  - RTC for annual or PRN.      Leah Johnson MD  Obstetrics & Gynecology  [facility]

## 2024-08-16 NOTE — PATIENT INSTRUCTIONS
"[facility]  [email or web address]  [address] [place] [address]  [phone]    [facility]  [email or web address]  [address], [place] [id number] [phone]      Cycle length roughly 30 days. 30-14=16 (this should be the day roughly when ovulation happens). Have sex every day or every other day starting day 14-18.   Progesterone level will be drawn roughly 7 days prior to next menses to see if you ovulated  AMH (anti mullerian hormone) to see ovarian reserve ("how long do we have with the ovarian function")  "

## 2024-09-03 ENCOUNTER — LAB VISIT (OUTPATIENT)
Dept: LAB | Facility: HOSPITAL | Age: 34
End: 2024-09-03
Attending: STUDENT IN AN ORGANIZED HEALTH CARE EDUCATION/TRAINING PROGRAM
Payer: COMMERCIAL

## 2024-09-03 DIAGNOSIS — Z01.419 WELL WOMAN EXAM WITH ROUTINE GYNECOLOGICAL EXAM: ICD-10-CM

## 2024-09-03 LAB — PROGEST SERPL-MCNC: 10.7 NG/ML

## 2024-09-03 PROCEDURE — 82166 ASSAY ANTI-MULLERIAN HORM: CPT | Performed by: STUDENT IN AN ORGANIZED HEALTH CARE EDUCATION/TRAINING PROGRAM

## 2024-09-03 PROCEDURE — 84144 ASSAY OF PROGESTERONE: CPT | Performed by: STUDENT IN AN ORGANIZED HEALTH CARE EDUCATION/TRAINING PROGRAM

## 2024-09-03 PROCEDURE — 36415 COLL VENOUS BLD VENIPUNCTURE: CPT | Performed by: STUDENT IN AN ORGANIZED HEALTH CARE EDUCATION/TRAINING PROGRAM

## 2024-09-04 ENCOUNTER — PATIENT MESSAGE (OUTPATIENT)
Dept: OBSTETRICS AND GYNECOLOGY | Facility: CLINIC | Age: 34
End: 2024-09-04
Payer: COMMERCIAL

## 2024-09-05 LAB — MIS SERPL-MCNC: 4.7 NG/ML (ref 0.58–8.1)

## 2024-09-29 ENCOUNTER — PATIENT MESSAGE (OUTPATIENT)
Dept: OBSTETRICS AND GYNECOLOGY | Facility: CLINIC | Age: 34
End: 2024-09-29
Payer: COMMERCIAL

## 2024-09-30 ENCOUNTER — OFFICE VISIT (OUTPATIENT)
Dept: OBSTETRICS AND GYNECOLOGY | Facility: CLINIC | Age: 34
End: 2024-09-30
Payer: COMMERCIAL

## 2024-09-30 VITALS
DIASTOLIC BLOOD PRESSURE: 72 MMHG | BODY MASS INDEX: 28.63 KG/M2 | WEIGHT: 178.13 LBS | HEIGHT: 66 IN | SYSTOLIC BLOOD PRESSURE: 108 MMHG

## 2024-09-30 DIAGNOSIS — N76.0 VAGINITIS AND VULVOVAGINITIS: Primary | ICD-10-CM

## 2024-09-30 LAB
GARDNERELLA VAGINALIS: POSITIVE
OTHER MICROSC. OBSERVATIONS: ABNORMAL
POC BACTERIAL VAGINOSIS: ABNORMAL
POC CLUE CELLS: ABNORMAL
TRICHOMONAS, POC: NEGATIVE
YEAST WET PREP: NEGATIVE

## 2024-09-30 PROCEDURE — 99999 PR PBB SHADOW E&M-EST. PATIENT-LVL III: CPT | Mod: PBBFAC,,, | Performed by: STUDENT IN AN ORGANIZED HEALTH CARE EDUCATION/TRAINING PROGRAM

## 2024-09-30 PROCEDURE — 87491 CHLMYD TRACH DNA AMP PROBE: CPT | Performed by: STUDENT IN AN ORGANIZED HEALTH CARE EDUCATION/TRAINING PROGRAM

## 2024-09-30 PROCEDURE — 87591 N.GONORRHOEAE DNA AMP PROB: CPT | Performed by: STUDENT IN AN ORGANIZED HEALTH CARE EDUCATION/TRAINING PROGRAM

## 2024-09-30 RX ORDER — FLUCONAZOLE 150 MG/1
150 TABLET ORAL
Qty: 2 TABLET | Refills: 0 | Status: SHIPPED | OUTPATIENT
Start: 2024-09-30 | End: 2024-09-30

## 2024-09-30 RX ORDER — METRONIDAZOLE 500 MG/1
500 TABLET ORAL EVERY 12 HOURS
Qty: 14 TABLET | Refills: 0 | Status: SHIPPED | OUTPATIENT
Start: 2024-09-30 | End: 2024-10-07

## 2024-09-30 RX ORDER — AMOXICILLIN 500 MG
CAPSULE ORAL DAILY
COMMUNITY

## 2024-09-30 RX ORDER — FLUCONAZOLE 150 MG/1
150 TABLET ORAL
Qty: 2 TABLET | Refills: 0 | Status: SHIPPED | OUTPATIENT
Start: 2024-09-30
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
## 2024-10-02 LAB
C TRACH DNA SPEC QL NAA+PROBE: NOT DETECTED
N GONORRHOEA DNA SPEC QL NAA+PROBE: NOT DETECTED

## 2024-10-23 ENCOUNTER — PATIENT MESSAGE (OUTPATIENT)
Dept: OBSTETRICS AND GYNECOLOGY | Facility: CLINIC | Age: 34
End: 2024-10-23
Payer: COMMERCIAL

## 2024-10-23 DIAGNOSIS — N94.10 DYSPAREUNIA IN FEMALE: Primary | ICD-10-CM

## 2024-11-01 ENCOUNTER — HOSPITAL ENCOUNTER (OUTPATIENT)
Dept: RADIOLOGY | Facility: OTHER | Age: 34
Discharge: HOME | End: 2024-11-01
Attending: STUDENT IN AN ORGANIZED HEALTH CARE EDUCATION/TRAINING PROGRAM
Payer: COMMERCIAL

## 2024-11-01 DIAGNOSIS — N94.10 DYSPAREUNIA IN FEMALE: ICD-10-CM

## 2024-11-01 PROCEDURE — 76830 TRANSVAGINAL US NON-OB: CPT | Mod: 26,,, | Performed by: RADIOLOGY

## 2024-11-01 PROCEDURE — 76830 TRANSVAGINAL US NON-OB: CPT | Mod: TC

## 2024-11-01 PROCEDURE — 76856 US EXAM PELVIC COMPLETE: CPT | Mod: 26,,, | Performed by: RADIOLOGY

## 2024-11-01 PROCEDURE — 76856 US EXAM PELVIC COMPLETE: CPT | Mod: TC

## 2024-11-11 ENCOUNTER — PATIENT MESSAGE (OUTPATIENT)
Dept: OBSTETRICS AND GYNECOLOGY | Facility: CLINIC | Age: 34
End: 2024-11-11
Payer: COMMERCIAL

## 2024-11-19 ENCOUNTER — CLINICAL SUPPORT (OUTPATIENT)
Dept: OBSTETRICS AND GYNECOLOGY | Facility: CLINIC | Age: 34
End: 2024-11-19
Payer: COMMERCIAL

## 2024-11-19 ENCOUNTER — OFFICE VISIT (OUTPATIENT)
Dept: OBSTETRICS AND GYNECOLOGY | Facility: CLINIC | Age: 34
End: 2024-11-19
Payer: COMMERCIAL

## 2024-11-19 DIAGNOSIS — N91.2 AMENORRHEA: Primary | ICD-10-CM

## 2024-11-19 DIAGNOSIS — Z32.01 POSITIVE PREGNANCY TEST: Primary | ICD-10-CM

## 2024-11-19 PROCEDURE — 99499 UNLISTED E&M SERVICE: CPT | Mod: 95,,, | Performed by: STUDENT IN AN ORGANIZED HEALTH CARE EDUCATION/TRAINING PROGRAM

## 2024-11-19 PROCEDURE — 99999 PR PBB SHADOW E&M-EST. PATIENT-LVL II: CPT | Mod: PBBFAC,,,

## 2024-11-19 RX ORDER — PRENATAL NO.42/FOLIC ACID 1.4 MG
TABLET CHEW,IMMED AND DELAY REL,BIPHASE ORAL
COMMUNITY

## 2024-11-19 RX ORDER — BUPROPION HYDROCHLORIDE 300 MG/1
300 TABLET ORAL DAILY
COMMUNITY

## 2024-12-09 ENCOUNTER — OFFICE VISIT (OUTPATIENT)
Dept: OBSTETRICS AND GYNECOLOGY | Facility: CLINIC | Age: 34
End: 2024-12-09
Payer: COMMERCIAL

## 2024-12-09 ENCOUNTER — HOSPITAL ENCOUNTER (OUTPATIENT)
Dept: PERINATAL CARE | Facility: OTHER | Age: 34
Discharge: HOME | End: 2024-12-09
Attending: STUDENT IN AN ORGANIZED HEALTH CARE EDUCATION/TRAINING PROGRAM
Payer: COMMERCIAL

## 2024-12-09 VITALS
SYSTOLIC BLOOD PRESSURE: 104 MMHG | WEIGHT: 179.25 LBS | DIASTOLIC BLOOD PRESSURE: 62 MMHG | BODY MASS INDEX: 28.81 KG/M2 | HEIGHT: 66 IN

## 2024-12-09 DIAGNOSIS — Z32.01 POSITIVE PREGNANCY TEST: Primary | ICD-10-CM

## 2024-12-09 DIAGNOSIS — N91.4 SECONDARY OLIGOMENORRHEA: ICD-10-CM

## 2024-12-09 DIAGNOSIS — N91.2 AMENORRHEA: ICD-10-CM

## 2024-12-09 LAB
CREAT UR-MCNC: 52 MG/DL (ref 15–325)
PROT UR-MCNC: <7 MG/DL (ref 0–15)
PROT/CREAT UR: NORMAL MG/G{CREAT} (ref 0–0.2)

## 2024-12-09 PROCEDURE — 1159F MED LIST DOCD IN RCRD: CPT | Mod: CPTII,S$GLB,, | Performed by: FAMILY MEDICINE

## 2024-12-09 PROCEDURE — 3008F BODY MASS INDEX DOCD: CPT | Mod: CPTII,S$GLB,, | Performed by: FAMILY MEDICINE

## 2024-12-09 PROCEDURE — 3074F SYST BP LT 130 MM HG: CPT | Mod: CPTII,S$GLB,, | Performed by: FAMILY MEDICINE

## 2024-12-09 PROCEDURE — 99213 OFFICE O/P EST LOW 20 MIN: CPT | Mod: S$GLB,,, | Performed by: FAMILY MEDICINE

## 2024-12-09 PROCEDURE — 99999 PR PBB SHADOW E&M-EST. PATIENT-LVL III: CPT | Mod: PBBFAC,,, | Performed by: FAMILY MEDICINE

## 2024-12-09 PROCEDURE — 3078F DIAST BP <80 MM HG: CPT | Mod: CPTII,S$GLB,, | Performed by: FAMILY MEDICINE

## 2024-12-09 PROCEDURE — 87591 N.GONORRHOEAE DNA AMP PROB: CPT | Performed by: FAMILY MEDICINE

## 2024-12-09 PROCEDURE — 76801 OB US < 14 WKS SINGLE FETUS: CPT

## 2024-12-09 PROCEDURE — 82570 ASSAY OF URINE CREATININE: CPT | Performed by: FAMILY MEDICINE

## 2024-12-09 PROCEDURE — 1160F RVW MEDS BY RX/DR IN RCRD: CPT | Mod: CPTII,S$GLB,, | Performed by: FAMILY MEDICINE

## 2024-12-09 PROCEDURE — 87086 URINE CULTURE/COLONY COUNT: CPT | Performed by: FAMILY MEDICINE

## 2024-12-09 NOTE — PATIENT INSTRUCTIONS
LABOR AND DELIVERY PHONE NUMBER, [phone] (OPEN , LOCATED ON 6TH FLOOR OF HOSPITAL)  [address] PHONE NUMBER, [phone] (OPEN MON-FRI, 8a-5p)     1) Eat small frequent meals through the day versus three large meals  2) Try ginger ale or sprite to help settle the stomach - you can also take ginger capsules (250mg 4 times per day)  3) Eat crackers or dry toast before getting out of bed in the morning   4) Stay hydrated by drinking plenty of water-do not immediately eat or drink something after vomiting. Give your stomach a rest for 20-30 minutes. Slowly reintroduce ice chips, small sips water, crackers, etc.    5) Try OTC unisom (1/2 tablet) and vitamin B6 - take the 25mg b6 twice a day and then both together at night before bed. This can help with the nausea in the morning and is safe to use during pregnancy.  6) Sea bands (Accupressure wrist bands)    If you are unable to keep anything down and constantly vomiting for more that 24 hours, let the office know so that dehydration can be avoided. We would recommend being seen in the emergency department if this is the case.       Topic  General Pregnancy Information Recommended   (Unless Otherwise Contraindicated Or Restrictions Given To You By Your OB Doctor)      1. Anticipated course of prenatal care      Visits: will be Every 4 wks until 28 weeks, then every 2 weeks until 36 weeks, and then weekly until delivery.   Urine will be collected at each Obstetric visit        2. Nutrition and weight gain    Daily pre-monty vitamin (recommend taking at night)   Additional 300 calories needed daily  No Sushi, hotdogs, unpasteurized products (milk/cheeses). No large fish such as: shark, trudy mackerel, tile, sword fish   Incorporate 12 ounces of smaller seafood/week and no more than 6oz of albacore tuna   Caffeine: 200 mg/day or 2 cups of caffeine/day   Weight gain recommendations are based off of BMI before pregnancy. Generally patients who with normal weight prior  pregnancy it is recommended 25-35 pounds of weight gain during the pregnancy with an estimated weekly gain of 1 pound/wk in 2nd and 3rd Trimester.    3. Toxoplasmosis precautions  If cats are in the home avoid changing litter boxes and if you need to change the litter box recommended you use gloves   4. Sexual Activity  Sexual activity is okay unless you are put on restrictions by your provider. I recommend urinating after intercourse    5. Exercise  Generally pre-pregnancy routine is okay to continue   Drink plenty fluids for hydration   Stop any activity that causes heavy cramping like a period or bright red bleeding and contact your provider  No extreme or contact sports   No exercise on your back for an extended period of time after 20 weeks    6. Hot Tub/Saunas  Avoid hot tubes and saunas    7. Hair Treatments  Because of the lack of scientific studies on the effects of chemical treatments on your hair, we must advise that you do it at your own risk. If you choose to treat your hair, we recommend that you wait until after 12 weeks gestation. At this time there is no reason to believe that normal hair treatment is associated with onsequences to the baby.    8. Vaccines  Influenza vaccine is recommended by CDC during flu season   Tdap (pertussis or whopping cough) recommended each pregnancy between 27 and 36 weeks   Tdap booster recommended for family and other planned direct caregivers    9. Water  Water is an important nutrient in a good diet. However, it cannot be stressed enough that during pregnancy water is essential. The body has increased circulation through blood vessels, and without a large increase in water, pregnant women will be dehydrated. It plays an important role in decreasing constipation, preventing  contractions, decreasing swelling, and preventing dizziness. We recommend that you drink 8-10 glasses of water per day.    10. Smoking/Alcohol/Illicit Drug Use  No safe Level   Can lead to  problems with pregnancy   Growth of the developing fetus    labor (delivery before 37 weeks)    rupture of the membranes (water breaking before 37 weeks)   Premature separation of the placenta (which may cause bleeding)   American College of Obstetricians and Gynecologists endorses abstinence   Can lead to babies with disabilities    11. Environmental or work hazards  Unless otherwise restricted you may continue work throughout the pregnancy   Notify your provider of any work hazards or chemical exposure concerns   12. Travel    Safe to travel up to 35 weeks   Continue to wear a seatbelt and airbags are still recommended   Drink plenty fluids   Blood clots are a concern during pregnancy with long travel. Recommend compression stockings and moving around at least every 2 hours and staying hydrated.    13. Use of medications, vitamins, herbs, OTC drugs    Any medications not on the list provided to you from our clinic or given to you by one of our providers we recommend calling to make sure the medication is safe for you and baby.    14. Domestic Violence    Please notify office immediately of any concerns or violence so that we can help direct you to assistance needed   [place] [employer]: [phone]    15. Childbirth classes    List of Childbirth classes from [facility] is available    16. Selecting a Pediatrician  Selecting a pediatrician before delivery is recommended  You can interview pediatricians before delivery    17. Fetal Monitoring    A simple test of your babys well-being is a kick count. After 26 weeks, fetal motion of any kind should be monitored. Further discussion at that time   18.  Labor Signs    Water break, leaking fluids from Vagina prior 37 weeks  Regular contractions, Contractions that are more than 5-6/hour, getting stronger and painful with lower back pain, does not go away with rest and fluids    19. Postpartum Family Planning    Multiple  options available from short term methods to long term reversible and irreversible methods   Discuss with provider as you get closer to delivery    20. Dental    It is recommended that you get an annual dental cleaning    21. Breastfeeding    Classes offered at [facility] and it is recommended to take a class    22. Lifting In 2013, the National [place] for [employer]ational Safety and Health (NIOSH) published clinical guidelines for occupational lifting in uncomplicated pregnancies. The recommended weight limits are based on gestational age, intermittent versus repetitive lifting, time (hours/day) spent lifting, and lifting height from floor and distance in 3 front of body. In this guideline, the maximum permissible weight for a woman less than 20 weeks of gestation performing infrequent lifting is 36 pounds (16 kgs) and the maximum permissible weight at >=20 weeks is 26 pounds (12 kgs). For repetitive lifting >=1 hour/day, the maximum weights in the first and second half of pregnancy are 18 pounds (8 kgs) and 13 pounds (6 kgs), respectively, and for repetitive lifting <1 hour/day, the maximum weights are 30 pounds (14 kgs) and 22 pounds (10 kgs), respectively. Although not based on high quality evidence, these guidelines are a reasonable reference for counseling pregnant women     23. Scheduling and Provider Availability    Your Obstetric Doctor is usually here weekly but not every day. We recommend you make 3-4 advanced appointments at a time to accommodate your personal needs and work/school obligations.   We ask that you come 15 minutes prior your scheduled appointment.   For same day appointments (not routine appointments) there is a Nurse Practitioner or another obstetric provider available. Please let the  aware you are an OB patient requesting a same day appointment.      24. Recommended Phone [facility]      MEDICATIONS IN PREGNANCY     While some medications are considered safe to take  during pregnancy, the effects of other medications on your unborn baby are unknown. Therefore, it is very important to pay special attention to medications you take while you are pregnant.   If you were taking prescription medications before you became pregnant, please ask your health care provider about continuing these medications as soon as you find out that you are pregnant. Do not stop taking any medications without discussing with your health care provider. Your health care provider will weigh the benefits and risks to your pregnancy when making his or her recommendation. With some medications, the risk of not taking them may be more serious than the potential risk of taking them.   If you are prescribed any new medication, please inform your health care provider that you are pregnant. Be sure to discuss the risks and benefits of the newly prescribed medication with your health care provider before taking the medication. We are always available to answer any questions about new medications and how they relate to your pregnancy.     Are Alternative Pregnancy Medicine Therapies Safe?   Many pregnant women believe natural products can be safely used to relieve nausea, backache, and other annoying symptoms of pregnancy, but many of these so-called natural products have not been tested for their safety and effectiveness. Therefore, it is very important to check with your health care provider before taking any alternative therapies. She will not recommend a product or therapy until it is shown to be safe and effective.     Which Over the Counter Drugs Are Safe?   Prenatal vitamins, now available without a prescription, are safe and important to take during pregnancy. Ask your health care provider about the safety of taking other vitamins, herbal remedies and supplements during pregnancy. Most herbal preparations and supplements have not been proven to be safe during pregnancy. Generally, you should not take any  over-the-counter medication unless it is necessary. The following medications and home remedies have no known harmful effects during pregnancy when taken according to the package directions. If you want to know about the safety of any other medications not listed here, please contact your health care provider.      Problem:  Safe to Take:    Pain relief, headache, and fever  Acetaminophen - Tylenol, Anacin Aspirin-Free    Heartburn  Acid neutralizers - Maalox, Mylanta, Rolaids, Tums, Gaviscon   Histamine-blockers - Pepcid, Zantac, Prilosec    Gas pains and bloating  Simethicone - Gas-X, Maalox Anti-Gas, Mylanta Gas, Mylicon    Nausea  Meg - beverages, tablets, candies   Vitamin B6   Emetrol (if not diabetic)   Sea bands   Anti-histamines - Sleep-veronica, Benadryl, Bonnine, Dramamine    Cough  Guaifenesin (expectorant) - Hytuss, Mucinex, Robitussin   Dextromethorphan (antitussive) - Benylin, Delsym, Scot-Tussin DM   Guaifenesin plus dextromethorphan - Benylin Expectorant, Robitussin DM    Congestion  Pseudoephedrine - Sudafed, Actifed, Dristan, Neosynephrine   Vicks VapoRub   Saline nasal drops or spray    Sore throat  Throat lozenges - Sucrets, Cepacol, Cepastat, Ricola   Chloroseptic Spray   Warm salt/water gargle    Allergy relief  Chlorpheniramine - Chlor-Trimeton, Triaminic   Loratadine - Alavert, Claritin, Tavist ND, Triaminic Allerchews   Cetirizine - Zyrtec   Diphenhydramine -Benadryl, Diphenhist    Rashes  Hydrocortisone cream or ointment   Caladryl lotion or cream   Benadryl cream   Oatmeal bath (Aveeno)    Diarrhea  Loperamide - Imodium, Kaopectate, Maalox Anti-Diarrheal, Pepto Bismol    Constipation  Fiber supplements - Metamucil, Citrucel, Fiberall/Fibercon, Benefiber   Stool softeners - Colace, Senekot, Dulcolax   Milk of Magnesia    Hemorrhoids  Warm baths   Witch hazel preparations - Tucks medicated pads   Steroid preparations - Anusol-HC, Preparation H    Insomnia  Diphenhydramine - Benadryl, Unisom  SleepGels, Nytol, Sominex   Doxylamine succinate - Unisom Nighttime Sleep-Aid    First-aid ointments  Cortaid, Lanacort, Polysporin, Bacitracin, Neosporin    Yeast infections  Call office for appointment      Connected MOM   Using Wireless Technology to Manage Your Prenatal Health   Congratulations! Your team here at [facility] is excited for the upcoming addition to your family and is ready to support you over the course of your pregnancy. One of the ways we are prepared to help you is through our exciting new Digital Medicine Program, Connected MOM. Connected MOM stands for Connected Maternity Online Monitoring and is offered free of charge as a way to help our expectant mothers manage their pregnancy with fewer visits to the obstetrician.    In the fast-paced environment we live in, patients demand convenient, reliable access to healthcare at their fingertips. Recommended by The American College of Obstetricians and Gynecologists (ACOG), the standard prenatal care model for low-risk pregnancies can average anywhere between 12-14 in-office prenatal visits.    Through this innovative program, participating mothers-to-be receive a wireless scale, wireless blood pressure cuff and an at-home urine protein test kit. Through the use of a smartphone, patients can easily send their weight, blood pressure readings and urine protein test results digitally in real-time from the comfort of their own homes. Results are sent directly to their [facility] account, the systems online patient portal which connects directly to the patients Electronic Medical Record. A specialized Connected MOM care team - comprised of the patients obstetrician, a dedicated health  and a  - reviews the data and provides medical recommendations as needed. This allows expectant mothers to receive care proactively, wherever they are, while minimizing the need for in-person visits and thus minimizing  disruption to their regular daily activities.    How it Works At the initial prenatal visit, interested patients can work with their obstetrician to sign up for the program. After the appointment, expectant mothers can head to the [employer], a first-of-its-kind retail experience created by [facility] offering the latest in cutting-edge, interactive healthcare technology, to receive a Connected MOM kit containing all of the digital tools needed for remote monitoring. Once enrolled, patients weigh themselves regularly and take their blood pressure once a week. Instead of coming to three office appointments at weeks 20, 30, 37 the patient will have the appointment at home. They will take their blood pressure, weight and perform three at-home urine protein tests at these home visits. Results are directly transmitted into the EMR, and notifications and messages from the care team are communicated via [facility].     TECH SUPPORT [phone]  [email or web address]      Chromosomal testing  The sequential screen is a test done around 11-13 weeks that consists of an ultrasound that measures the nuchal fold (neck thickness) of baby and blood work on the same day. You get a second set of labs done a few weeks later after 15 weeks. Based on all three of these results, they are able to tell you if you are considered to be low risk or high risk regarding neural tube defects and chromosomal abnormalities like Down Syndrome. If you are at all interested, I usually place the order today so we do not miss the window. Someone will give you a phone call in a week or two to schedule this. If you do not want it, just tell them no thank you. This test is typically covered by your insurance and is performed by the Maternal Fetal Medicine (MFM) department here at [facility]. It will not tell you the sex of the baby.     OR     2.  Call [phone] to see about coverage and any out of pocket costs regarding the [id number] (MT21) testing.  This is done any day after 10 weeks and is blood work only. It checks for any chromosomal abnormalities like Down Syndrome. You can also find out the sex of the baby if you choose to know. Once you find out coverage and decide to proceed, send either myself or your doctor a message and we can see what date you can do it. It is done at our second floor lab at [facility].

## 2024-12-09 NOTE — PROGRESS NOTES
HISTORY OF PRESENT ILLNESS:    Soha Randhawa is a 34 y.o. female, ,  Patient's last menstrual period was 2024 (exact date).  for a routine exam complaining of amenorrhea and positive home UPT. Previous c section , child healthy. Same partner who is healthy. Mild nausea, no vb or pelvic pain. Hx of gential hsv. Hx of abn pap when 19yo, then had colpo. Pap  NL. She is a stay at home mom,  is a physician. This is the extent of the patient's complaints at this time.     Past Medical History:   Diagnosis Date    Chicken pox     Herpes simplex virus (HSV) infection     Major depressive disorder 2023    Nursing difficulty     Tobacco use 2020    Urinary tract infection        Past Surgical History:   Procedure Laterality Date     SECTION N/A 2020    Procedure:  SECTION;  Surgeon: Julie R. Jeansonne, MD;  Location: [facility];  Service: OB/GYN;  Laterality: N/A;     SECTION  2020       MEDICATIONS AND ALLERGIES:      Current Outpatient Medications:     acetylcysteine (N-ACETYL-L-CYSTEINE MISC), by Misc.(Non-Drug; Combo Route) route., Disp: , Rfl:     buPROPion (WELLBUTRIN XL) 300 MG 24 hr tablet, Take 300 mg by mouth once daily., Disp: , Rfl:     magnesium oxide-Mg AA chelate 300 mg Cap, , Disp: , Rfl:     omega-3 fatty acids/fish oil (FISH OIL-OMEGA-3 FATTY ACIDS) 300-1,000 mg capsule, Take by mouth once daily., Disp: , Rfl:     prenatal comb no.42-folic acid (PRENA1 CHEW) 1.4 mg chbp, Take by mouth., Disp: , Rfl:     Review of patient's allergies indicates:  No Known Allergies    Family History   Problem Relation Name Age of Onset    Diabetes Paternal Grandmother      Miscarriages / Stillbirths Maternal Grandmother      Heart disease Maternal Grandfather      Diabetes Father Vincent Brown     Cancer Father Vincent Brown         Lung CA smoker    Breast cancer Neg Hx      Colon cancer Neg Hx      Ovarian cancer Neg Hx      Uterine cancer  Neg Hx      Cervical cancer Neg Hx         Social History     Socioeconomic History    Marital status:    Tobacco Use    Smoking status: Former     Current packs/day: 0.00     Average packs/day: 0.5 packs/day for 17.3 years (8.7 ttl pk-yrs)     Types: Cigarettes, Vaping with nicotine     Start date: 2002     Quit date: 2019     Years since quittin.6    Smokeless tobacco: Never   Substance and Sexual Activity    Alcohol use: Not Currently    Drug use: Not Currently     Types: Marijuana    Sexual activity: Yes     Partners: Male     Birth control/protection: None     Social Drivers of Health     Financial Resource Strain: Low Risk  (2024)    Overall Financial Resource Strain (CARDIA)     Difficulty of Paying Living Expenses: Not hard at all   Food Insecurity: No Food Insecurity (2024)    Hunger Vital Sign     Worried About Running Out of Food in the Last Year: Never true     Ran Out of Food in the Last Year: Never true   Physical Activity: Sufficiently Active (2024)    Exercise Vital Sign     Days of Exercise per Week: 3 days     Minutes of Exercise per Session: 60 min   Stress: No Stress Concern Present (2024)    [employer] [place] [employer] - Occupational Stress Questionnaire     Feeling of Stress : Only a little   Housing Stability: Unknown (2024)    Housing Stability Vital Sign     Unable to Pay for Housing in the Last Year: No       COMPREHENSIVE GYN HISTORY:  PAP History: + abnormal Paps.  Infection History: + STDs. Denies PID.  Benign History: Denies uterine fibroids. Denies ovarian cysts. Denies endometriosis. Denies other conditions.  Cancer History: Denies cervical cancer. Denies uterine cancer or hyperplasia. Denies ovarian cancer. Denies vulvar cancer or pre-cancer. Denies vaginal cancer or pre-cancer. Denies breast cancer. Denies colon cancer.  Sexual Activity History: Reports currently being sexually active  Menstrual History: None.  Contraception:  "None    ROS:  GENERAL: No weight changes. No swelling. No fatigue. No fever.  CARDIOVASCULAR: No chest pain. No shortness of breath. No leg cramps.   NEUROLOGICAL: No headaches. No vision changes.  BREASTS: No pain. No lumps. No discharge.  ABDOMEN: No pain. + nausea. No vomiting. No diarrhea. No constipation.  REPRODUCTIVE: No abnormal bleeding. No pelvic pain  VULVA: No pain. No lesions. No itching.  VAGINA: No relaxation. No itching. No odor. No discharge. No lesions.  URINARY: No incontinence. No nocturia. No frequency. No dysuria.    /62 (BP Location: Left arm, Patient Position: Sitting)   Ht 5' 5.98" (1.676 m)   Wt 81.3 kg (179 lb 3.7 oz)   LMP 2024 (Exact Date)   BMI 28.95 kg/m²     PE:  Physical Exam:   Constitutional: She appears well-developed and well-nourished. She does not appear ill. No distress.        Pulmonary/Chest: Right breast exhibits no inverted nipple, no mass, no nipple discharge, no skin change, no tenderness and no swelling. Left breast exhibits no inverted nipple, no mass, no nipple discharge, no skin change, no tenderness and no swelling.          Genitourinary:    Urethra and vagina normal.      Pelvic exam was performed with patient in the lithotomy position.   The external female genitalia was normal.   Genitalia hair distrobution normal .   There is no rash or lesion on the right labia. There is no rash or lesion on the left labia. Cervix is normal. No rectocele, cystocele or prolapse of vaginal walls in the vagina. Uterus is not tender. Normal urethral meatus.              Neurological: GCS eye subscore is 4. GCS verbal subscore is 5. GCS motor subscore is 6.         PROCEDURES:  UPT Positive  Genprobe  Pap-  NL      DIAGNOSIS:  Gyn exam  IUP with stated LMP of Patient's last menstrual period was 2024 (exact date).    Indication   ========   Indication: Estimation of Gestational Age     History   ======   Previous Outcomes    2   Para 1   Risk Factors "   History risk factors: Hx      Method   ======   2D Color Doppler, [place] S8, Transabdominal and transvaginal ultrasound examination. View: Good view     Pregnancy   =========   Santamaria pregnancy. Number of embryos: 1     Dating   ======   LMP on: 10/8/2024   GA by LMP 8 w + 6 d   CORINE by LMP: 7/15/2025   Ultrasound examination on: 2024   GA by U/S based upon: CRL   GA by U/S 9 w + 1 d   CORINE by U/S: 2025   Assigned: based on ultrasound (CRL), selected on 2024   Assigned GA 9 w + 1 d   Assigned CORINE: 2025     Assessment   ==========   Gestational sac: visualized   Location: intrauterine   Yolk sac: visualized   Amniotic sac: visualized   Embryo: visualized   CRL 24.2 mm 9w 1d [place]   Cardiac activity: present    bpm     Maternal Structures   ===============   Uterus / Cervix   Uterus: Normal   Cervix: Visualized   Approach: Transvaginal   Ovaries / Tubes / Adnexa   Rt ovary: Normal   Rt ovary D1 38 mm   Rt ovary D2 31 mm   Rt ovary D3 27 mm   Rt ovary Vol 16.4 cmï¿½   Rt ovarian corpus luteum: visualized   Rt ovarian corpus luteum D1 24.0 mm   Rt ovarian corpus luteum D2 20.0 mm   Rt ovarian corpus luteum D3 23.0 mm   Lt ovary: Normal   Lt ovary D1 27 mm   Lt ovary D2 25 mm   Lt ovary D3 16 mm   Lt ovary Vol 5.7 cmï¿½   Cul de Sac / Bladder / Kidneys / Other   Free fluid: No free fluid visualized     Impression   =========   A santamaria IUP with cardiac activity is identified. CORINE is assigned based on the CRL from today?s study. If other clinical data, such as IVF conception dating or prior   ultrasound assignment of CORINE differs from the CORINE assigned today, please contact the [facility] department so that this report can be revised to reflect the correct CORINE.   The maternal adnexae are normal in appearance.     PLAN:Routine prenatal care    MEDICATIONS PRESCRIBED:  PNV    LABS AND TESTS ORDERED:  New Ob Labs      1st TRIMESTER COUNSELING: Discussed all, booklet provided  Common  complaints of pregnancy  HIV and other routine prenatal tests including  genetic screening  Risk factors identified by prenatal history  Anticipated course of prenatal care  Nutrition and weight gain counseling  Toxoplasmosis precautions (Cats/Raw Meat)  Sexual activity and exercise  Environmental/Work hazards  Travel  Tobacco (Ask, Advise, Assess, Assist, and Arrange), as well as alcohol and drug use  Use of any medications (Including supplements, Vitamins, Herbs, or OTC Drugs)  Indications for Ultrasound  Domestic violence  Seat belt use  Childbirth classes/Hospital facilities     TERATOLOGY COUNSELING: Discussed options for SS, MT21 and carrier screening. Pt will let us know her desires. Discussed time constraints on SS      FOLLOW-UP for a New Ob Visit in   4   weeks with   -discussed to call clinic or L&D/ER if after hours for pain/bleeding  -discussed otc meds/tx for NV she will let me know if rx needed

## 2024-12-11 LAB — BACTERIA UR CULT: NO GROWTH

## 2024-12-26 ENCOUNTER — PATIENT MESSAGE (OUTPATIENT)
Dept: OBSTETRICS AND GYNECOLOGY | Facility: CLINIC | Age: 34
End: 2024-12-26
Payer: COMMERCIAL

## 2024-12-26 DIAGNOSIS — Z34.01 ENCOUNTER FOR SUPERVISION OF NORMAL FIRST PREGNANCY IN FIRST TRIMESTER: Primary | ICD-10-CM

## 2024-12-30 ENCOUNTER — LAB VISIT (OUTPATIENT)
Dept: LAB | Facility: OTHER | Age: 34
End: 2024-12-30
Attending: FAMILY MEDICINE
Payer: COMMERCIAL

## 2024-12-30 DIAGNOSIS — Z34.01 ENCOUNTER FOR SUPERVISION OF NORMAL FIRST PREGNANCY IN FIRST TRIMESTER: ICD-10-CM

## 2024-12-30 PROCEDURE — 36415 COLL VENOUS BLD VENIPUNCTURE: CPT | Performed by: FAMILY MEDICINE

## 2025-01-06 ENCOUNTER — INITIAL PRENATAL (OUTPATIENT)
Dept: OBSTETRICS AND GYNECOLOGY | Facility: CLINIC | Age: 35
End: 2025-01-06
Payer: COMMERCIAL

## 2025-01-06 ENCOUNTER — PATIENT MESSAGE (OUTPATIENT)
Dept: OBSTETRICS AND GYNECOLOGY | Facility: CLINIC | Age: 35
End: 2025-01-06
Payer: COMMERCIAL

## 2025-01-06 ENCOUNTER — PATIENT MESSAGE (OUTPATIENT)
Dept: ADMINISTRATIVE | Facility: OTHER | Age: 35
End: 2025-01-06
Payer: COMMERCIAL

## 2025-01-06 VITALS — DIASTOLIC BLOOD PRESSURE: 63 MMHG | SYSTOLIC BLOOD PRESSURE: 115 MMHG | WEIGHT: 180.75 LBS | BODY MASS INDEX: 29.2 KG/M2

## 2025-01-06 DIAGNOSIS — Z34.90 ENCOUNTER FOR SUPERVISION OF NORMAL PREGNANCY, ANTEPARTUM, UNSPECIFIED GRAVIDITY: Primary | ICD-10-CM

## 2025-01-06 PROCEDURE — 0500F INITIAL PRENATAL CARE VISIT: CPT | Mod: CPTII,S$GLB,, | Performed by: STUDENT IN AN ORGANIZED HEALTH CARE EDUCATION/TRAINING PROGRAM

## 2025-01-06 PROCEDURE — 99999 PR PBB SHADOW E&M-EST. PATIENT-LVL III: CPT | Mod: PBBFAC,,, | Performed by: STUDENT IN AN ORGANIZED HEALTH CARE EDUCATION/TRAINING PROGRAM

## 2025-01-06 NOTE — PROGRESS NOTES
Pt is here for initial OB. Feeling well today.   Feeling well! No bleeding or pain.  Works as [employer]. 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  Relationship with partner living together. Safe at home. No cats.   Taking PNV   PMH: wellbutrin  PSH: no abdominal or pelvic surgeries  Prior pregnancies CS after trial of labor. Desires RLTCS.   Desires none for contraception  Wants to breastfeed  No family history of genetic disorders  No tobacco, EtOH or illicit drug use  Pap utd  H/o HSV  Flu vaccinated no        -Reviewed routine PNC  -Reviewed newmom, connected mom  -Reviewed initial labs, ultrasound  -Reviewed common pregnancy complaints and comfort measures for them  -Genetic testing pending  -ASA recommended to start after 12 weeks due to AMA  -RTC 4 weeks    Leah Johnson M.D.

## 2025-01-06 NOTE — PATIENT INSTRUCTIONS
[email or web address]    Nausea and vomiting  Vitamin B6 (pyridoxine) 50-100mg orally with Doxylamine (unisom) 12.5mg orally every 6-8 hours as needed for nausea. If the fatigue is too bad, you can try just the B6. Lastly, if taking it as needed is not helping, consider taking the two together on a scheduled basis.    Medications  Avoid NSAIDs (aleve, motrin, ibuprofen)  Use Tylenol or Acetaminophen for aches/pains, headaches  Begin a baby aspirin once daily (81mg) after 12 weeks  Pecid up to twice a day  GasX or simethicone for gas pains  Colace for constipation  Medications that are pregnancy category A, B or C are accepted as safe during pregnancy    Genetic Testing  [id number]: best test we have, most sensitive. Tells gender. Call Toll Free to get survey to decrease cost [phone]. website has a video about the test [employer]/videos  Sequential Screen: second best test includes ultrasound around 11 weeks and blood tests at 11 and 15 weeks  Quad screen: least sensitive for chromosomal issues, but would tell us about neural tube defects (such as spina bifida), blood work after 15 weeks   (Sequential or Quad do not tell sex of baby)    Caffiene  Less than 200mg per day    Exercise  Listen to your body  Don't stay with heart rate >140bpm    Weight Gain  -- Recommended weight gain of:          Underweight        Less than 18.5            28-40            Normal Weight     18.5-24.9                    25-35            Overweight          25-29.9                       15-25            Obese                  30 and greater             11-20      Covid  [email or web address]://[email or web address]/[email or web address]-health/faqs/[email or web address]-covid-19-[email or web address]-breastfeeding    https://[email or web address].org/womens-health/experts-and-stories/the-latest/rzn-dv-hl-know-the-covid-19-vaccines-are-safe-and-effective-one-expert-explains    [email or web address]/[email or web address]/[email or web address]-19-visitor-policy        Dear Soha Tamayo  Nasima,    Congratulations! Your team here at [facility] is excited for the upcoming addition to your family and is ready to support you over the course of your pregnancy. One of the ways we are prepared to help you is through our Connected MOM program.     Connected MOM is offered at no charge to help you manage your pregnancy by staying connected with your OB team through digitally connected devices. With Connected MOM, you will be able to digitally send weights and blood pressure readings to your provider and their team from the comfort of work or home without needing to schedule an appointment. Patients who participate in Connected MOM may be able to reduce the number of in-office appointments needed.     To participate, click here to complete the Connected Mom Program Consent questionnaire to start the enrollment process.    Once youve completed the questionnaire, you will be able to select how youd like to obtain your Connected Mom equipment - a digital blood pressure cuff and scale. These can be shipped directly to your home or picked up at an [facility].       If you have any questions about the program, please contact your OB provider or the Connected MOM support team at [phone].    Thank you,  The Connected MOM Team

## 2025-01-26 ENCOUNTER — PATIENT MESSAGE (OUTPATIENT)
Dept: OTHER | Facility: OTHER | Age: 35
End: 2025-01-26
Payer: COMMERCIAL

## 2025-02-07 ENCOUNTER — ROUTINE PRENATAL (OUTPATIENT)
Dept: OBSTETRICS AND GYNECOLOGY | Facility: CLINIC | Age: 35
End: 2025-02-07
Payer: COMMERCIAL

## 2025-02-07 ENCOUNTER — PATIENT MESSAGE (OUTPATIENT)
Dept: OBSTETRICS AND GYNECOLOGY | Facility: CLINIC | Age: 35
End: 2025-02-07

## 2025-02-07 VITALS — SYSTOLIC BLOOD PRESSURE: 90 MMHG | WEIGHT: 185.63 LBS | DIASTOLIC BLOOD PRESSURE: 60 MMHG | BODY MASS INDEX: 29.98 KG/M2

## 2025-02-07 DIAGNOSIS — Z34.90 ENCOUNTER FOR SUPERVISION OF NORMAL PREGNANCY, ANTEPARTUM, UNSPECIFIED GRAVIDITY: Primary | ICD-10-CM

## 2025-02-07 PROCEDURE — 99999 PR PBB SHADOW E&M-EST. PATIENT-LVL III: CPT | Mod: PBBFAC,,, | Performed by: STUDENT IN AN ORGANIZED HEALTH CARE EDUCATION/TRAINING PROGRAM

## 2025-02-07 PROCEDURE — 0502F SUBSEQUENT PRENATAL CARE: CPT | Mod: CPTII,S$GLB,, | Performed by: STUDENT IN AN ORGANIZED HEALTH CARE EDUCATION/TRAINING PROGRAM

## 2025-02-07 NOTE — PROGRESS NOTES
Pt doing well. Some headaches, resolve with rest. Some hypotensive episodes that resolve on their own.  Denies vaginal bleeding, N/V, headaches. Starting to feel quickening.  VS reviewed.  Ordered today: anatomy scan  Glucola instructions completed  KINZA info given  RTC: 4 weeks

## 2025-02-07 NOTE — PATIENT INSTRUCTIONS
KINZA, Labor and Delivery is on the 6th floor of [facility]: [phone]    [email or web address]

## 2025-02-23 ENCOUNTER — PATIENT MESSAGE (OUTPATIENT)
Dept: OTHER | Facility: OTHER | Age: 35
End: 2025-02-23
Payer: COMMERCIAL

## 2025-02-24 ENCOUNTER — PROCEDURE VISIT (OUTPATIENT)
Dept: MATERNAL FETAL MEDICINE | Facility: CLINIC | Age: 35
End: 2025-02-24
Payer: COMMERCIAL

## 2025-02-24 DIAGNOSIS — Z34.90 ENCOUNTER FOR SUPERVISION OF NORMAL PREGNANCY, ANTEPARTUM, UNSPECIFIED GRAVIDITY: ICD-10-CM

## 2025-02-24 DIAGNOSIS — Z36.2 ENCOUNTER FOR FOLLOW-UP ULTRASOUND OF FETAL ANATOMY: Primary | ICD-10-CM

## 2025-02-24 PROCEDURE — 76805 OB US >/= 14 WKS SNGL FETUS: CPT | Mod: S$GLB,,, | Performed by: STUDENT IN AN ORGANIZED HEALTH CARE EDUCATION/TRAINING PROGRAM

## 2025-03-23 ENCOUNTER — PATIENT MESSAGE (OUTPATIENT)
Dept: OTHER | Facility: OTHER | Age: 35
End: 2025-03-23
Payer: COMMERCIAL

## 2025-03-26 ENCOUNTER — RESULTS FOLLOW-UP (OUTPATIENT)
Dept: OBSTETRICS AND GYNECOLOGY | Facility: CLINIC | Age: 35
End: 2025-03-26

## 2025-03-26 ENCOUNTER — LAB VISIT (OUTPATIENT)
Dept: LAB | Facility: OTHER | Age: 35
End: 2025-03-26
Attending: STUDENT IN AN ORGANIZED HEALTH CARE EDUCATION/TRAINING PROGRAM
Payer: COMMERCIAL

## 2025-03-26 ENCOUNTER — ROUTINE PRENATAL (OUTPATIENT)
Dept: OBSTETRICS AND GYNECOLOGY | Facility: CLINIC | Age: 35
End: 2025-03-26
Payer: COMMERCIAL

## 2025-03-26 VITALS
SYSTOLIC BLOOD PRESSURE: 106 MMHG | BODY MASS INDEX: 31.58 KG/M2 | DIASTOLIC BLOOD PRESSURE: 64 MMHG | WEIGHT: 195.56 LBS

## 2025-03-26 DIAGNOSIS — Z34.90 ENCOUNTER FOR SUPERVISION OF NORMAL PREGNANCY, ANTEPARTUM, UNSPECIFIED GRAVIDITY: Primary | ICD-10-CM

## 2025-03-26 DIAGNOSIS — Z34.90 ENCOUNTER FOR SUPERVISION OF NORMAL PREGNANCY, ANTEPARTUM, UNSPECIFIED GRAVIDITY: ICD-10-CM

## 2025-03-26 LAB
ABSOLUTE EOSINOPHIL (OHS): 0.11 K/UL
ABSOLUTE MONOCYTE (OHS): 0.31 K/UL (ref 0.3–1)
ABSOLUTE NEUTROPHIL COUNT (OHS): 4.02 K/UL (ref 1.8–7.7)
BASOPHILS # BLD AUTO: 0.02 K/UL
BASOPHILS NFR BLD AUTO: 0.3 %
ERYTHROCYTE [DISTWIDTH] IN BLOOD BY AUTOMATED COUNT: 13.2 % (ref 11.5–14.5)
GLUCOSE SERPL-MCNC: 119 MG/DL (ref 70–140)
HCT VFR BLD AUTO: 33.6 % (ref 37–48.5)
HGB BLD-MCNC: 10.9 GM/DL (ref 12–16)
IMM GRANULOCYTES # BLD AUTO: 0.02 K/UL (ref 0–0.04)
IMM GRANULOCYTES NFR BLD AUTO: 0.3 % (ref 0–0.5)
LYMPHOCYTES # BLD AUTO: 1.41 K/UL (ref 1–4.8)
MCH RBC QN AUTO: 29.1 PG (ref 27–50)
MCHC RBC AUTO-ENTMCNC: 32.4 G/DL (ref 32–36)
MCV RBC AUTO: 90 FL (ref 82–98)
NUCLEATED RBC (/100WBC) (OHS): 0 /100 WBC
PLATELET # BLD AUTO: 212 K/UL (ref 150–450)
PMV BLD AUTO: 9.8 FL (ref 9.2–12.9)
RBC # BLD AUTO: 3.75 M/UL (ref 4–5.4)
RELATIVE EOSINOPHIL (OHS): 1.9 %
RELATIVE LYMPHOCYTE (OHS): 23.9 % (ref 18–48)
RELATIVE MONOCYTE (OHS): 5.3 % (ref 4–15)
RELATIVE NEUTROPHIL (OHS): 68.3 % (ref 38–73)
WBC # BLD AUTO: 5.89 K/UL (ref 3.9–12.7)

## 2025-03-26 PROCEDURE — 36415 COLL VENOUS BLD VENIPUNCTURE: CPT

## 2025-03-26 PROCEDURE — 85025 COMPLETE CBC W/AUTO DIFF WBC: CPT

## 2025-03-26 PROCEDURE — 0502F SUBSEQUENT PRENATAL CARE: CPT | Mod: CPTII,S$GLB,, | Performed by: STUDENT IN AN ORGANIZED HEALTH CARE EDUCATION/TRAINING PROGRAM

## 2025-03-26 PROCEDURE — 82950 GLUCOSE TEST: CPT

## 2025-03-26 PROCEDURE — 99999 PR PBB SHADOW E&M-EST. PATIENT-LVL III: CPT | Mod: PBBFAC,,, | Performed by: STUDENT IN AN ORGANIZED HEALTH CARE EDUCATION/TRAINING PROGRAM

## 2025-03-26 NOTE — PROGRESS NOTES
Pt doing well. Some cramping yesterday and today. Did have some diarrhea today. Denies vaginal bleeding, LOF, contractions. Reports regular fetal movement. Denies symptoms of pre E.  VS reviewed.  Repeat anatomy scheduled  Glucola underway  Tdap recs reviewed. Pt to consider  Encouraged connected mom use  Pump rx info on AVS  Labor and pre E precautions reviewed.   RTC: 4 weeks

## 2025-03-26 NOTE — PATIENT INSTRUCTIONS
[email or web address]    Tdap or Dtap for close family if not had in the past five years    KIN[facility], Labor and Delivery is on the 6th floor of [facility]: [phone]    [email or web address]      Blood pressures to look out for:  Top number >140 OR bottom number>90  If elevated, wait 10-15minutes and then repeat  If still elevated, reach out to Doctor.  If top number >160 OR bottom >110, repeat  If still that high, proceed straight to the KINZA

## 2025-03-31 ENCOUNTER — PROCEDURE VISIT (OUTPATIENT)
Dept: MATERNAL FETAL MEDICINE | Facility: CLINIC | Age: 35
End: 2025-03-31
Payer: COMMERCIAL

## 2025-03-31 DIAGNOSIS — Z36.2 ENCOUNTER FOR FOLLOW-UP ULTRASOUND OF FETAL ANATOMY: ICD-10-CM

## 2025-03-31 PROCEDURE — 76816 OB US FOLLOW-UP PER FETUS: CPT | Mod: S$GLB,,, | Performed by: STUDENT IN AN ORGANIZED HEALTH CARE EDUCATION/TRAINING PROGRAM

## 2025-04-06 ENCOUNTER — PATIENT MESSAGE (OUTPATIENT)
Dept: OTHER | Facility: OTHER | Age: 35
End: 2025-04-06
Payer: COMMERCIAL

## 2025-04-15 ENCOUNTER — PATIENT MESSAGE (OUTPATIENT)
Dept: OBSTETRICS AND GYNECOLOGY | Facility: CLINIC | Age: 35
End: 2025-04-15
Payer: COMMERCIAL

## 2025-04-20 ENCOUNTER — PATIENT MESSAGE (OUTPATIENT)
Dept: OTHER | Facility: OTHER | Age: 35
End: 2025-04-20
Payer: COMMERCIAL

## 2025-04-23 ENCOUNTER — PATIENT MESSAGE (OUTPATIENT)
Dept: OBSTETRICS AND GYNECOLOGY | Facility: CLINIC | Age: 35
End: 2025-04-23

## 2025-04-23 ENCOUNTER — ROUTINE PRENATAL (OUTPATIENT)
Dept: OBSTETRICS AND GYNECOLOGY | Facility: CLINIC | Age: 35
End: 2025-04-23
Payer: COMMERCIAL

## 2025-04-23 VITALS
WEIGHT: 203.06 LBS | BODY MASS INDEX: 32.79 KG/M2 | SYSTOLIC BLOOD PRESSURE: 118 MMHG | DIASTOLIC BLOOD PRESSURE: 62 MMHG

## 2025-04-23 DIAGNOSIS — Z34.90 ENCOUNTER FOR SUPERVISION OF NORMAL PREGNANCY, ANTEPARTUM, UNSPECIFIED GRAVIDITY: Primary | ICD-10-CM

## 2025-04-23 PROCEDURE — 99999 PR PBB SHADOW E&M-EST. PATIENT-LVL III: CPT | Mod: PBBFAC,,, | Performed by: STUDENT IN AN ORGANIZED HEALTH CARE EDUCATION/TRAINING PROGRAM

## 2025-04-23 PROCEDURE — 0502F SUBSEQUENT PRENATAL CARE: CPT | Mod: CPTII,S$GLB,, | Performed by: STUDENT IN AN ORGANIZED HEALTH CARE EDUCATION/TRAINING PROGRAM

## 2025-04-23 NOTE — PROGRESS NOTES
Pt doing well. Denies vaginal bleeding, LOF, contractions. Reports regular fetal movement. Denies symptoms of pre E. Headaches at times.   VS reviewed.  Connected mom encouraged  Tdap declined  RSV recs reviewed: declined  Labor and pre E precautions reviewed.   RTC: 4 weeks

## 2025-05-04 ENCOUNTER — PATIENT MESSAGE (OUTPATIENT)
Dept: OTHER | Facility: OTHER | Age: 35
End: 2025-05-04
Payer: COMMERCIAL

## 2025-05-15 ENCOUNTER — TELEPHONE (OUTPATIENT)
Dept: OBSTETRICS AND GYNECOLOGY | Facility: CLINIC | Age: 35
End: 2025-05-15
Payer: COMMERCIAL

## 2025-05-18 ENCOUNTER — PATIENT MESSAGE (OUTPATIENT)
Dept: OTHER | Facility: OTHER | Age: 35
End: 2025-05-18
Payer: COMMERCIAL

## 2025-05-22 ENCOUNTER — ROUTINE PRENATAL (OUTPATIENT)
Dept: OBSTETRICS AND GYNECOLOGY | Facility: CLINIC | Age: 35
End: 2025-05-22
Payer: COMMERCIAL

## 2025-05-22 VITALS
WEIGHT: 211.19 LBS | BODY MASS INDEX: 34.11 KG/M2 | DIASTOLIC BLOOD PRESSURE: 60 MMHG | SYSTOLIC BLOOD PRESSURE: 120 MMHG

## 2025-05-22 DIAGNOSIS — Z34.90 ENCOUNTER FOR SUPERVISION OF NORMAL PREGNANCY, ANTEPARTUM, UNSPECIFIED GRAVIDITY: Primary | ICD-10-CM

## 2025-05-22 DIAGNOSIS — O99.213 OBESITY AFFECTING PREGNANCY IN THIRD TRIMESTER, UNSPECIFIED OBESITY TYPE: ICD-10-CM

## 2025-05-22 PROCEDURE — 99999 PR PBB SHADOW E&M-EST. PATIENT-LVL III: CPT | Mod: PBBFAC,,, | Performed by: STUDENT IN AN ORGANIZED HEALTH CARE EDUCATION/TRAINING PROGRAM

## 2025-05-22 NOTE — PROGRESS NOTES
Pt doing well. Hemorrhoids. Steroid cream helping. Normal bowels. Denies vaginal bleeding, LOF, contractions. Reports regular fetal movement. Denies symptoms of pre E.  VS reviewed.  Ordered today: growth for obesity  Declines RSV vax  Labor and pre E precautions reviewed.   RTC: 2 weeks

## 2025-05-26 ENCOUNTER — PROCEDURE VISIT (OUTPATIENT)
Dept: MATERNAL FETAL MEDICINE | Facility: CLINIC | Age: 35
End: 2025-05-26
Payer: COMMERCIAL

## 2025-05-26 DIAGNOSIS — O99.213 OBESITY AFFECTING PREGNANCY IN THIRD TRIMESTER, UNSPECIFIED OBESITY TYPE: ICD-10-CM

## 2025-05-26 DIAGNOSIS — Z34.90 ENCOUNTER FOR SUPERVISION OF NORMAL PREGNANCY, ANTEPARTUM, UNSPECIFIED GRAVIDITY: ICD-10-CM

## 2025-05-26 PROCEDURE — 76816 OB US FOLLOW-UP PER FETUS: CPT | Mod: S$GLB,,, | Performed by: OBSTETRICS & GYNECOLOGY

## 2025-06-02 ENCOUNTER — OFFICE VISIT (OUTPATIENT)
Dept: OBSTETRICS AND GYNECOLOGY | Facility: CLINIC | Age: 35
End: 2025-06-02
Payer: COMMERCIAL

## 2025-06-02 DIAGNOSIS — Z34.90 ENCOUNTER FOR SUPERVISION OF NORMAL PREGNANCY, ANTEPARTUM, UNSPECIFIED GRAVIDITY: Primary | ICD-10-CM

## 2025-06-02 PROCEDURE — 0502F SUBSEQUENT PRENATAL CARE: CPT | Mod: CPTII,95,, | Performed by: STUDENT IN AN ORGANIZED HEALTH CARE EDUCATION/TRAINING PROGRAM

## 2025-06-08 ENCOUNTER — PATIENT MESSAGE (OUTPATIENT)
Dept: OTHER | Facility: OTHER | Age: 35
End: 2025-06-08
Payer: COMMERCIAL

## 2025-06-09 ENCOUNTER — PATIENT MESSAGE (OUTPATIENT)
Dept: OBSTETRICS AND GYNECOLOGY | Facility: CLINIC | Age: 35
End: 2025-06-09
Payer: COMMERCIAL

## 2025-06-17 ENCOUNTER — ROUTINE PRENATAL (OUTPATIENT)
Dept: OBSTETRICS AND GYNECOLOGY | Facility: CLINIC | Age: 35
End: 2025-06-17
Payer: COMMERCIAL

## 2025-06-17 ENCOUNTER — LAB VISIT (OUTPATIENT)
Dept: LAB | Facility: OTHER | Age: 35
End: 2025-06-17
Attending: STUDENT IN AN ORGANIZED HEALTH CARE EDUCATION/TRAINING PROGRAM
Payer: COMMERCIAL

## 2025-06-17 VITALS — DIASTOLIC BLOOD PRESSURE: 69 MMHG | SYSTOLIC BLOOD PRESSURE: 109 MMHG | BODY MASS INDEX: 35 KG/M2 | WEIGHT: 216.69 LBS

## 2025-06-17 DIAGNOSIS — Z34.90 ENCOUNTER FOR SUPERVISION OF NORMAL PREGNANCY, ANTEPARTUM, UNSPECIFIED GRAVIDITY: Primary | ICD-10-CM

## 2025-06-17 DIAGNOSIS — Z34.90 ENCOUNTER FOR SUPERVISION OF NORMAL PREGNANCY, ANTEPARTUM, UNSPECIFIED GRAVIDITY: ICD-10-CM

## 2025-06-17 LAB
ABSOLUTE EOSINOPHIL (OHS): 0.06 K/UL
ABSOLUTE MONOCYTE (OHS): 0.32 K/UL (ref 0.3–1)
ABSOLUTE NEUTROPHIL COUNT (OHS): 3.44 K/UL (ref 1.8–7.7)
BASOPHILS # BLD AUTO: 0.01 K/UL
BASOPHILS NFR BLD AUTO: 0.2 %
ERYTHROCYTE [DISTWIDTH] IN BLOOD BY AUTOMATED COUNT: 13.8 % (ref 11.5–14.5)
HCT VFR BLD AUTO: 34.9 % (ref 37–48.5)
HGB BLD-MCNC: 11.8 GM/DL (ref 12–16)
HIV 1+2 AB+HIV1 P24 AG SERPL QL IA: NEGATIVE
IMM GRANULOCYTES # BLD AUTO: 0.02 K/UL (ref 0–0.04)
IMM GRANULOCYTES NFR BLD AUTO: 0.4 % (ref 0–0.5)
LYMPHOCYTES # BLD AUTO: 1.37 K/UL (ref 1–4.8)
MCH RBC QN AUTO: 29.9 PG (ref 27–31)
MCHC RBC AUTO-ENTMCNC: 33.8 G/DL (ref 32–36)
MCV RBC AUTO: 89 FL (ref 82–98)
NUCLEATED RBC (/100WBC) (OHS): 0 /100 WBC
PLATELET # BLD AUTO: 212 K/UL (ref 150–450)
PMV BLD AUTO: 9.9 FL (ref 9.2–12.9)
RBC # BLD AUTO: 3.94 M/UL (ref 4–5.4)
RELATIVE EOSINOPHIL (OHS): 1.1 %
RELATIVE LYMPHOCYTE (OHS): 26.2 % (ref 18–48)
RELATIVE MONOCYTE (OHS): 6.1 % (ref 4–15)
RELATIVE NEUTROPHIL (OHS): 66 % (ref 38–73)
T PALLIDUM IGG+IGM SER QL: NEGATIVE
WBC # BLD AUTO: 5.22 K/UL (ref 3.9–12.7)

## 2025-06-17 PROCEDURE — 87081 CULTURE SCREEN ONLY: CPT | Performed by: STUDENT IN AN ORGANIZED HEALTH CARE EDUCATION/TRAINING PROGRAM

## 2025-06-17 PROCEDURE — 86593 SYPHILIS TEST NON-TREP QUANT: CPT

## 2025-06-17 PROCEDURE — 36415 COLL VENOUS BLD VENIPUNCTURE: CPT

## 2025-06-17 PROCEDURE — 85025 COMPLETE CBC W/AUTO DIFF WBC: CPT

## 2025-06-17 PROCEDURE — 87389 HIV-1 AG W/HIV-1&-2 AB AG IA: CPT

## 2025-06-17 PROCEDURE — 99999 PR PBB SHADOW E&M-EST. PATIENT-LVL III: CPT | Mod: PBBFAC,,, | Performed by: STUDENT IN AN ORGANIZED HEALTH CARE EDUCATION/TRAINING PROGRAM

## 2025-06-17 PROCEDURE — 0502F SUBSEQUENT PRENATAL CARE: CPT | Mod: CPTII,S$GLB,, | Performed by: STUDENT IN AN ORGANIZED HEALTH CARE EDUCATION/TRAINING PROGRAM

## 2025-06-17 RX ORDER — VALACYCLOVIR HYDROCHLORIDE 500 MG/1
500 TABLET, FILM COATED ORAL 2 TIMES DAILY
Qty: 180 TABLET | Refills: 3 | Status: SHIPPED | OUTPATIENT
Start: 2025-06-17 | End: 2026-06-17

## 2025-06-17 NOTE — PATIENT INSTRUCTIONS
[facility]IN[facility], Labor and Delivery is on the 6th floor of [facility]: [phone]    [email or web address]

## 2025-06-17 NOTE — PROGRESS NOTES
Pt doing well. No complaints  Denies vaginal bleeding, LOF, contractions. Reports regular fetal movement. Denies symptoms of pre E.  Valtrex started   Ordered today: GBS, 3 T labs  Consents reviewed and signed  Pre op instructions, soap and telogen papers given  Labor and pre E precautions reviewed.   RTC: 2 weeks

## 2025-06-20 LAB — BACTERIA SPEC AEROBE CULT: NORMAL

## 2025-06-30 ENCOUNTER — ROUTINE PRENATAL (OUTPATIENT)
Dept: OBSTETRICS AND GYNECOLOGY | Facility: CLINIC | Age: 35
End: 2025-06-30
Payer: COMMERCIAL

## 2025-06-30 ENCOUNTER — RESULTS FOLLOW-UP (OUTPATIENT)
Dept: OBSTETRICS AND GYNECOLOGY | Facility: CLINIC | Age: 35
End: 2025-06-30

## 2025-06-30 VITALS — DIASTOLIC BLOOD PRESSURE: 76 MMHG | BODY MASS INDEX: 35.85 KG/M2 | SYSTOLIC BLOOD PRESSURE: 130 MMHG | WEIGHT: 222 LBS

## 2025-06-30 DIAGNOSIS — Z3A.38 38 WEEKS GESTATION OF PREGNANCY: Primary | ICD-10-CM

## 2025-06-30 PROCEDURE — 0502F SUBSEQUENT PRENATAL CARE: CPT | Mod: CPTII,S$GLB,, | Performed by: NURSE PRACTITIONER

## 2025-06-30 PROCEDURE — 99999 PR PBB SHADOW E&M-EST. PATIENT-LVL III: CPT | Mod: PBBFAC,,, | Performed by: NURSE PRACTITIONER

## 2025-06-30 NOTE — PROGRESS NOTES
Here for routine OB appt at 38w1d, with no complaints.  Reports good FM.  Denies LOF, denies VB, denies contractions.  Reviewed warning signs of Labor and Preeclampsia.  Daily FM counts reinforced.  GBS neg  RCS 7/7/25  PP visit already scheduled  Infant vertex  Reviewed instructions for CS, already given Hibiclens. Reviewed NPO instructions.

## 2025-06-30 NOTE — PATIENT INSTRUCTIONS
[employer]    Tdap or Dtap for close family if not had in the past five years    KINZA, Labor and Delivery is on the 6th floor of [facility]: [phone]    [address] PHONE NUMBER, [phone] (OPEN MON-FRI, 8a-5p)    [email or web address]    Blood pressures to look out for:  Top number >140 OR bottom number>90  If elevated, wait 10-15minutes and then repeat  If still elevated, reach out to Doctor.  If top number >160 OR bottom >110, repeat  If still that high, proceed straight to the KINZA

## 2025-07-07 ENCOUNTER — HOSPITAL ENCOUNTER (INPATIENT)
Facility: OTHER | Age: 35
LOS: 3 days | Discharge: HOME | End: 2025-07-10
Attending: STUDENT IN AN ORGANIZED HEALTH CARE EDUCATION/TRAINING PROGRAM | Admitting: STUDENT IN AN ORGANIZED HEALTH CARE EDUCATION/TRAINING PROGRAM
Payer: COMMERCIAL

## 2025-07-07 ENCOUNTER — ANESTHESIA EVENT (OUTPATIENT)
Dept: OBSTETRICS AND GYNECOLOGY | Facility: OTHER | Age: 35
End: 2025-07-07
Payer: COMMERCIAL

## 2025-07-07 ENCOUNTER — ANESTHESIA (OUTPATIENT)
Dept: OBSTETRICS AND GYNECOLOGY | Facility: OTHER | Age: 35
End: 2025-07-07
Payer: COMMERCIAL

## 2025-07-07 DIAGNOSIS — Z98.891 S/P CESAREAN SECTION: Primary | ICD-10-CM

## 2025-07-07 DIAGNOSIS — Z98.891 HISTORY OF CESAREAN SECTION: ICD-10-CM

## 2025-07-07 LAB
ABSOLUTE EOSINOPHIL (OHS): 0.08 K/UL
ABSOLUTE MONOCYTE (OHS): 0.4 K/UL (ref 0.3–1)
ABSOLUTE NEUTROPHIL COUNT (OHS): 3.86 K/UL (ref 1.8–7.7)
BASOPHILS # BLD AUTO: 0.02 K/UL
BASOPHILS NFR BLD AUTO: 0.3 %
ERYTHROCYTE [DISTWIDTH] IN BLOOD BY AUTOMATED COUNT: 13.9 % (ref 11.5–14.5)
GROUP & RH: NORMAL
HCT VFR BLD AUTO: 34.6 % (ref 37–48.5)
HGB BLD-MCNC: 11.7 GM/DL (ref 12–16)
IMM GRANULOCYTES # BLD AUTO: 0.02 K/UL (ref 0–0.04)
IMM GRANULOCYTES NFR BLD AUTO: 0.3 % (ref 0–0.5)
INDIRECT COOMBS: NORMAL
LYMPHOCYTES # BLD AUTO: 1.8 K/UL (ref 1–4.8)
MCH RBC QN AUTO: 29.8 PG (ref 27–31)
MCHC RBC AUTO-ENTMCNC: 33.8 G/DL (ref 32–36)
MCV RBC AUTO: 88 FL (ref 82–98)
NUCLEATED RBC (/100WBC) (OHS): 0 /100 WBC
PLATELET # BLD AUTO: 204 K/UL (ref 150–450)
PMV BLD AUTO: 9.9 FL (ref 9.2–12.9)
RBC # BLD AUTO: 3.92 M/UL (ref 4–5.4)
RELATIVE EOSINOPHIL (OHS): 1.3 %
RELATIVE LYMPHOCYTE (OHS): 29.1 % (ref 18–48)
RELATIVE MONOCYTE (OHS): 6.5 % (ref 4–15)
RELATIVE NEUTROPHIL (OHS): 62.5 % (ref 38–73)
T PALLIDUM IGG+IGM SER QL: NEGATIVE
WBC # BLD AUTO: 6.18 K/UL (ref 3.9–12.7)

## 2025-07-07 PROCEDURE — 25000003 PHARM REV CODE 250

## 2025-07-07 PROCEDURE — 85025 COMPLETE CBC W/AUTO DIFF WBC: CPT | Performed by: STUDENT IN AN ORGANIZED HEALTH CARE EDUCATION/TRAINING PROGRAM

## 2025-07-07 PROCEDURE — 71000039 HC RECOVERY, EACH ADD'L HOUR: Performed by: STUDENT IN AN ORGANIZED HEALTH CARE EDUCATION/TRAINING PROGRAM

## 2025-07-07 PROCEDURE — 86593 SYPHILIS TEST NON-TREP QUANT: CPT | Performed by: STUDENT IN AN ORGANIZED HEALTH CARE EDUCATION/TRAINING PROGRAM

## 2025-07-07 PROCEDURE — 51702 INSERT TEMP BLADDER CATH: CPT

## 2025-07-07 PROCEDURE — 63600175 PHARM REV CODE 636 W HCPCS

## 2025-07-07 PROCEDURE — 37000009 HC ANESTHESIA EA ADD 15 MINS: Performed by: STUDENT IN AN ORGANIZED HEALTH CARE EDUCATION/TRAINING PROGRAM

## 2025-07-07 PROCEDURE — 71000033 HC RECOVERY, INTIAL HOUR: Performed by: STUDENT IN AN ORGANIZED HEALTH CARE EDUCATION/TRAINING PROGRAM

## 2025-07-07 PROCEDURE — 11000001 HC ACUTE MED/SURG PRIVATE ROOM

## 2025-07-07 PROCEDURE — 36004725 HC OB OR TIME LEV III - EA ADD 15 MIN: Performed by: STUDENT IN AN ORGANIZED HEALTH CARE EDUCATION/TRAINING PROGRAM

## 2025-07-07 PROCEDURE — 37000008 HC ANESTHESIA 1ST 15 MINUTES: Performed by: STUDENT IN AN ORGANIZED HEALTH CARE EDUCATION/TRAINING PROGRAM

## 2025-07-07 PROCEDURE — 59510 CESAREAN DELIVERY: CPT | Mod: GB,,, | Performed by: STUDENT IN AN ORGANIZED HEALTH CARE EDUCATION/TRAINING PROGRAM

## 2025-07-07 PROCEDURE — 36004724 HC OB OR TIME LEV III - 1ST 15 MIN: Performed by: STUDENT IN AN ORGANIZED HEALTH CARE EDUCATION/TRAINING PROGRAM

## 2025-07-07 PROCEDURE — 86901 BLOOD TYPING SEROLOGIC RH(D): CPT | Performed by: STUDENT IN AN ORGANIZED HEALTH CARE EDUCATION/TRAINING PROGRAM

## 2025-07-07 PROCEDURE — 63600175 PHARM REV CODE 636 W HCPCS: Mod: JZ,TB

## 2025-07-07 RX ORDER — DIPHENHYDRAMINE HYDROCHLORIDE 50 MG/ML
12.5 INJECTION, SOLUTION INTRAMUSCULAR; INTRAVENOUS
Status: DISCONTINUED | OUTPATIENT
Start: 2025-07-07 | End: 2025-07-10 | Stop reason: HOSPADM

## 2025-07-07 RX ORDER — IBUPROFEN 400 MG/1
800 TABLET, FILM COATED ORAL
Status: DISCONTINUED | OUTPATIENT
Start: 2025-07-08 | End: 2025-07-10 | Stop reason: HOSPADM

## 2025-07-07 RX ORDER — PROCHLORPERAZINE EDISYLATE 5 MG/ML
5 INJECTION INTRAMUSCULAR; INTRAVENOUS EVERY 6 HOURS PRN
Status: DISCONTINUED | OUTPATIENT
Start: 2025-07-07 | End: 2025-07-10 | Stop reason: HOSPADM

## 2025-07-07 RX ORDER — NALBUPHINE HYDROCHLORIDE 10 MG/ML
5 INJECTION INTRAMUSCULAR; INTRAVENOUS; SUBCUTANEOUS ONCE AS NEEDED
Status: DISCONTINUED | OUTPATIENT
Start: 2025-07-07 | End: 2025-07-10 | Stop reason: HOSPADM

## 2025-07-07 RX ORDER — ACETAMINOPHEN 325 MG/1
650 TABLET ORAL
Status: DISCONTINUED | OUTPATIENT
Start: 2025-07-07 | End: 2025-07-10 | Stop reason: HOSPADM

## 2025-07-07 RX ORDER — ONDANSETRON HYDROCHLORIDE 2 MG/ML
4 INJECTION, SOLUTION INTRAVENOUS EVERY 6 HOURS PRN
Status: DISCONTINUED | OUTPATIENT
Start: 2025-07-07 | End: 2025-07-07

## 2025-07-07 RX ORDER — DIPHENHYDRAMINE HCL 25 MG
25 CAPSULE ORAL EVERY 6 HOURS PRN
Status: DISCONTINUED | OUTPATIENT
Start: 2025-07-07 | End: 2025-07-10 | Stop reason: HOSPADM

## 2025-07-07 RX ORDER — MISOPROSTOL 200 UG/1
800 TABLET ORAL ONCE AS NEEDED
Status: DISCONTINUED | OUTPATIENT
Start: 2025-07-07 | End: 2025-07-07

## 2025-07-07 RX ORDER — PROCHLORPERAZINE EDISYLATE 5 MG/ML
5 INJECTION INTRAMUSCULAR; INTRAVENOUS EVERY 6 HOURS PRN
Status: DISCONTINUED | OUTPATIENT
Start: 2025-07-07 | End: 2025-07-07

## 2025-07-07 RX ORDER — OXYCODONE HYDROCHLORIDE 10 MG/1
10 TABLET ORAL EVERY 4 HOURS PRN
Status: DISCONTINUED | OUTPATIENT
Start: 2025-07-07 | End: 2025-07-07

## 2025-07-07 RX ORDER — BUPROPION HYDROCHLORIDE 150 MG/1
300 TABLET ORAL DAILY
Status: DISCONTINUED | OUTPATIENT
Start: 2025-07-07 | End: 2025-07-10 | Stop reason: HOSPADM

## 2025-07-07 RX ORDER — AMOXICILLIN 250 MG
1 CAPSULE ORAL NIGHTLY PRN
Status: DISCONTINUED | OUTPATIENT
Start: 2025-07-07 | End: 2025-07-10 | Stop reason: HOSPADM

## 2025-07-07 RX ORDER — ACETAMINOPHEN 325 MG/1
650 TABLET ORAL EVERY 6 HOURS
Status: DISCONTINUED | OUTPATIENT
Start: 2025-07-07 | End: 2025-07-07

## 2025-07-07 RX ORDER — SODIUM CHLORIDE, SODIUM LACTATE, POTASSIUM CHLORIDE, CALCIUM CHLORIDE 600; 310; 30; 20 MG/100ML; MG/100ML; MG/100ML; MG/100ML
INJECTION, SOLUTION INTRAVENOUS CONTINUOUS
Status: DISCONTINUED | OUTPATIENT
Start: 2025-07-07 | End: 2025-07-07

## 2025-07-07 RX ORDER — ONDANSETRON 8 MG/1
8 TABLET, ORALLY DISINTEGRATING ORAL EVERY 8 HOURS PRN
Status: DISCONTINUED | OUTPATIENT
Start: 2025-07-08 | End: 2025-07-10 | Stop reason: HOSPADM

## 2025-07-07 RX ORDER — ONDANSETRON HYDROCHLORIDE 2 MG/ML
4 INJECTION, SOLUTION INTRAVENOUS EVERY 6 HOURS PRN
Status: ACTIVE | OUTPATIENT
Start: 2025-07-07 | End: 2025-07-08

## 2025-07-07 RX ORDER — KETOROLAC TROMETHAMINE 30 MG/ML
30 INJECTION, SOLUTION INTRAMUSCULAR; INTRAVENOUS
Status: COMPLETED | OUTPATIENT
Start: 2025-07-07 | End: 2025-07-08

## 2025-07-07 RX ORDER — MISOPROSTOL 200 UG/1
800 TABLET ORAL ONCE AS NEEDED
Status: DISCONTINUED | OUTPATIENT
Start: 2025-07-07 | End: 2025-07-10 | Stop reason: HOSPADM

## 2025-07-07 RX ORDER — CARBOPROST TROMETHAMINE 250 UG/ML
250 INJECTION, SOLUTION INTRAMUSCULAR
Status: DISCONTINUED | OUTPATIENT
Start: 2025-07-07 | End: 2025-07-10 | Stop reason: HOSPADM

## 2025-07-07 RX ORDER — ONDANSETRON HYDROCHLORIDE 2 MG/ML
INJECTION, SOLUTION INTRAVENOUS
Status: DISCONTINUED | OUTPATIENT
Start: 2025-07-07 | End: 2025-07-07

## 2025-07-07 RX ORDER — SODIUM CHLORIDE, SODIUM LACTATE, POTASSIUM CHLORIDE, CALCIUM CHLORIDE 600; 310; 30; 20 MG/100ML; MG/100ML; MG/100ML; MG/100ML
INJECTION, SOLUTION INTRAVENOUS CONTINUOUS
Status: DISCONTINUED | OUTPATIENT
Start: 2025-07-07 | End: 2025-07-09

## 2025-07-07 RX ORDER — CEFAZOLIN SODIUM 1 G/3ML
INJECTION, POWDER, FOR SOLUTION INTRAMUSCULAR; INTRAVENOUS
Status: DISCONTINUED | OUTPATIENT
Start: 2025-07-07 | End: 2025-07-07

## 2025-07-07 RX ORDER — METHYLERGONOVINE MALEATE 0.2 MG/ML
200 INJECTION INTRAVENOUS ONCE AS NEEDED
Status: DISCONTINUED | OUTPATIENT
Start: 2025-07-07 | End: 2025-07-10 | Stop reason: HOSPADM

## 2025-07-07 RX ORDER — SIMETHICONE 80 MG
1 TABLET,CHEWABLE ORAL EVERY 6 HOURS PRN
Status: DISCONTINUED | OUTPATIENT
Start: 2025-07-07 | End: 2025-07-10 | Stop reason: HOSPADM

## 2025-07-07 RX ORDER — FAMOTIDINE 10 MG/ML
20 INJECTION, SOLUTION INTRAVENOUS
Status: COMPLETED | OUTPATIENT
Start: 2025-07-07 | End: 2025-07-07

## 2025-07-07 RX ORDER — TRANEXAMIC ACID 10 MG/ML
1000 INJECTION, SOLUTION INTRAVENOUS EVERY 30 MIN PRN
Status: DISCONTINUED | OUTPATIENT
Start: 2025-07-07 | End: 2025-07-10 | Stop reason: HOSPADM

## 2025-07-07 RX ORDER — KETOROLAC TROMETHAMINE 30 MG/ML
30 INJECTION, SOLUTION INTRAMUSCULAR; INTRAVENOUS EVERY 6 HOURS
Status: DISCONTINUED | OUTPATIENT
Start: 2025-07-07 | End: 2025-07-07

## 2025-07-07 RX ORDER — OXYTOCIN/0.9 % SODIUM CHLORIDE 30/500 ML
PLASTIC BAG, INJECTION (ML) INTRAVENOUS
Status: DISCONTINUED | OUTPATIENT
Start: 2025-07-07 | End: 2025-07-07

## 2025-07-07 RX ORDER — CEFAZOLIN 2 G/1
2 INJECTION, POWDER, FOR SOLUTION INTRAMUSCULAR; INTRAVENOUS
Status: DISCONTINUED | OUTPATIENT
Start: 2025-07-07 | End: 2025-07-10 | Stop reason: HOSPADM

## 2025-07-07 RX ORDER — PRENATAL WITH FERROUS FUM AND FOLIC ACID 3080; 920; 120; 400; 22; 1.84; 3; 20; 10; 1; 12; 200; 27; 25; 2 [IU]/1; [IU]/1; MG/1; [IU]/1; MG/1; MG/1; MG/1; MG/1; MG/1; MG/1; UG/1; MG/1; MG/1; MG/1; MG/1
1 TABLET ORAL DAILY
Status: DISCONTINUED | OUTPATIENT
Start: 2025-07-07 | End: 2025-07-10 | Stop reason: HOSPADM

## 2025-07-07 RX ORDER — MIDAZOLAM HYDROCHLORIDE 1 MG/ML
INJECTION INTRAMUSCULAR; INTRAVENOUS
Status: DISCONTINUED | OUTPATIENT
Start: 2025-07-07 | End: 2025-07-07

## 2025-07-07 RX ORDER — ACETAMINOPHEN 500 MG
1000 TABLET ORAL
Status: COMPLETED | OUTPATIENT
Start: 2025-07-07 | End: 2025-07-07

## 2025-07-07 RX ORDER — OXYTOCIN-SODIUM CHLORIDE 0.9% IV SOLN 30 UNIT/500ML 30-0.9/5 UT/ML-%
95 SOLUTION INTRAVENOUS CONTINUOUS PRN
Status: DISCONTINUED | OUTPATIENT
Start: 2025-07-07 | End: 2025-07-10 | Stop reason: HOSPADM

## 2025-07-07 RX ORDER — FENTANYL CITRATE 50 UG/ML
INJECTION, SOLUTION INTRAMUSCULAR; INTRAVENOUS
Status: DISCONTINUED | OUTPATIENT
Start: 2025-07-07 | End: 2025-07-07

## 2025-07-07 RX ORDER — OXYCODONE HYDROCHLORIDE 5 MG/1
5 TABLET ORAL EVERY 4 HOURS PRN
Status: DISPENSED | OUTPATIENT
Start: 2025-07-07 | End: 2025-07-08

## 2025-07-07 RX ORDER — MORPHINE SULFATE 0.5 MG/ML
INJECTION, SOLUTION EPIDURAL; INTRATHECAL; INTRAVENOUS
Status: DISCONTINUED | OUTPATIENT
Start: 2025-07-07 | End: 2025-07-07

## 2025-07-07 RX ORDER — HYDROCORTISONE 25 MG/G
CREAM TOPICAL 3 TIMES DAILY PRN
Status: DISCONTINUED | OUTPATIENT
Start: 2025-07-07 | End: 2025-07-10 | Stop reason: HOSPADM

## 2025-07-07 RX ORDER — DEXAMETHASONE SODIUM PHOSPHATE 4 MG/ML
INJECTION, SOLUTION INTRA-ARTICULAR; INTRALESIONAL; INTRAMUSCULAR; INTRAVENOUS; SOFT TISSUE
Status: DISCONTINUED | OUTPATIENT
Start: 2025-07-07 | End: 2025-07-07

## 2025-07-07 RX ORDER — OXYCODONE HYDROCHLORIDE 5 MG/1
5 TABLET ORAL EVERY 4 HOURS PRN
Status: DISCONTINUED | OUTPATIENT
Start: 2025-07-07 | End: 2025-07-07

## 2025-07-07 RX ORDER — KETOROLAC TROMETHAMINE 30 MG/ML
INJECTION, SOLUTION INTRAMUSCULAR; INTRAVENOUS
Status: DISCONTINUED | OUTPATIENT
Start: 2025-07-07 | End: 2025-07-07

## 2025-07-07 RX ORDER — DOCUSATE SODIUM 100 MG/1
200 CAPSULE, LIQUID FILLED ORAL 2 TIMES DAILY
Status: DISCONTINUED | OUTPATIENT
Start: 2025-07-07 | End: 2025-07-10 | Stop reason: HOSPADM

## 2025-07-07 RX ORDER — SODIUM CHLORIDE, SODIUM LACTATE, POTASSIUM CHLORIDE, CALCIUM CHLORIDE 600; 310; 30; 20 MG/100ML; MG/100ML; MG/100ML; MG/100ML
INJECTION, SOLUTION INTRAVENOUS CONTINUOUS PRN
Status: DISCONTINUED | OUTPATIENT
Start: 2025-07-07 | End: 2025-07-07

## 2025-07-07 RX ORDER — BUPIVACAINE HYDROCHLORIDE 7.5 MG/ML
INJECTION INTRAVENOUS
Status: DISCONTINUED | OUTPATIENT
Start: 2025-07-07 | End: 2025-07-07

## 2025-07-07 RX ORDER — SODIUM CITRATE AND CITRIC ACID MONOHYDRATE 334; 500 MG/5ML; MG/5ML
30 SOLUTION ORAL
Status: COMPLETED | OUTPATIENT
Start: 2025-07-07 | End: 2025-07-07

## 2025-07-07 RX ORDER — OXYCODONE HYDROCHLORIDE 10 MG/1
10 TABLET ORAL EVERY 4 HOURS PRN
Status: DISPENSED | OUTPATIENT
Start: 2025-07-07 | End: 2025-07-08

## 2025-07-07 RX ORDER — DEXMEDETOMIDINE HYDROCHLORIDE 100 UG/ML
INJECTION, SOLUTION INTRAVENOUS
Status: DISCONTINUED | OUTPATIENT
Start: 2025-07-07 | End: 2025-07-07

## 2025-07-07 RX ADMIN — KETOROLAC TROMETHAMINE 30 MG: 30 INJECTION, SOLUTION INTRAMUSCULAR at 07:07

## 2025-07-07 RX ADMIN — ONDANSETRON HYDROCHLORIDE 4 MG: 2 INJECTION INTRAMUSCULAR; INTRAVENOUS at 07:07

## 2025-07-07 RX ADMIN — OXYCODONE HYDROCHLORIDE 5 MG: 5 TABLET ORAL at 04:07

## 2025-07-07 RX ADMIN — FAMOTIDINE 20 MG: 10 INJECTION, SOLUTION INTRAVENOUS at 06:07

## 2025-07-07 RX ADMIN — SIMETHICONE 80 MG: 80 TABLET, CHEWABLE ORAL at 08:07

## 2025-07-07 RX ADMIN — CEFAZOLIN 2 G: 330 INJECTION, POWDER, FOR SOLUTION INTRAMUSCULAR; INTRAVENOUS at 07:07

## 2025-07-07 RX ADMIN — DEXMEDETOMIDINE 8 MCG: 200 INJECTION, SOLUTION INTRAVENOUS at 07:07

## 2025-07-07 RX ADMIN — KETOROLAC TROMETHAMINE 30 MG: 30 INJECTION, SOLUTION INTRAMUSCULAR at 08:07

## 2025-07-07 RX ADMIN — SIMETHICONE 80 MG: 80 TABLET, CHEWABLE ORAL at 03:07

## 2025-07-07 RX ADMIN — Medication 95 MILLI-UNITS/MIN: at 09:07

## 2025-07-07 RX ADMIN — SODIUM CHLORIDE, POTASSIUM CHLORIDE, SODIUM LACTATE AND CALCIUM CHLORIDE: 600; 310; 30; 20 INJECTION, SOLUTION INTRAVENOUS at 06:07

## 2025-07-07 RX ADMIN — Medication 3 ML: at 07:07

## 2025-07-07 RX ADMIN — Medication 3 UNITS: at 08:07

## 2025-07-07 RX ADMIN — BUPIVACAINE HYDROCHLORIDE IN DEXTROSE 1.6 ML: 7.5 INJECTION, SOLUTION SUBARACHNOID at 07:07

## 2025-07-07 RX ADMIN — DEXAMETHASONE SODIUM PHOSPHATE 4 MG: 4 INJECTION, SOLUTION INTRAMUSCULAR; INTRAVENOUS at 07:07

## 2025-07-07 RX ADMIN — DOCUSATE SODIUM 200 MG: 100 CAPSULE, LIQUID FILLED ORAL at 08:07

## 2025-07-07 RX ADMIN — SODIUM CHLORIDE, SODIUM LACTATE, POTASSIUM CHLORIDE, AND CALCIUM CHLORIDE: 600; 310; 30; 20 INJECTION, SOLUTION INTRAVENOUS at 07:07

## 2025-07-07 RX ADMIN — OXYCODONE HYDROCHLORIDE 5 MG: 5 TABLET ORAL at 08:07

## 2025-07-07 RX ADMIN — ACETAMINOPHEN 650 MG: 325 TABLET ORAL at 08:07

## 2025-07-07 RX ADMIN — ACETAMINOPHEN 1000 MG: 500 TABLET, FILM COATED ORAL at 06:07

## 2025-07-07 RX ADMIN — FENTANYL CITRATE 10 MCG: 50 INJECTION, SOLUTION INTRAMUSCULAR; INTRAVENOUS at 07:07

## 2025-07-07 RX ADMIN — Medication 0.1 MG: at 07:07

## 2025-07-07 RX ADMIN — PRENATAL VIT W/ FE FUMARATE-FA TAB 27-0.8 MG 1 TABLET: 27-0.8 TAB at 09:07

## 2025-07-07 RX ADMIN — PHENYLEPHRINE HYDROCHLORIDE 0.5 MCG/KG/MIN: 10 INJECTION INTRAVENOUS at 07:07

## 2025-07-07 RX ADMIN — ACETAMINOPHEN 650 MG: 325 TABLET ORAL at 03:07

## 2025-07-07 RX ADMIN — KETOROLAC TROMETHAMINE 30 MG: 30 INJECTION, SOLUTION INTRAMUSCULAR at 03:07

## 2025-07-07 RX ADMIN — SODIUM CITRATE AND CITRIC ACID MONOHYDRATE 30 ML: 334; 500 SOLUTION ORAL at 06:07

## 2025-07-07 RX ADMIN — BUPROPION HYDROCHLORIDE 300 MG: 150 TABLET, EXTENDED RELEASE ORAL at 09:07

## 2025-07-07 RX ADMIN — DOCUSATE SODIUM 200 MG: 100 CAPSULE, LIQUID FILLED ORAL at 09:07

## 2025-07-07 RX ADMIN — MIDAZOLAM HYDROCHLORIDE 2 MG: 1 INJECTION, SOLUTION INTRAMUSCULAR; INTRAVENOUS at 07:07

## 2025-07-07 RX ADMIN — DEXMEDETOMIDINE 4 MCG: 200 INJECTION, SOLUTION INTRAVENOUS at 07:07

## 2025-07-07 RX ADMIN — SENNOSIDES AND DOCUSATE SODIUM 1 TABLET: 50; 8.6 TABLET ORAL at 05:07

## 2025-07-07 NOTE — PLAN OF CARE
Pt arrive to room via stretcher. Transfer to bed by self, towl and pads changed. IV patent and infusing with no problems. Cadena present and patent, flowing with gravity. VSS. Fundus firm and midline, bleeding Moderately. Ice water at bedside. Oriented to room, nurse, call-bell, board, edu papers, and mother-baby guidebook. States understanding of Warning signs to look for. Edu on fall risk, states understanding to not get up without calling nurse. Bed in low position, HOB up, side rails up x2, CBIR, support person in room.     Problem:  Delivery  Goal: Bleeding is Controlled  Outcome: Met  Goal: Stable Fetal Wellbeing  Outcome: Met  Goal: Absence of Infection Signs and Symptoms  Outcome: Met  Goal: Effective Oxygenation and Ventilation  Outcome: Met     Problem:  Fall Injury Risk  Goal: Absence of Fall, Infant Drop and Related Injury  Outcome: Progressing     Problem: Adult Inpatient Plan of Care  Goal: Plan of Care Review  Outcome: Progressing  Goal: Patient-Specific Goal (Individualized)  Outcome: Progressing  Goal: Absence of Hospital-Acquired Illness or Injury  Outcome: Progressing  Goal: Optimal Comfort and Wellbeing  Outcome: Progressing  Goal: Readiness for Transition of Care  Outcome: Progressing     Problem: Anesthesia/Analgesia, Neuraxial  Goal: Safe, Effective Infusion Delivery  Outcome: Progressing  Goal: Absence of Infection Signs and Symptoms  Outcome: Progressing  Goal: Nausea and Vomiting Relief  Outcome: Progressing  Goal: Effective Pain Control  Outcome: Progressing  Goal: Effective Oxygenation and Ventilation  Outcome: Progressing  Goal: Baseline Motor Function  Outcome: Progressing  Goal: Effective Urinary Elimination  Outcome: Progressing     Problem: Postpartum ( Delivery)  Goal: Successful Parent Role Transition  Outcome: Progressing  Goal: Hemostasis  Outcome: Progressing  Goal: Effective Bowel Elimination  Outcome: Progressing  Goal: Fluid and Electrolyte  Balance  Outcome: Progressing  Goal: Absence of Infection Signs and Symptoms  Outcome: Progressing  Goal: Anesthesia/Sedation Recovery  Outcome: Progressing  Goal: Optimal Pain Control and Function  Outcome: Progressing  Goal: Nausea and Vomiting Relief  Outcome: Progressing  Goal: Effective Urinary Elimination  Outcome: Progressing  Goal: Effective Oxygenation and Ventilation  Outcome: Progressing     Problem: Breastfeeding  Goal: Effective Breastfeeding  Outcome: Progressing     Problem: Fall Injury Risk  Goal: Absence of Fall and Fall-Related Injury  Outcome: Progressing     Problem: Fatigue  Goal: Improved Activity Tolerance  Outcome: Progressing     Problem: Infection  Goal: Absence of Infection Signs and Symptoms  Outcome: Progressing     Problem: Pain Acute  Goal: Optimal Pain Control and Function  Outcome: Progressing     Problem: Skin Injury Risk Increased  Goal: Skin Health and Integrity  Outcome: Progressing

## 2025-07-07 NOTE — NURSING
Pt has full feeling in legs, no signs of distress. Cadena and SCDs DC. Pt ambulated to Bathroom with stand by assistance. Pericare given and edu demonstrated. Underwear and pads with tucks put on. Gown changed, hands washed. Encouraged fluid intake, ice water given. states understanding on timing to void and to call out when voided. Basin cleaned. Bed kieran pad changed. Pt denies any other needs or concerns.      07/07/25 1630   [REMOVED]      Urethral Catheter 07/07/25 0717 Straight-tip 16 Fr.   Removal Date/Time: 07/07/25 1630  Placement Date/Time: 07/07/25 0717   Present Prior to Hospital Arrival?: No  Hand Hygiene: Performed  Inserted by: RN  Insertion attempts (enter comment if more than 2 attempts): 1  Catheter Type: Straight-tip  Tube S...   Output (mL) 650 mL   Genitourinary () Interventions   Urinary Elimination Promotion frequent voiding encouraged   Reproductive   Uterus WDL WDL   Uterus Consistency firm without massage   Fundal Height at umbilicus   Lochia 1-3 Days WDL   Lochia 1-3 Days WDL WDL   Lochia Color rubra   Lochia Amount moderate (less than 15 cm on pad/hr)   Lochia Odor none   Hygiene Care   Perineal Care perineal hygiene encouraged;perineal spray bottle/warm water use encouraged;perineum cleansed;absorbent brief/pad changed

## 2025-07-07 NOTE — PLAN OF CARE
Problem:  Fall Injury Risk  Goal: Absence of Fall, Infant Drop and Related Injury  Outcome: Progressing     Problem: Adult Inpatient Plan of Care  Goal: Plan of Care Review  Outcome: Progressing  Goal: Patient-Specific Goal (Individualized)  Outcome: Progressing  Goal: Absence of Hospital-Acquired Illness or Injury  Outcome: Progressing  Goal: Optimal Comfort and Wellbeing  Outcome: Progressing  Goal: Readiness for Transition of Care  Outcome: Progressing     Problem: Anesthesia/Analgesia, Neuraxial  Goal: Safe, Effective Infusion Delivery  Outcome: Progressing  Goal: Absence of Infection Signs and Symptoms  Outcome: Progressing  Goal: Nausea and Vomiting Relief  Outcome: Progressing  Goal: Effective Pain Control  Outcome: Progressing  Goal: Effective Oxygenation and Ventilation  Outcome: Progressing  Goal: Baseline Motor Function  Outcome: Progressing  Goal: Effective Urinary Elimination  Outcome: Progressing     Problem: Postpartum ( Delivery)  Goal: Successful Parent Role Transition  Outcome: Progressing  Goal: Hemostasis  Outcome: Progressing  Goal: Effective Bowel Elimination  Outcome: Progressing  Goal: Fluid and Electrolyte Balance  Outcome: Progressing  Goal: Absence of Infection Signs and Symptoms  Outcome: Progressing  Goal: Anesthesia/Sedation Recovery  Outcome: Progressing  Goal: Optimal Pain Control and Function  Outcome: Progressing  Goal: Nausea and Vomiting Relief  Outcome: Progressing  Goal: Effective Urinary Elimination  Outcome: Progressing  Goal: Effective Oxygenation and Ventilation  Outcome: Progressing     Problem: Breastfeeding  Goal: Effective Breastfeeding  Outcome: Progressing     Problem: Fall Injury Risk  Goal: Absence of Fall and Fall-Related Injury  Outcome: Progressing     Problem: Fatigue  Goal: Improved Activity Tolerance  Outcome: Progressing     Problem: Infection  Goal: Absence of Infection Signs and Symptoms  Outcome: Progressing     Problem: Pain Acute  Goal:  Optimal Pain Control and Function  Outcome: Progressing     Problem:  Delivery  Goal: Bleeding is Controlled  Outcome: Progressing  Goal: Stable Fetal Wellbeing  Outcome: Progressing  Goal: Absence of Infection Signs and Symptoms  Outcome: Progressing  Goal: Effective Oxygenation and Ventilation  Outcome: Progressing

## 2025-07-07 NOTE — TRANSFER OF CARE
"Anesthesia Transfer of Care Note    Patient: Soha Randhawa    Procedure(s) Performed: Procedure(s) (LRB):   SECTION (N/A)    Patient location: Labor and Delivery    Anesthesia Type: spinal    Transport from OR: Transported from OR on room air with adequate spontaneous ventilation    Post pain: adequate analgesia    Post assessment: no apparent anesthetic complications    Post vital signs: stable    Level of consciousness: awake and alert    Nausea/Vomiting: no nausea/vomiting    Complications: none    Transfer of care protocol was followed      Last vitals: Visit Vitals  BP (!) 102/58   Pulse 79   Temp 36.9 °C (98.4 °F) (Oral)   Resp 16   Ht 5' 5" (1.651 m)   Wt 100.7 kg (222 lb 0.1 oz)   LMP 2024 (Exact Date)   SpO2 95%   Breastfeeding No   BMI 36.94 kg/m²     "

## 2025-07-07 NOTE — ANESTHESIA PROCEDURE NOTES
Spinal    Diagnosis: IUP  Patient location during procedure: OR  Start time: 7/7/2025 7:09 AM  Timeout: 7/7/2025 7:08 AM  End time: 7/7/2025 7:11 AM    Staffing  Authorizing Provider: Frantz Green DO  Performing Provider: Frantz Green DO    Staffing  Performed by: Frantz Green DO  Authorized by: Frantz Green DO    Preanesthetic Checklist  Completed: patient identified, IV checked, site marked, risks and benefits discussed, surgical consent, monitors and equipment checked, pre-op evaluation and timeout performed  Spinal Block  Patient position: sitting  Prep: ChloraPrep  Patient monitoring: heart rate, continuous pulse ox and frequent blood pressure checks  Approach: midline  Location: L3-4  Injection technique: single shot  CSF Fluid: clear free-flowing CSF  Needle  Needle type: Kendra   Needle gauge: 25 G  Needle length: 3.5 in  Needle localization: anatomical landmarks  Assessment  Ease of block: easy  Patient's tolerance of the procedure: comfortable throughout block

## 2025-07-07 NOTE — H&P
HISTORY AND PHYSICAL                                                OBSTETRICS          Subjective:       Soha Randhawa is a 34 y.o.  female with IUP at 39w1d weeks gestation who presents for rLTCS.    Patient denies contractions, denies vaginal bleeding, denies LOF.   Fetal Movement: normal.    This IUP is complicated by HSV (spec in OR), depression, tobacco use.      Review of Systems   Constitutional:  Negative for chills and fever.   Eyes:  Negative for visual disturbance.   Respiratory:  Negative for shortness of breath.    Cardiovascular:  Negative for chest pain.   Gastrointestinal:  Negative for abdominal pain, nausea and vomiting.   Genitourinary:  Negative for vaginal bleeding and vaginal discharge.   Neurological:  Negative for headaches.       PMHx:   Past Medical History:   Diagnosis Date    Chicken pox     Herpes simplex virus (HSV) infection     Major depressive disorder 2023    Nursing difficulty     Tobacco use 2020    Urinary tract infection        PSHx:   Past Surgical History:   Procedure Laterality Date     SECTION N/A 2020    Procedure:  SECTION;  Surgeon: Julie R. Jeansonne, MD;  Location: [facility];  Service: OB/GYN;  Laterality: N/A;     SECTION  2020       All: Review of patient's allergies indicates:  No Known Allergies    Meds: Prescriptions Prior to Admission[1]    SH: Social History[2]    FH:   Family History   Problem Relation Name Age of Onset    Diabetes Paternal Grandmother      Miscarriages / Stillbirths Maternal Grandmother      Heart disease Maternal Grandfather      Diabetes Father Vincent Brown     Cancer Father Vincent Brown         Lung CA smoker    Breast cancer Neg Hx      Colon cancer Neg Hx      Ovarian cancer Neg Hx      Uterine cancer Neg Hx      Cervical cancer Neg Hx         OBHx:   OB History    Para Term  AB Living   2 1 1 0 0 1   SAB IAB Ectopic Multiple Live Births   0 0 0 0 1      #  "Outcome Date GA Lbr Sarthak/2nd Weight Sex Type Anes PTL Lv   2 Current            1 Term 20 40w3d  3.77 kg (8 lb 5 oz) F CS-LTranv EPI N JJ      Name: MARY CARMEN STOCK      Apgar1: 8  Apgar5: 9       Objective:       Ht 5' 5" (1.651 m)   Wt 100.7 kg (222 lb 0.1 oz)   LMP 2024 (Exact Date)   BMI 36.94 kg/m²     Vitals:    25 0500   Weight: 100.7 kg (222 lb 0.1 oz)   Height: 5' 5" (1.651 m)       General:   alert, appears stated age and cooperative, no apparent distress   HENT:  normocephalic, atraumatic   Eyes:  extraocular movements and conjunctivae normal   Neck:  supple, range of motion normal, no thyromegaly   Lungs:   no respiratory distress   Heart:   regular rate   Abdomen:  soft, non-tender, non-distended but gravid, no rebound or guarding    Extremities negative edema, negative erythema   FHT: 130, moderate variability, +accels, -decels;  Cat 1 (reassuring)                 TOCO: Q 5-8 minutes   Presentations: cephalic by ultrasound   Cervix:  deferred                            Sterile Speculum Exam: to be completed in OR    EFW by Leopold's: 7.5      Lab Review  Blood Type O POS  GBBS: negative  Rubella: Immune  RPR: NR  HIV: negative  HepB: negative       Assessment:       39w1d weeks gestation with rLTCS.    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          Plan:   rLTCS   Risks, benefits, alternatives and possible complications have been discussed in detail with the patient.   - Consents signed and to chart  - Admit to Labor and Delivery unit   - Epidural per Anesthesia  - Draw CBC, T&S  - Notify Staff    Post-Partum Hemorrhage risk - medium    HSV  -Speculum exam to be done in OR  -On valtrex     Depression   -continue Buproprion         Kerline " MD Lauren  Obstetrics & Gynecology, PGY-2           [1]   Medications Prior to Admission   Medication Sig Dispense Refill Last Dose/Taking    buPROPion (WELLBUTRIN XL) 300 MG 24 hr tablet Take 300 mg by mouth once daily.       magnesium oxide-Mg AA chelate 300 mg Cap        NON FORMULARY MEDICATION NAC       omega-3 fatty acids/fish oil (FISH OIL-OMEGA-3 FATTY ACIDS) 300-1,000 mg capsule Take by mouth once daily.       prenatal 25/iron fum/folic/dha (PRENATAL-1 ORAL)        valACYclovir (VALTREX) 500 MG tablet Take 1 tablet (500 mg total) by mouth 2 (two) times daily. 180 tablet 3    [2]   Social History  Socioeconomic History    Marital status:    Tobacco Use    Smoking status: Former     Current packs/day: 0.00     Average packs/day: 0.5 packs/day for 17.3 years (8.7 ttl pk-yrs)     Types: Cigarettes, Vaping with nicotine     Start date: 2002     Quit date: 2019     Years since quittin.1    Smokeless tobacco: Never   Substance and Sexual Activity    Alcohol use: Not Currently    Drug use: Not Currently     Types: Marijuana    Sexual activity: Yes     Partners: Male     Birth control/protection: None     Social Drivers of Health     Financial Resource Strain: Low Risk  (2025)    Overall Financial Resource Strain (CARDIA)     Difficulty of Paying Living Expenses: Not hard at all   Food Insecurity: No Food Insecurity (2025)    Hunger Vital Sign     Worried About Running Out of Food in the Last Year: Never true     Ran Out of Food in the Last Year: Never true   Transportation Needs: No Transportation Needs (2025)    PRAPARE - Transportation     Lack of Transportation (Medical): No     Lack of Transportation (Non-Medical): No   Physical Activity: Insufficiently Active (2025)    Exercise Vital Sign     Days of Exercise per Week: 3 days     Minutes of Exercise per Session: 30 min   Stress: Stress Concern Present (2025)    [employer] [place] [place] [employer] - Occupational  Stress Questionnaire     Feeling of Stress : To some extent   Housing Stability: Low Risk  (5/25/2025)    Housing Stability Vital Sign     Unable to Pay for Housing in the Last Year: No     Number of Times Moved in the Last Year: 0     Homeless in the Last Year: No

## 2025-07-07 NOTE — ANESTHESIA PREPROCEDURE EVALUATION
"[facility]  Anesthesia Pre-Operative Evaluation         Patient Name: Soha Randhawa  YOB: 1990  MRN: [id number]    2025      Soha Randhawa is a 34 y.o. female  @ 39w1d who presents for rLTCS. Her IUP has been c/b HSV (spec in OR), depression, tobacco use. Denies cHTN, asthma, DM, bleeding diathesis, anticoag, spinal d/o or prior spinal surgery.    Prior C/S . Required 30 units of pitocin and 200 mcg of methylergonovine.       OB History    Para Term  AB Living   2 1 1   1   SAB IAB Ectopic Multiple Live Births      0 1      # Outcome Date GA Lbr Sarthak/2nd Weight Sex Type Anes PTL Lv   2 Current            1 Term 20 40w3d  3.77 kg (8 lb 5 oz) F CS-LTranv EPI N JJ       Review of patient's allergies indicates:  No Known Allergies    Wt Readings from Last 1 Encounters:   25 0500 100.7 kg (222 lb 0.1 oz)       BP Readings from Last 3 Encounters:   25 130/76   25 109/69   25 120/60       Problem List[1]    Past Surgical History:   Procedure Laterality Date     SECTION N/A 2020    Procedure:  SECTION;  Surgeon: Julie R. Jeansonne, MD;  Location: [facility] L&D;  Service: OB/GYN;  Laterality: N/A;     SECTION  2020       Social History[2]      Chemistry        Component Value Date/Time     2024 1140    K 3.8 2024 1140     2024 1140    CO2 22 (L) 2024 1140    BUN 10 2024 1140    CREATININE 0.7 2024 1140    GLU 94 2024 1140        Component Value Date/Time    CALCIUM 9.4 2024 1140    ALKPHOS 42 2024 1140    AST 12 2024 1140    ALT 25 2024 1140    BILITOT 0.4 2024 1140            Lab Results   Component Value Date    WBC 6.18 2025    HGB 11.7 (L) 2025    HCT 34.6 (L) 2025    MCV 88 2025     2025       No results for input(s): "PT", "INR", "PROTIME", "APTT" in the " last 72 hours.            Pre-op Assessment    I have reviewed the Patient Summary Reports.     I have reviewed the Nursing Notes. I have reviewed the NPO Status.   I have reviewed the Medications.     Review of Systems  Anesthesia Hx:  No problems with previous Anesthesia   History of prior surgery of interest to airway management or planning:  Previous anesthesia: Epidural        Denies Family Hx of Anesthesia complications.    Denies Personal Hx of Anesthesia complications.                    Social:   Tobacco use      Hematology/Oncology:  Hematology Normal   Oncology Normal                                   EENT/Dental:  EENT/Dental Normal           Cardiovascular:  Cardiovascular Normal                                              Pulmonary:  Pulmonary Normal                       Renal/:  Renal/ Normal                 Hepatic/GI:  Hepatic/GI Normal                    Musculoskeletal:  Musculoskeletal Normal                Neurological:  Neurology Normal                                      Endocrine:  Endocrine Normal            Dermatological:  Skin Normal    Psych:    depression              Physical Exam  General: Well nourished, Cooperative, Alert and Oriented    Airway:  Mallampati: II   Mouth Opening: Normal  TM Distance: Normal  Tongue: Normal  Neck ROM: Normal ROM    Dental:  Intact    Chest/Lungs:  Normal Respiratory Rate    Heart:  Rate: Normal  Rhythm: Regular Rhythm      Anesthesia Plan  Type of Anesthesia, risks & benefits discussed:    Anesthesia Type: Gen ETT, CSE, Epidural, Spinal  Intra-op Monitoring Plan: Standard ASA Monitors  Post Op Pain Control Plan: multimodal analgesia and epidural analgesia  Informed Consent: Informed consent signed with the Patient and all parties understand the risks and agree with anesthesia plan.  All questions answered.   ASA Score: 2  Day of Surgery Review of History & Physical: H&P Update referred to the surgeon/provider.    Ready For Surgery From  Anesthesia Perspective.     .             [1]  Patient Active Problem List  Diagnosis    Encounter for supervision of normal pregnancy, antepartum    Genital HSV    Vapes non-nicotine containing substance    Depression, major, in remission    Obesity (BMI 30-39.9)    History of  section   [2]  Social History  Socioeconomic History    Marital status:    Tobacco Use    Smoking status: Former     Current packs/day: 0.00     Average packs/day: 0.5 packs/day for 17.3 years (8.7 ttl pk-yrs)     Types: Cigarettes, Vaping with nicotine     Start date: 2002     Quit date: 2019     Years since quittin.1    Smokeless tobacco: Never   Substance and Sexual Activity    Alcohol use: Not Currently    Drug use: Not Currently     Types: Marijuana    Sexual activity: Yes     Partners: Male     Birth control/protection: None     Social Drivers of Health     Financial Resource Strain: Low Risk  (2025)    Overall Financial Resource Strain (CARDIA)     Difficulty of Paying Living Expenses: Not hard at all   Food Insecurity: No Food Insecurity (2025)    Hunger Vital Sign     Worried About Running Out of Food in the Last Year: Never true     Ran Out of Food in the Last Year: Never true   Transportation Needs: No Transportation Needs (2025)    PRAPARE - Transportation     Lack of Transportation (Medical): No     Lack of Transportation (Non-Medical): No   Physical Activity: Insufficiently Active (2025)    Exercise Vital Sign     Days of Exercise per Week: 3 days     Minutes of Exercise per Session: 30 min   Stress: Stress Concern Present (2025)    [employer] [place] [employer] - Occupational Stress Questionnaire     Feeling of Stress : To some extent   Housing Stability: Low Risk  (2025)    Housing Stability Vital Sign     Unable to Pay for Housing in the Last Year: No     Number of Times Moved in the Last Year: 0     Homeless in the Last Year: No

## 2025-07-07 NOTE — L&D DELIVERY NOTE
[facility] - Labor & Delivery   Section   Operative Note    SUMMARY     Date of Procedure: 2025     Procedure: Procedure(s) (LRB):   SECTION (N/A)    Surgeons and Role:     * Leah Johnson MD - Primary     * Khadijah Jewell MD - Resident - Assisting        Pre-Operative Diagnosis:   Encounter for supervision of normal pregnancy, antepartum, unspecified  [Z34.90]  HSV  Depression    Post-Operative Diagnosis: Post-Op Diagnosis Codes:     * Encounter for supervision of normal pregnancy, antepartum, unspecified  [Z34.90]  HSV  Depression  S/p rLTCS     Anesthesia: Spinal/Epidural    Technical Procedures Used: rLTCS via pfannenstiel incision            Description of the Findings of the Procedure:   SSE with no signs of lesions prior to operation   Pfannenstiel incision carried down to mildly adherent fascia, peritoneum entered sharply via scalpel   Bladder flap created without complication with placement of bladder blade   Uterine incision made, with delivery of female infant weighing 3440grams, APGARS 8/9 via standard cephalic maneuvers.   Hystereotomy closed via standard procedure, with uterus, bilateral ovaries, and fallopian tubes appearing normal and returned to abdomen without complication   Fascia was reapproximated with a single running suture of 1 PDS  Subq and skin reapproximated via single running sutures of vicryl 2-0 and monocryl, respectively with excellent hemostasis observed.        Complications: No    Blood Loss: 525 cc       Section Procedure Note    Procedure:     The patient was seen in the Holding Room. The risks, benefits, complications, treatment options, and expected outcomes were discussed with the patient.  The patient concurred with the proposed plan, giving informed consent.  The patient was taken to Operating Room, identified as Soha Randhawa and the procedure verified as rLTCS. A Time Out was held and the above information  confirmed.    After induction of anesthesia, the patient was prepped and draped in the usual sterile manner while placed in a dorsal supine position with a left lateral tilt.  A castillo catheter was also placed per nursing. Preoperative antibiotics Ancef 2 g were administered. An allis test was performed confirming adequate anesthesia.  A Pfannenstiel incision was made and carried down through the subcutaneous tissue to the fascia. Fascial incision was made and extended transversely with curved pak scissors. The fascia was grasped with [facility] clamps and  from the underlying rectus tissue superiorly and inferiorly, with mild adhesions noted. The peritoneum was identified, found to be free of adherent bowel, and entered sharply with two allis clamps and a scalpel. Peritoneal incision was extended longitudinally. The vesico-uterine peritoneum was identified, and bladder blade was inserted. The vesico-uterine peritoneum was incised transversely and the bladder flap was bluntly freed from the lower uterine segment. The bladder blade was reinserted to keep the bladder out of the operative field. A low transverse uterine incision was made with knife and extended with cephalo-caudad traction. The amniotic sac was ruptured via hemostat, and the infant was noted to be in cephalic presentation. The fetal head was brought to the incision and elevated out of the pelvis. The patient delivered a single viable female infant without difficulty.  Infant weighed 3440 grams with Apgar scores of 8/9 at one and five minutes respectively. After the umbilical cord was clamped and cut, cord blood was obtained for evaluation. The placenta was removed intact, appeared normal, and was discarded. The uterus was exteriorized. The uterine incision was closed with running locked sutures of 1 chromic suture. Hemostasis was observed. The uterine outline, tubes and ovaries appeared normal. The uterus was returned to the abdominal cavity.  Incision was reinspected and good hemostasis was noted. The abdominal cavity was wiped with laps to remove clots, at which time the  peritoneum and muscles were not reapproximated. The fascia was then reapproximated with running sutures of PDS. The subcutaneous fat was reapproximated with a single running suture of 2-0 vicryl, and skin was reapproximated with 4-0 monocryl.    Instrument, sponge, and needle counts were correct prior the abdominal closure and at the conclusion of the case.     Pt tolerated procedure well and was in stable condition after the procedure.      **NOTE: This patient is NOT a candidate for trial of labor after  delivery following rLTCS**    Mackenzie Jewell MD (Mary)   Obstetrics and Gynecology, PGY2       Specimens:   Specimen (24h ago, onward)      None            Condition: Good    VTE Risk Mitigation (From admission, onward)           Ordered     Reason for No Pharmacological VTE Prophylaxis  Once        Question:  Reasons:  Answer:  Neuraxial Anesthesia    25 0750     Place sequential compression device  Until discontinued        Comments: Discontinue when catheter removed    25 0608                    Disposition: PACU - hemodynamically stable.    Attestation: Good         Delivery Information for Girl Soha Randhawa    Birth information:  YOB: 2025   Time of birth: 7:31 AM   Sex: female   Head Delivery Date/Time: 2025  7:31 AM   Delivery type: , Low Transverse   Gestational Age: 39w1d       Delivery Providers    Delivering clinician: Leah Johnson MD   Provider Role    Delfino Rinaldi RN Hillis, Emily A, RN Manzella, Macy, ST Chambers, Mary, MD               Measurements    Weight: 3440 g  Weight (lbs): 7 lb 9.3 oz  Length:          Apgars    Living status: Living  Apgar Component Scores:  1 min.:  5 min.:  10 min.:  15 min.:  20 min.:    Skin color:  0  1       Heart rate:  2  2       Reflex irritability:  2  2        Muscle tone:  2  2       Respiratory effort:  2  2       Total:  8  9       Apgars assigned by: VISHAL MONTALVO RN         Operative Delivery    Forceps attempted?: No  Vacuum extractor attempted?: No         Shoulder Dystocia    Shoulder dystocia present?: No           Presentation    Presentation: Vertex  Position: Left Occiput Anterior           Interventions/Resuscitation    Method: Bulb Suctioning, Tactile Stimulation       Cord    Vessels: 3 vessels  Complications: None  Delayed Cord Clamping?: Yes  Cord Clamped Date/Time: 2025  7:32 AM  Cord Blood Disposition: Sent with Baby  Gases Sent?: No  Stem Cell Collection (by MD): No       Placenta    Placenta delivery date/time: 2025 07:33  Placenta removal: Spontaneous  Placenta appearance: Intact  Placenta disposition: Donation           Labor Events:       labor: No     Labor Onset Date/Time:         Dilation Complete Date/Time:         Start Pushing Date/Time:         Start Pushing Date/Time:       Rupture Date/Time:            Rupture type:          Fluid Amount:       Fluid Color:                steroids:       Antibiotics given for GBS:       Induction:       Indications for induction:        Augmentation:       Indications for augmentation:       Labor complications: None     Additional complications:          Cervical ripening:                     Delivery:      Episiotomy:       Indication for Episiotomy:       Perineal Lacerations:   Repaired:      Periurethral Laceration:   Repaired:     Labial Laceration:   Repaired:     Sulcus Laceration:   Repaired:     Vaginal Laceration:   Repaired:     Cervical Laceration:   Repaired:     Repair suture:       Repair # of packets:       Last Value - EBL - Nursing (mL):       Sum - EBL - Nursing (mL): 0     Last Value - EBL - Anesthesia (mL):      Calculated QBL (mL): 565     Running total QBL (mL): 565     Vaginal Sweep Performed:       Surgicount Correct:       Vaginal Packing:   Quantity:        Other providers:       Anesthesia    Method: Spinal          Details (if applicable):  Trial of Labor No    Categorization: Repeat    Priority: Routine   Indications for : Repeat Section   Incision Type: low transverse     Additional  information:  Forceps:    Vacuum:    Breech:    Observed anomalies    Other (Comments):

## 2025-07-07 NOTE — BRIEF OP NOTE
[facility] - Labor & Delivery  Brief Operative Note    SUMMARY     Surgery Date: 2025     Surgeons and Role:     * Leah Johnson MD - Primary     * Khadijah Jewell MD - Resident - Assisting        Pre-op Diagnosis:  Encounter for supervision of normal pregnancy, antepartum, unspecified  [Z34.90]    Post-op Diagnosis:  Post-Op Diagnosis Codes:     * Encounter for supervision of normal pregnancy, antepartum, unspecified  [Z34.90]    Procedure(s) (LRB):   SECTION (N/A)    Anesthesia: Spinal/Epidural    Implants:  * No implants in log *    Operative Findings:       SSE with no signs of lesions prior to operation   Pfannenstiel incision carried down to mildly adherent fascia, peritoneum entered sharply via scalpel   Bladder flap created without complication with placement of bladder blade   Uterine incision made, with delivery of female infant weighing 3440grams, APGARS 8/9 via standard cephalic maneuvers.   Hystereotomy closed via standard procedure, with uterus, bilateral ovaries, and fallopian tubes appearing normal and returned to abdomen without complication   Fascia was reapproximated with a single running suture of 1 PDS  Subq and skin reapproximated via single running sutures of vicryl 2-0 and monocryl, respectively with excellent hemostasis observed.      Mackenzie Jewell MD (Mary)   Obstetrics and Gynecology, PGY2       Estimated Blood Loss: 525 cc     Estimated Blood Loss has been documented.         Specimens:   Specimen (24h ago, onward)      None

## 2025-07-08 LAB
ABSOLUTE EOSINOPHIL (OHS): 0.09 K/UL
ABSOLUTE MONOCYTE (OHS): 0.65 K/UL (ref 0.3–1)
ABSOLUTE NEUTROPHIL COUNT (OHS): 8.29 K/UL (ref 1.8–7.7)
BASOPHILS # BLD AUTO: 0.02 K/UL
BASOPHILS NFR BLD AUTO: 0.2 %
ERYTHROCYTE [DISTWIDTH] IN BLOOD BY AUTOMATED COUNT: 14.2 % (ref 11.5–14.5)
HCT VFR BLD AUTO: 31.6 % (ref 37–48.5)
HGB BLD-MCNC: 10.7 GM/DL (ref 12–16)
IMM GRANULOCYTES # BLD AUTO: 0.04 K/UL (ref 0–0.04)
IMM GRANULOCYTES NFR BLD AUTO: 0.4 % (ref 0–0.5)
LYMPHOCYTES # BLD AUTO: 1.8 K/UL (ref 1–4.8)
MCH RBC QN AUTO: 30.5 PG (ref 27–31)
MCHC RBC AUTO-ENTMCNC: 33.9 G/DL (ref 32–36)
MCV RBC AUTO: 90 FL (ref 82–98)
NUCLEATED RBC (/100WBC) (OHS): 0 /100 WBC
PLATELET # BLD AUTO: 166 K/UL (ref 150–450)
PMV BLD AUTO: 9.6 FL (ref 9.2–12.9)
RBC # BLD AUTO: 3.51 M/UL (ref 4–5.4)
RELATIVE EOSINOPHIL (OHS): 0.8 %
RELATIVE LYMPHOCYTE (OHS): 16.5 % (ref 18–48)
RELATIVE MONOCYTE (OHS): 6 % (ref 4–15)
RELATIVE NEUTROPHIL (OHS): 76.1 % (ref 38–73)
WBC # BLD AUTO: 10.89 K/UL (ref 3.9–12.7)

## 2025-07-08 PROCEDURE — 63600175 PHARM REV CODE 636 W HCPCS: Mod: JZ,TB

## 2025-07-08 PROCEDURE — 85025 COMPLETE CBC W/AUTO DIFF WBC: CPT

## 2025-07-08 PROCEDURE — 25000003 PHARM REV CODE 250

## 2025-07-08 PROCEDURE — 25000003 PHARM REV CODE 250: Performed by: OBSTETRICS & GYNECOLOGY

## 2025-07-08 PROCEDURE — 11000001 HC ACUTE MED/SURG PRIVATE ROOM

## 2025-07-08 PROCEDURE — 36415 COLL VENOUS BLD VENIPUNCTURE: CPT

## 2025-07-08 PROCEDURE — 25000003 PHARM REV CODE 250: Performed by: STUDENT IN AN ORGANIZED HEALTH CARE EDUCATION/TRAINING PROGRAM

## 2025-07-08 RX ORDER — OXYCODONE HYDROCHLORIDE 5 MG/1
5 TABLET ORAL EVERY 4 HOURS PRN
Refills: 0 | Status: ACTIVE | OUTPATIENT
Start: 2025-07-08 | End: 2025-07-09

## 2025-07-08 RX ORDER — VALACYCLOVIR HYDROCHLORIDE 500 MG/1
500 TABLET, FILM COATED ORAL 2 TIMES DAILY
Status: DISCONTINUED | OUTPATIENT
Start: 2025-07-08 | End: 2025-07-10 | Stop reason: HOSPADM

## 2025-07-08 RX ORDER — OXYCODONE HYDROCHLORIDE 10 MG/1
10 TABLET ORAL EVERY 4 HOURS PRN
Refills: 0 | Status: DISPENSED | OUTPATIENT
Start: 2025-07-08 | End: 2025-07-09

## 2025-07-08 RX ADMIN — ACETAMINOPHEN 650 MG: 325 TABLET ORAL at 08:07

## 2025-07-08 RX ADMIN — IBUPROFEN 800 MG: 400 TABLET ORAL at 02:07

## 2025-07-08 RX ADMIN — OXYCODONE HYDROCHLORIDE 10 MG: 10 TABLET ORAL at 02:07

## 2025-07-08 RX ADMIN — SIMETHICONE 80 MG: 80 TABLET, CHEWABLE ORAL at 06:07

## 2025-07-08 RX ADMIN — BUPROPION HYDROCHLORIDE 300 MG: 150 TABLET, EXTENDED RELEASE ORAL at 08:07

## 2025-07-08 RX ADMIN — ACETAMINOPHEN 650 MG: 325 TABLET ORAL at 02:07

## 2025-07-08 RX ADMIN — VALACYCLOVIR HYDROCHLORIDE 500 MG: 500 TABLET, FILM COATED ORAL at 08:07

## 2025-07-08 RX ADMIN — PRENATAL VIT W/ FE FUMARATE-FA TAB 27-0.8 MG 1 TABLET: 27-0.8 TAB at 08:07

## 2025-07-08 RX ADMIN — DOCUSATE SODIUM 200 MG: 100 CAPSULE, LIQUID FILLED ORAL at 08:07

## 2025-07-08 RX ADMIN — KETOROLAC TROMETHAMINE 30 MG: 30 INJECTION, SOLUTION INTRAMUSCULAR at 02:07

## 2025-07-08 RX ADMIN — KETOROLAC TROMETHAMINE 30 MG: 30 INJECTION, SOLUTION INTRAMUSCULAR at 08:07

## 2025-07-08 RX ADMIN — OXYCODONE HYDROCHLORIDE 10 MG: 10 TABLET ORAL at 10:07

## 2025-07-08 RX ADMIN — OXYCODONE HYDROCHLORIDE 10 MG: 10 TABLET ORAL at 08:07

## 2025-07-08 RX ADMIN — OXYCODONE HYDROCHLORIDE 10 MG: 10 TABLET ORAL at 06:07

## 2025-07-08 NOTE — LACTATION NOTE
07/08/25 1040   Maternal Assessment   Breast Shape Bilateral:;round   Breast Density Bilateral:;soft   Areola Bilateral:;elastic  (bruising on areola)   Maternal Infant Feeding   Maternal Emotional State assist needed  (minimal)   Infant Positioning clutch/football   Signs of Milk Transfer audible swallow;infant jaw motion present   Latch Assistance yes  (minimal)     Pt reports breastfeeding is going well.  Assisted pt with position and minimal assistance with latch. Good tugs and pulls observed. Basic breastfeeding education provided. Questions answered.

## 2025-07-08 NOTE — ANESTHESIA POSTPROCEDURE EVALUATION
Anesthesia Post Evaluation    Patient: Soha Randhawa    Procedure(s) Performed: Procedure(s) (LRB):   SECTION (N/A)    Final Anesthesia Type: spinal      Patient location during evaluation: labor & delivery  Patient participation: Yes- Able to Participate  Level of consciousness: awake and alert, awake and oriented  Post-procedure vital signs: reviewed and stable  Pain management: adequate  Airway patency: patent  ANAND mitigation strategies: Multimodal analgesia and Use of major conduction anesthesia (spinal/epidural) or peripheral nerve block  PONV status at discharge: No PONV  Anesthetic complications: no      Cardiovascular status: blood pressure returned to baseline and hemodynamically stable  Respiratory status: unassisted, spontaneous ventilation and room air  Hydration status: euvolemic  Follow-up not needed.              Vitals Value Taken Time   /68 25 07:52   Temp 36.8 °C (98.2 °F) 25 07:52   Pulse 69 25 07:52   Resp 20 25 14:56   SpO2 96 % 25 07:52         Event Time   Out of Recovery 10:03:00         Pain/Stanford Score: Pain Rating Prior to Med Admin: 7 (2025  2:56 PM)  Pain Rating Post Med Admin: 4 (2025 11:30 AM)

## 2025-07-08 NOTE — PLAN OF CARE
Pt will identify early hunger cues and respond.   Pt will breastfeed baby on cue, or about 8 or more in 24 hours.  Pt will observe adequate milk transfer- audible/ visual swallows, enough wet and dirty diaper, satisfaction cues and hydration status.   Pt will give expressed breast milk if latch is not yet fully efficient.   Pt will call for further help as needed-

## 2025-07-09 PROCEDURE — 25000003 PHARM REV CODE 250

## 2025-07-09 PROCEDURE — 11000001 HC ACUTE MED/SURG PRIVATE ROOM

## 2025-07-09 PROCEDURE — 25000003 PHARM REV CODE 250: Performed by: STUDENT IN AN ORGANIZED HEALTH CARE EDUCATION/TRAINING PROGRAM

## 2025-07-09 PROCEDURE — 25000003 PHARM REV CODE 250: Performed by: OBSTETRICS & GYNECOLOGY

## 2025-07-09 RX ORDER — OXYCODONE HYDROCHLORIDE 5 MG/1
5 TABLET ORAL EVERY 4 HOURS PRN
Refills: 0 | Status: ACTIVE | OUTPATIENT
Start: 2025-07-09 | End: 2025-07-10

## 2025-07-09 RX ORDER — OXYCODONE HYDROCHLORIDE 5 MG/1
5 TABLET ORAL EVERY 4 HOURS PRN
Qty: 20 TABLET | Refills: 0 | Status: SHIPPED | OUTPATIENT
Start: 2025-07-09

## 2025-07-09 RX ORDER — DOCUSATE SODIUM 100 MG/1
100 CAPSULE, LIQUID FILLED ORAL 2 TIMES DAILY
Qty: 60 CAPSULE | Refills: 1 | Status: SHIPPED | OUTPATIENT
Start: 2025-07-09

## 2025-07-09 RX ORDER — LIDOCAINE 50 MG/G
1 PATCH TOPICAL
Status: DISCONTINUED | OUTPATIENT
Start: 2025-07-09 | End: 2025-07-10 | Stop reason: HOSPADM

## 2025-07-09 RX ORDER — OXYCODONE HYDROCHLORIDE 10 MG/1
10 TABLET ORAL EVERY 4 HOURS PRN
Refills: 0 | Status: DISPENSED | OUTPATIENT
Start: 2025-07-09 | End: 2025-07-10

## 2025-07-09 RX ORDER — ACETAMINOPHEN 325 MG/1
650 TABLET ORAL EVERY 6 HOURS PRN
Qty: 30 TABLET | Refills: 1 | Status: SHIPPED | OUTPATIENT
Start: 2025-07-09

## 2025-07-09 RX ORDER — POLYETHYLENE GLYCOL 3350 17 G/17G
17 POWDER, FOR SOLUTION ORAL DAILY PRN
Status: DISCONTINUED | OUTPATIENT
Start: 2025-07-09 | End: 2025-07-10 | Stop reason: HOSPADM

## 2025-07-09 RX ORDER — IBUPROFEN 600 MG/1
600 TABLET, FILM COATED ORAL EVERY 6 HOURS PRN
Qty: 30 TABLET | Refills: 1 | Status: SHIPPED | OUTPATIENT
Start: 2025-07-09

## 2025-07-09 RX ADMIN — IBUPROFEN 800 MG: 400 TABLET ORAL at 08:07

## 2025-07-09 RX ADMIN — VALACYCLOVIR HYDROCHLORIDE 500 MG: 500 TABLET, FILM COATED ORAL at 09:07

## 2025-07-09 RX ADMIN — ACETAMINOPHEN 650 MG: 325 TABLET ORAL at 02:07

## 2025-07-09 RX ADMIN — DOCUSATE SODIUM 200 MG: 100 CAPSULE, LIQUID FILLED ORAL at 08:07

## 2025-07-09 RX ADMIN — OXYCODONE HYDROCHLORIDE 10 MG: 10 TABLET ORAL at 09:07

## 2025-07-09 RX ADMIN — VALACYCLOVIR HYDROCHLORIDE 500 MG: 500 TABLET, FILM COATED ORAL at 08:07

## 2025-07-09 RX ADMIN — ACETAMINOPHEN 650 MG: 325 TABLET ORAL at 07:07

## 2025-07-09 RX ADMIN — LIDOCAINE 1 PATCH: 50 PATCH TOPICAL at 08:07

## 2025-07-09 RX ADMIN — PRENATAL VIT W/ FE FUMARATE-FA TAB 27-0.8 MG 1 TABLET: 27-0.8 TAB at 09:07

## 2025-07-09 RX ADMIN — IBUPROFEN 800 MG: 400 TABLET ORAL at 01:07

## 2025-07-09 RX ADMIN — OXYCODONE HYDROCHLORIDE 10 MG: 10 TABLET ORAL at 01:07

## 2025-07-09 RX ADMIN — BUPROPION HYDROCHLORIDE 300 MG: 150 TABLET, EXTENDED RELEASE ORAL at 09:07

## 2025-07-09 RX ADMIN — OXYCODONE HYDROCHLORIDE 10 MG: 10 TABLET ORAL at 05:07

## 2025-07-09 RX ADMIN — ACETAMINOPHEN 650 MG: 325 TABLET ORAL at 09:07

## 2025-07-09 RX ADMIN — DOCUSATE SODIUM 200 MG: 100 CAPSULE, LIQUID FILLED ORAL at 09:07

## 2025-07-09 RX ADMIN — IBUPROFEN 800 MG: 400 TABLET ORAL at 05:07

## 2025-07-09 RX ADMIN — ACETAMINOPHEN 650 MG: 325 TABLET ORAL at 01:07

## 2025-07-09 RX ADMIN — POLYETHYLENE GLYCOL 3350 17 G: 17 POWDER, FOR SOLUTION ORAL at 01:07

## 2025-07-09 NOTE — PROGRESS NOTES
"POSTPARTUM PROGRESS NOTE    Subjective:     PPD/POD#: 2   Procedure: Repeat LTCS   EGA: 39w1d   N/V: No   F/C: No   Abd Pain: Mild, well-controlled with oral pain medication   Lochia: Mild   Voiding: Yes   Ambulating: Yes   Bowel fnc: Yes   Contraception: Declines      Patient doing well this morning. She said overnight she got up to go to the bathroom and had a brief pain episode. She has not had a bowel movement but has passed gas. She would like to try a bowel regime today.     Objective:      Temp:  [98 °F (36.7 °C)-98.2 °F (36.8 °C)] 98.1 °F (36.7 °C)  Pulse:  [69-80] 80  Resp:  [18-20] 18  SpO2:  [96 %-98 %] 96 %  BP: (100-108)/(64-68) 100/65    Abdomen: Soft, appropriately tender   Uterus: Firm, no fundal tenderness   Incision: Clean, dry, and intact.  No erythema, induration, or drainage.     Lab Review    No results for input(s): "NA", "K", "CL", "CO2", "BUN", "CREATININE", "GLU", "PROT", "BILITOT", "ALKPHOS", "ALT", "AST", "MG", "PHOS" in the last 168 hours.    Recent Labs   Lab 07/07/25  0528 07/08/25  0602   WBC 6.18 10.89   HGB 11.7* 10.7*   HCT 34.6* 31.6*   MCV 88 90    166         I/O  No intake or output data in the 24 hours ending 07/09/25 0622       Assessment and Plan:   Postpartum care:  - Patient doing well.  - Continue routine management and advances.    Obesity  - PrePreg BMI 32  - Encourage ambulation  - Lovenox: not indicated    Depression  - continue home med Wellbutrin   - mood stable  - [place]: 8    Paulo Carrasco MD  Obstetrics and Gynecology, PGY-1        "

## 2025-07-09 NOTE — LACTATION NOTE
07/09/25 1300   Maternal Assessment   Breast Shape Bilateral:;round   Breast Density Bilateral:;filling   Areola Bilateral:;elastic   Nipples Bilateral:;everted   Left Nipple Symptoms tender   Right Nipple Symptoms tender   Maternal Infant Feeding   Maternal Emotional State independent   Infant Positioning clutch/football   Signs of Milk Transfer audible swallow;infant jaw motion present   Latch Assistance no     Pt called for latch/breastfeeding check. Pt has baby latched to right breast in football hold independently. Good tugs and pulls observed. Pt to continue to breastfeeding baby on cue. Discussed baby's weight loss and recommended to ask for breastpump tonight if baby's weight decreases. Pt to pump after breastfeeding if needed and can supplement baby with her own breastmilk.  Support and encouragement given. Pt's questions answered. Reviewed basic breastfeeding education. Lc number on whiteboard, pt to call for assistance.

## 2025-07-09 NOTE — DISCHARGE SUMMARY
Delivery Discharge Summary  Obstetrics      Primary OB Clinician: Leah Johnson MD      Admission date: 2025  Discharge date: 2025    Disposition: To home, self care    Discharge Diagnosis List:    Problem List[1]    Procedure: Elective rLTCS.     Hospital Course:  Soha Randhawa is a 34 y.o. now , POD #3 who was admitted on 2025 at 39w1d for rLTCs. Patient was subsequently admitted to labor and delivery unit with signed consents.     Operative Labor course was uncomplicated and resulted in rLTCs without complications.       Please see delivery note for further details. Her postpartum course was complicated by [place] of 8. Patient agreed to postpartum mood check. On discharge day, patient's pain is controlled with oral pain medications. Pt is tolerating ambulation without SOB or CP, and regular diet without N/V. Reports lochia is mild. Denies any HA, vision changes, F/C, LE swelling. Denies any breast pain/soreness.    Pt in stable condition and ready for discharge. She has been instructed to start and/or continue medications and follow up with her obstetrics provider as listed below.    Vital Signs:   Temp:  [98 °F (36.7 °C)-98.2 °F (36.8 °C)] 98.2 °F (36.8 °C)  Pulse:  [71-80] 74  Resp:  [18-20] 20  SpO2:  [94 %-98 %] 94 %  BP: (100-115)/(64-70) 115/70    Physical Exam:  Abdomen: Soft, appropriately tender   Uterus: Firm, no fundal tenderness   Incision: Bandage in place without shadowing       Pertinent studies:  CBC  Recent Labs   Lab 25  0528 25  0602   WBC 6.18 10.89   HGB 11.7* 10.7*   HCT 34.6* 31.6*   MCV 88 90    166          Immunization History   Administered Date(s) Administered    Influenza - Quadrivalent - PF *Preferred* (6 months and older) 10/09/2020    Tdap 2020        Delivery:    Episiotomy:     Lacerations:     Repair suture:     Repair # of packets:     Blood loss (ml):       Birth information:  YOB: 2025   Time of  "birth: 7:31 AM   Sex: female   Delivery type: , Low Transverse   Gestational Age: 39w1d     Measurements    Weight: 3440 g  Weight (lbs): 7 lb 9.3 oz  Length: 51.4 cm  Length (in): 20.25"  Head circumference: 36 cm  Chest circumference: 35 cm         Delivery Clinician: Delivery Providers    Delivering clinician: Leah Johnson MD   Provider Role    Delfino Rinaldi RN Hillis, Emily A, RN Manzella, Macy, ST Chambers, Mary, MD              Additional  information:  Forceps:    Vacuum:    Breech:    Observed anomalies      Living?:     Apgars    Living status: Living  Apgar Component Scores:  1 min.:  5 min.:  10 min.:  15 min.:  20 min.:    Skin color:  0  1       Heart rate:  2  2       Reflex irritability:  2  2       Muscle tone:  2  2       Respiratory effort:  2  2       Total:  8  9       Apgars assigned by: VISHAL MONTALVO RN         Placenta: Delivered:       appearance        2025     9:00 AM 2021    10:17 AM 2020     8:50 AM   [place]  Depression Scale   I have been able to laugh and see the funny side of things. 0 0 0   I have looked forward with enjoyment to things. 1 1 0   I have blamed myself unnecessarily when things went wrong. 1 2 1   I have been anxious or worried for no good reason. 2 0 1   I have felt scared or panicky for no good reason. 2 0 0   Things have been getting on top of me. 1 2 1   I have been so unhappy that I have had difficulty sleeping. 0 1 0   I have felt sad or miserable. 1 1 0   I have been so unhappy that I have been crying. 0 0 0   The thought of harming myself has occurred to me. 0 0 0        Patient Instructions:   Current Discharge Medication List        START taking these medications    Details   acetaminophen (TYLENOL) 325 MG tablet Take 2 tablets (650 mg total) by mouth every 6 (six) hours as needed for Pain. Alternate between ibuprofen and tylenol every 3 hours. For example: @0800: ibuprofen 600mg @1100: tylenol 650mg @1400: " ibuprofen 600mg @1700: tylenol 650 mg @2000: ibuprofen 600mg  Qty: 30 tablet, Refills: 1      docusate sodium (COLACE) 100 MG capsule Take 1 capsule (100 mg total) by mouth 2 (two) times daily.  Qty: 60 capsule, Refills: 1      ibuprofen (ADVIL,MOTRIN) 600 MG tablet Take 1 tablet (600 mg total) by mouth every 6 (six) hours as needed for Other. Alternate between ibuprofen and tylenol every 3 hours. For example: @0800: ibuprofen 600mg @1100: tylenol 650mg @1400: ibuprofen 600mg @1700: tylenol 650 mg @2000: ibuprofen 600mg  Qty: 30 tablet, Refills: 1      oxyCODONE (ROXICODONE) 5 MG immediate release tablet Take 1 tablet (5 mg total) by mouth every 4 (four) hours as needed.  Qty: 20 tablet, Refills: 0    Comments: Quantity prescribed more than 7 day supply? No  Associated Diagnoses: S/P  section           CONTINUE these medications which have NOT CHANGED    Details   buPROPion (WELLBUTRIN XL) 300 MG 24 hr tablet Take 300 mg by mouth once daily.      omega-3 fatty acids/fish oil (FISH OIL-OMEGA-3 FATTY ACIDS) 300-1,000 mg capsule Take by mouth once daily.      valACYclovir (VALTREX) 500 MG tablet Take 1 tablet (500 mg total) by mouth 2 (two) times daily.  Qty: 180 tablet, Refills: 3           STOP taking these medications       magnesium oxide-Mg AA chelate 300 mg Cap Comments:   Reason for Stopping:         NON FORMULARY MEDICATION Comments:   Reason for Stopping:         prenatal 25/iron fum/folic/dha (PRENATAL-1 ORAL) Comments:   Reason for Stopping:               Discharge Procedure Orders   Diet Adult Regular     Lifting restrictions   Order Comments: No lifting more than 15 pounds for 6 weeks     No driving until:   Order Comments: - Not taking narcotic pain medications  - You feel that you can safely slam on the brakes     Pelvic Rest   Order Comments: Nothing in vagina until evaluation at postpartum visit (no sex, tampons, or douching)     No dressing needed     Notify your health care provider if you  experience any of the following:  temperature >100.4     Notify your health care provider if you experience any of the following:  persistent nausea and vomiting or diarrhea     Notify your health care provider if you experience any of the following:  severe uncontrolled pain     Notify your health care provider if you experience any of the following:  redness, tenderness, or signs of infection (pain, swelling, redness, odor or green/yellow discharge around incision site)     Notify your health care provider if you experience any of the following:  difficulty breathing or increased cough     Notify your health care provider if you experience any of the following:  severe persistent headache     Notify your health care provider if you experience any of the following:  worsening rash     Notify your health care provider if you experience any of the following:  persistent dizziness, light-headedness, or visual disturbances     Notify your health care provider if you experience any of the following:  increased confusion or weakness     Notify your health care provider if you experience any of the following:   Order Comments: - Heavy vaginal bleeding soaking through more than 2 pads/hour for > 2 hours  - Severe abdominal pain  - Drainage from your incision  - Headache not relieved with Tylenol  - Vision changes  - Chest pain or shortness or breath     Activity as tolerated     Shower on day dressing removed (No bath)   Order Comments: Do not submerge your incision in a bathtub until evaluation at postpartum visit        Follow-up Information       [place] Follow up in 2 week(s).    Specialty: Obstetrics and Gynecology  Why: 2 week post partum mood check.  Contact information:  [address] [address]  [phone]                                    Prisca Miller MD  Obstetrics and Gynecology, PGY-1          [1]   Patient Active Problem List  Diagnosis    Encounter for supervision of  normal pregnancy, antepartum    Genital HSV    Vapes non-nicotine containing substance    Depression, major, in remission    Obesity (BMI 30-39.9)    History of  section    S/P  section

## 2025-07-10 VITALS
OXYGEN SATURATION: 99 % | HEIGHT: 65 IN | HEART RATE: 73 BPM | DIASTOLIC BLOOD PRESSURE: 68 MMHG | WEIGHT: 222 LBS | SYSTOLIC BLOOD PRESSURE: 118 MMHG | RESPIRATION RATE: 17 BRPM | TEMPERATURE: 98 F | BODY MASS INDEX: 36.99 KG/M2

## 2025-07-10 PROCEDURE — 25000003 PHARM REV CODE 250

## 2025-07-10 PROCEDURE — 25000003 PHARM REV CODE 250: Performed by: OBSTETRICS & GYNECOLOGY

## 2025-07-10 RX ORDER — ADHESIVE BANDAGE
30 BANDAGE TOPICAL DAILY PRN
Status: COMPLETED | OUTPATIENT
Start: 2025-07-10 | End: 2025-07-10

## 2025-07-10 RX ORDER — OXYCODONE HYDROCHLORIDE 5 MG/1
5 TABLET ORAL EVERY 4 HOURS PRN
Qty: 20 TABLET | Refills: 0 | Status: SHIPPED | OUTPATIENT
Start: 2025-07-10

## 2025-07-10 RX ADMIN — ACETAMINOPHEN 650 MG: 325 TABLET ORAL at 01:07

## 2025-07-10 RX ADMIN — OXYCODONE HYDROCHLORIDE 10 MG: 10 TABLET ORAL at 01:07

## 2025-07-10 RX ADMIN — IBUPROFEN 800 MG: 400 TABLET ORAL at 05:07

## 2025-07-10 RX ADMIN — OXYCODONE HYDROCHLORIDE 10 MG: 10 TABLET ORAL at 05:07

## 2025-07-10 RX ADMIN — OXYCODONE HYDROCHLORIDE 10 MG: 10 TABLET ORAL at 09:07

## 2025-07-10 RX ADMIN — IBUPROFEN 800 MG: 400 TABLET ORAL at 01:07

## 2025-07-10 RX ADMIN — VALACYCLOVIR HYDROCHLORIDE 500 MG: 500 TABLET, FILM COATED ORAL at 08:07

## 2025-07-10 RX ADMIN — MAGNESIUM HYDROXIDE 2400 MG: 400 SUSPENSION ORAL at 10:07

## 2025-07-10 RX ADMIN — BUPROPION HYDROCHLORIDE 300 MG: 150 TABLET, EXTENDED RELEASE ORAL at 08:07

## 2025-07-10 RX ADMIN — DOCUSATE SODIUM 200 MG: 100 CAPSULE, LIQUID FILLED ORAL at 08:07

## 2025-07-10 RX ADMIN — PRENATAL VIT W/ FE FUMARATE-FA TAB 27-0.8 MG 1 TABLET: 27-0.8 TAB at 08:07

## 2025-07-10 RX ADMIN — ACETAMINOPHEN 650 MG: 325 TABLET ORAL at 08:07

## 2025-07-10 NOTE — LACTATION NOTE
07/10/25 0900   Maternal Assessment   Breast Shape Bilateral:;round   Breast Density Bilateral:;filling   Areola Bilateral:;elastic   Nipples Bilateral:;everted   Left Nipple Symptoms tender   Right Nipple Symptoms tender   Maternal Infant Feeding   Maternal Emotional State independent   Infant Positioning clutch/football   Signs of Milk Transfer audible swallow;infant jaw motion present   Latch Assistance no   Community Referrals   Community Referrals outpatient lactation program;support group  (community resorces)     Pt able to latch baby to breast independently. Good tugs and pulls observed. Pt's breast are filling. Educated pt on the prevention and treatment of engorgement. Discharge lactation education provided. Questions answered. Pt has lactation contact number and community resources.

## 2025-07-10 NOTE — PROGRESS NOTES
"POSTPARTUM PROGRESS NOTE    Subjective:     PPD/POD#: 3   Procedure: Repeat LTCS   EGA: 39w1d   N/V: No   F/C: No   Abd Pain: Mild, well-controlled with oral pain medication   Lochia: Mild   Voiding: Yes   Ambulating: Yes   Bowel fnc: Yes   Contraception: Declines      Patient doing well this morning. Patient is ambulating well, walking around he [place] and has had a bowel movement.     Objective:      Temp:  [97.8 °F (36.6 °C)-98.2 °F (36.8 °C)] 97.8 °F (36.6 °C)  Pulse:  [74-77] 77  Resp:  [18-20] 18  SpO2:  [94 %-98 %] 96 %  BP: ()/(56-78) 94/56    Abdomen: Soft, appropriately tender   Uterus: Firm, no fundal tenderness   Incision: Clean, dry, and intact.  No erythema, induration, or drainage.     Lab Review    No results for input(s): "NA", "K", "CL", "CO2", "BUN", "CREATININE", "GLU", "PROT", "BILITOT", "ALKPHOS", "ALT", "AST", "MG", "PHOS" in the last 168 hours.    Recent Labs   Lab 07/07/25  0528 07/08/25  0602   WBC 6.18 10.89   HGB 11.7* 10.7*   HCT 34.6* 31.6*   MCV 88 90    166         I/O  No intake or output data in the 24 hours ending 07/10/25 0630       Assessment and Plan:   Postpartum care:  - Patient doing well.  - Continue routine management and advances.    Obesity  - PrePreg BMI 32  - Encourage ambulation  - Lovenox: not indicated    Depression  - continue home med Wellbutrin   - mood stable  - [place]: 8          Prisca Miller MD  Obstetrics and Gynecology, PGY-1        "

## 2025-07-10 NOTE — DISCHARGE SUMMARY
Delivery Discharge Summary  Obstetrics      Primary OB Clinician: Leah Johnson MD      Admission date: 2025  Discharge date: 07/10/2025    Disposition: To home, self care    Discharge Diagnosis List:    Problem List[1]    Procedure: Elective rLTCS.     Hospital Course:  Soha Randhawa is a 34 y.o. now , POD #3 who was admitted on 2025 at 39w1d for rLTCs. Patient was subsequently admitted to labor and delivery unit with signed consents.     Operative Labor course was uncomplicated and resulted in rLTCs without complications.       Please see delivery note for further details. Her postpartum course was complicated by edinburgh of 8. Patient agreed to postpartum mood check. On discharge day, patient's pain is controlled with oral pain medications. Pt is tolerating ambulation without SOB or CP, and regular diet without N/V. Reports lochia is mild. Denies any HA, vision changes, F/C, LE swelling. Denies any breast pain/soreness.    Pt in stable condition and ready for discharge. She has been instructed to start and/or continue medications and follow up with her obstetrics provider as listed below.    Vital Signs:   Temp:  [97.8 °F (36.6 °C)-98.7 °F (37.1 °C)] 98.7 °F (37.1 °C)  Pulse:  [71-77] 71  Resp:  [17-20] 17  SpO2:  [96 %-100 %] 100 %  BP: ()/(56-78) 112/72    Physical Exam:  Abdomen: Soft, appropriately tender   Uterus: Firm, no fundal tenderness   Incision: Bandage in place without shadowing       Pertinent studies:  CBC  Recent Labs   Lab 25  0528 25  0602   WBC 6.18 10.89   HGB 11.7* 10.7*   HCT 34.6* 31.6*   MCV 88 90    166          Immunization History   Administered Date(s) Administered    Influenza - Quadrivalent - PF *Preferred* (6 months and older) 10/09/2020    Tdap 2020        Delivery:    Episiotomy:     Lacerations:     Repair suture:     Repair # of packets:     Blood loss (ml):       Birth information:  YOB: 2025   Time of  "birth: 7:31 AM   Sex: female   Delivery type: , Low Transverse   Gestational Age: 39w1d     Measurements    Weight: 3440 g  Weight (lbs): 7 lb 9.3 oz  Length: 51.4 cm  Length (in): 20.25"  Head circumference: 36 cm  Chest circumference: 35 cm         Delivery Clinician: Delivery Providers    Delivering clinician: Leah Johnson MD   Provider Role    Delfino Rinaldi RN Hillis, Emily A, RN Manzella, Macy, ST Chambers, Mary, MD              Additional  information:  Forceps:    Vacuum:    Breech:    Observed anomalies      Living?:     Apgars    Living status: Living  Apgar Component Scores:  1 min.:  5 min.:  10 min.:  15 min.:  20 min.:    Skin color:  0  1       Heart rate:  2  2       Reflex irritability:  2  2       Muscle tone:  2  2       Respiratory effort:  2  2       Total:  8  9       Apgars assigned by: VISHAL MONTALVO RN         Placenta: Delivered:       appearance        2025     9:00 AM 2021    10:17 AM 2020     8:50 AM   [place]  Depression Scale   I have been able to laugh and see the funny side of things. 0 0 0   I have looked forward with enjoyment to things. 1 1 0   I have blamed myself unnecessarily when things went wrong. 1 2 1   I have been anxious or worried for no good reason. 2 0 1   I have felt scared or panicky for no good reason. 2 0 0   Things have been getting on top of me. 1 2 1   I have been so unhappy that I have had difficulty sleeping. 0 1 0   I have felt sad or miserable. 1 1 0   I have been so unhappy that I have been crying. 0 0 0   The thought of harming myself has occurred to me. 0 0 0        Patient Instructions:   Current Discharge Medication List        START taking these medications    Details   acetaminophen (TYLENOL) 325 MG tablet Take 2 tablets (650 mg total) by mouth every 6 (six) hours as needed for Pain. Alternate between ibuprofen and tylenol every 3 hours. For example: @0800: ibuprofen 600mg @1100: tylenol 650mg @1400: " ibuprofen 600mg @1700: tylenol 650 mg @2000: ibuprofen 600mg  Qty: 30 tablet, Refills: 1      docusate sodium (COLACE) 100 MG capsule Take 1 capsule (100 mg total) by mouth 2 (two) times daily.  Qty: 60 capsule, Refills: 1      ibuprofen (ADVIL,MOTRIN) 600 MG tablet Take 1 tablet (600 mg total) by mouth every 6 (six) hours as needed for Other. Alternate between ibuprofen and tylenol every 3 hours. For example: @0800: ibuprofen 600mg @1100: tylenol 650mg @1400: ibuprofen 600mg @1700: tylenol 650 mg @2000: ibuprofen 600mg  Qty: 30 tablet, Refills: 1      oxyCODONE (ROXICODONE) 5 MG immediate release tablet Take 1 tablet (5 mg total) by mouth every 4 (four) hours as needed.  Qty: 20 tablet, Refills: 0    Comments: Quantity prescribed more than 7 day supply? No  Associated Diagnoses: S/P  section           CONTINUE these medications which have NOT CHANGED    Details   buPROPion (WELLBUTRIN XL) 300 MG 24 hr tablet Take 300 mg by mouth once daily.      omega-3 fatty acids/fish oil (FISH OIL-OMEGA-3 FATTY ACIDS) 300-1,000 mg capsule Take by mouth once daily.      valACYclovir (VALTREX) 500 MG tablet Take 1 tablet (500 mg total) by mouth 2 (two) times daily.  Qty: 180 tablet, Refills: 3           STOP taking these medications       magnesium oxide-Mg AA chelate 300 mg Cap Comments:   Reason for Stopping:         NON FORMULARY MEDICATION Comments:   Reason for Stopping:         prenatal 25/iron fum/folic/dha (PRENATAL-1 ORAL) Comments:   Reason for Stopping:               Discharge Procedure Orders   Diet Adult Regular     Diet Adult Regular     Diet Adult Regular     Lifting restrictions   Order Comments: No lifting more than 15 pounds for 6 weeks     No driving until:   Order Comments: - Not taking narcotic pain medications  - You feel that you can safely slam on the brakes     Pelvic Rest   Order Comments: Nothing in vagina until evaluation at postpartum visit (no sex, tampons, or douching)     No dressing needed      Notify your health care provider if you experience any of the following:  temperature >100.4     Notify your health care provider if you experience any of the following:  persistent nausea and vomiting or diarrhea     Notify your health care provider if you experience any of the following:  severe uncontrolled pain     Notify your health care provider if you experience any of the following:  redness, tenderness, or signs of infection (pain, swelling, redness, odor or green/yellow discharge around incision site)     Notify your health care provider if you experience any of the following:  difficulty breathing or increased cough     Notify your health care provider if you experience any of the following:  severe persistent headache     Notify your health care provider if you experience any of the following:  worsening rash     Notify your health care provider if you experience any of the following:  persistent dizziness, light-headedness, or visual disturbances     Notify your health care provider if you experience any of the following:  increased confusion or weakness     Notify your health care provider if you experience any of the following:   Order Comments: - Heavy vaginal bleeding soaking through more than 2 pads/hour for > 2 hours  - Severe abdominal pain  - Drainage from your incision  - Headache not relieved with Tylenol  - Vision changes  - Chest pain or shortness or breath     Lifting restrictions   Order Comments: No lifting more than 15 pounds for 6 weeks     No driving until:   Order Comments: - Not taking narcotic pain medications  - You feel that you can safely slam on the brakes     Pelvic Rest   Order Comments: Nothing in vagina until evaluation at postpartum visit (no sex, tampons, or douching)     No dressing needed     Notify your health care provider if you experience any of the following:  temperature >100.4     Notify your health care provider if you experience any of the following:   persistent nausea and vomiting or diarrhea     Notify your health care provider if you experience any of the following:  severe uncontrolled pain     Notify your health care provider if you experience any of the following:  redness, tenderness, or signs of infection (pain, swelling, redness, odor or green/yellow discharge around incision site)     Notify your health care provider if you experience any of the following:  difficulty breathing or increased cough     Notify your health care provider if you experience any of the following:  severe persistent headache     Notify your health care provider if you experience any of the following:  worsening rash     Notify your health care provider if you experience any of the following:  persistent dizziness, light-headedness, or visual disturbances     Notify your health care provider if you experience any of the following:  increased confusion or weakness     Notify your health care provider if you experience any of the following:   Order Comments: - Heavy vaginal bleeding soaking through more than 2 pads/hour for > 2 hours  - Severe abdominal pain  - Drainage from your incision  - Headache not relieved with Tylenol  - Vision changes  - Chest pain or shortness or breath     Lifting restrictions   Order Comments: No lifting more than 15 pounds for 6 weeks     No driving until:   Order Comments: - Not taking narcotic pain medications  - You feel that you can safely slam on the brakes     Pelvic Rest   Order Comments: Nothing in vagina until evaluation at postpartum visit (no sex, tampons, or douching)     No dressing needed     Notify your health care provider if you experience any of the following:  temperature >100.4     Notify your health care provider if you experience any of the following:  persistent nausea and vomiting or diarrhea     Notify your health care provider if you experience any of the following:  severe uncontrolled pain     Notify your health care  provider if you experience any of the following:  redness, tenderness, or signs of infection (pain, swelling, redness, odor or green/yellow discharge around incision site)     Notify your health care provider if you experience any of the following:  difficulty breathing or increased cough     Notify your health care provider if you experience any of the following:  severe persistent headache     Notify your health care provider if you experience any of the following:  worsening rash     Notify your health care provider if you experience any of the following:  persistent dizziness, light-headedness, or visual disturbances     Notify your health care provider if you experience any of the following:  increased confusion or weakness     Notify your health care provider if you experience any of the following:   Order Comments: - Heavy vaginal bleeding soaking through more than 2 pads/hour for > 2 hours  - Severe abdominal pain  - Drainage from your incision  - Headache not relieved with Tylenol  - Vision changes  - Chest pain or shortness or breath     Activity as tolerated     Shower on day dressing removed (No bath)   Order Comments: Do not submerge your incision in a bathtub until evaluation at postpartum visit        Follow-up Information       Leah Johnson MD Follow up in 2 week(s).    Specialty: Obstetrics and Gynecology  Why: 2 week post partum mood check.  Contact information:  [address] [id number] [phone]               Leah Johnson MD Follow up in 6 week(s).    Specialty: Obstetrics and Gynecology  Why: 6 week post partum visit  Contact information:  [address] [id number] [phone]                              Mackenzie Jewell MD (Mary)   Obstetrics and Gynecology, PGY2               [1]   Patient Active Problem List  Diagnosis    Encounter for supervision of normal pregnancy, antepartum    Genital HSV    Vapes non-nicotine containing  substance    Depression, major, in remission    Obesity (BMI 30-39.9)    History of  section    S/P  section

## 2025-07-11 ENCOUNTER — PATIENT MESSAGE (OUTPATIENT)
Dept: OBSTETRICS AND GYNECOLOGY | Facility: OTHER | Age: 35
End: 2025-07-11
Payer: COMMERCIAL

## 2025-07-16 ENCOUNTER — PATIENT MESSAGE (OUTPATIENT)
Dept: OBSTETRICS AND GYNECOLOGY | Facility: CLINIC | Age: 35
End: 2025-07-16
Payer: COMMERCIAL

## 2025-07-17 ENCOUNTER — PATIENT MESSAGE (OUTPATIENT)
Dept: OBSTETRICS AND GYNECOLOGY | Facility: CLINIC | Age: 35
End: 2025-07-17
Payer: COMMERCIAL

## 2025-07-21 ENCOUNTER — OFFICE VISIT (OUTPATIENT)
Dept: OBSTETRICS AND GYNECOLOGY | Facility: CLINIC | Age: 35
End: 2025-07-21
Payer: COMMERCIAL

## 2025-07-21 DIAGNOSIS — Z13.32 ENCOUNTER FOR SCREENING FOR MATERNAL DEPRESSION: ICD-10-CM

## 2025-07-21 PROCEDURE — 98005 SYNCH AUDIO-VIDEO EST LOW 20: CPT | Mod: 95,,, | Performed by: NURSE PRACTITIONER

## 2025-07-21 NOTE — PROGRESS NOTES
The patient location is: [place]  The chief complaint leading to consultation is: pp mood check    Visit type: audiovisual    Face to Face time with patient: 8  20 minutes of total time spent on the encounter, which includes face to face time and non-face to face time preparing to see the patient (eg, review of tests), Obtaining and/or reviewing separately obtained history, Documenting clinical information in the electronic or other health record, Independently interpreting results (not separately reported) and communicating results to the patient/family/caregiver, or Care coordination (not separately reported).     Each patient to whom he or she provides medical services by telemedicine is:  (1) informed of the relationship between the physician and patient and the respective role of any other health care provider with respect to management of the patient; and (2) notified that he or she may decline to receive medical services by telemedicine and may withdraw from such care at any time.    Notes:                 Postpartum Visit  Soha Randhawa is a 35 y.o. female  is here for a postpartum mood check. She is 2 weeks postpartum following a low cervical transverse  section, with Anesthesia: spinal. The delivery was at 39w1d. Doing well, mood is good. Partner returns to work tomorrow, has family that is local and helpful. Still taking wellbutrin. She is pumping and supplementing with formula, fu peds visit this week. Pain is controlled, only taking motrin. Light vaginal bleeding. Night nanny starting soon which will be helpful too.      Question 2025  1:16 PM CDT - Filed by Patient   I have been able to laugh and see the funny side of things. As much as I always could   I have looked forward with enjoyment to things. As much as I ever did   I have blamed myself unnecessarily when things went wrong. Yes, some of the time   I have been anxious or worried for no good reason. Yes, sometimes    I have felt scared or panicky for no good reason. No, not much   Things have been getting on top of me. No, most of the time I have coped quite well   I have been so unhappy that I have had difficulty sleeping. Not at all   I have felt sad or miserable. No, not at all   I have been so unhappy that I have been crying. No, never   The thought of harming myself has occurred to me. Never           2025  1:17 PM CDT - Filed by Patient    Over the last 2 weeks, how often have you been bothered by the following problems?    Feeling nervous, anxious, or on edge Several days   Not being able to stop or control worrying Several days   Worrying too much about different things Several days   Trouble relaxing Not at all   Being so restless that it is hard to sit still Not at all   Becoming easily annoyed or irritable Several days   Feeling afraid as if something awful might happen Several days   If you checked off any problems on this questionnaire, how difficult have these problems made it for you to do your work, take care of things at home, or get along with other people? Somewhat difficult       OB History    Para Term  AB Living   2 2 2   2   SAB IAB Ectopic Multiple Live Births      0 2      # Outcome Date GA Lbr Sarthak/2nd Weight Sex Type Anes PTL Lv   2 Term 25 39w1d  3.44 kg (7 lb 9.3 oz) F CS-LTranv Spinal N JJ   1 Term 20 40w3d  3.77 kg (8 lb 5 oz) F CS-LTranv EPI N JJ     Postpartum depression screening: negative- score of 5.  GILDARDO-7 score of 5 showing mild anxiety    ROS:  GENERAL: No fever, chills, fatigability.  VULVAR: No pain, no lesions and no itching.  VAGINAL: No relaxation, no itching, no discharge, no abnormal bleeding and no lesions.  ABDOMEN: No abdominal pain. Denies nausea. Denies vomiting. No diarrhea. No constipation  BREAST: Denies pain. No lumps. No discharge.  URINARY: No incontinence, no nocturia, no frequency and no dysuria.  CARDIOVASCULAR: No chest pain. No  shortness of breath. No leg cramps.  NEUROLOGICAL: No headaches. No vision changes.      General appearance - alert, well appearing, and in no distress, oriented to person, place, and time, and normal appearing weight  Mental status - alert, oriented to person, place, and time, normal mood, behavior, speech, dress, motor activity, and thought processes  Skin - coloration normal for race, good turgor, warm to touch, no rashes      Diagnoses and all orders for this visit:    Encounter for postpartum visit    Encounter for screening for maternal depression        Counseling regarding resuming normal activities of exercise and work.  Postpartum precautions reviewed    RTC 8/18/25 with Dr. Johnson

## 2025-07-29 ENCOUNTER — TELEPHONE (OUTPATIENT)
Dept: OBSTETRICS AND GYNECOLOGY | Facility: CLINIC | Age: 35
End: 2025-07-29
Payer: COMMERCIAL

## 2025-07-29 ENCOUNTER — PATIENT MESSAGE (OUTPATIENT)
Dept: OBSTETRICS AND GYNECOLOGY | Facility: CLINIC | Age: 35
End: 2025-07-29
Payer: COMMERCIAL

## 2025-07-29 DIAGNOSIS — F41.8 POSTPARTUM ANXIETY: Primary | ICD-10-CM

## 2025-07-29 RX ORDER — FLUOXETINE 20 MG/1
20 CAPSULE ORAL DAILY
Qty: 90 CAPSULE | Refills: 3 | Status: SHIPPED | OUTPATIENT
Start: 2025-07-29 | End: 2026-07-29

## 2025-07-29 NOTE — TELEPHONE ENCOUNTER
Hey- FYI. Pp pt of yours- I saw her last week for a PP mood check. Already on medication and GILDARDO just showed mild anxiety. She reached out again toady and then this last message from her .

## 2025-07-29 NOTE — TELEPHONE ENCOUNTER
Copied from CRM #[id number]. Topic: Appointments - Appointment Confirmation  >> Jul 29, 2025 12:55 PM Mike wrote:  Type: Patient Call Back    Who called: Self     What is the request in detail: pt called in regards to speaking to the doctor about postpartum anxiety. Would like a call back.     Can the clinic reply by [facility]? No     Would the patient rather a call back or a response via [facility]? Call back     Best call back number:[phone]     Additional Information:    Thank you.

## 2025-07-30 ENCOUNTER — OFFICE VISIT (OUTPATIENT)
Dept: OBSTETRICS AND GYNECOLOGY | Facility: CLINIC | Age: 35
End: 2025-07-30
Payer: COMMERCIAL

## 2025-07-30 ENCOUNTER — TELEPHONE (OUTPATIENT)
Dept: PSYCHIATRY | Facility: CLINIC | Age: 35
End: 2025-07-30
Payer: COMMERCIAL

## 2025-07-30 DIAGNOSIS — F41.8 POSTPARTUM ANXIETY: ICD-10-CM

## 2025-07-30 PROCEDURE — 98005 SYNCH AUDIO-VIDEO EST LOW 20: CPT | Mod: 95,,, | Performed by: NURSE PRACTITIONER

## 2025-07-30 NOTE — TELEPHONE ENCOUNTER
Spoke to patient about  counseling program. Pt states that they have developed a plan including seeing a psych at  with [facility]. Pt would still need a therapist for support. Pt scheduled with LCSW's earlier available which is  at 8AM. LMSW told pt to save her number and to call if any support is needed between now and then.

## 2025-07-30 NOTE — PROGRESS NOTES
Spoke to patient's   Pt was having infanticidal thoughts. Brought to [facility] for eval. Not admitted due to availability of 24 hour sitter at home and close follow up  She is on wellbutrin 300 and was on SSRI before pregnancy. She was told to restart fluoxetine 20    Will start fluoxetine tonight and make sure follow up is close  Will follow up andre  few days

## 2025-07-30 NOTE — PROGRESS NOTES
"The patient location is: [place]  The chief complaint leading to consultation is: pp     Visit type: audiovisual    Face to Face time with patient: 20  35 minutes of total time spent on the encounter, which includes face to face time and non-face to face time preparing to see the patient (eg, review of tests), Obtaining and/or reviewing separately obtained history, Documenting clinical information in the electronic or other health record, Independently interpreting results (not separately reported) and communicating results to the patient/family/caregiver, or Care coordination (not separately reported).     Each patient to whom he or she provides medical services by telemedicine is:  (1) informed of the relationship between the physician and patient and the respective role of any other health care provider with respect to management of the patient; and (2) notified that he or she may decline to receive medical services by telemedicine and may withdraw from such care at any time.    Notes:                 EDPS score of 19  Postpartum Visit  Soha Randhawa is a 35 y.o. female  is here for a postpartum mood check in. A few days ago she noticed her anxiety was increasing and now was about "everything". Yesterday she told her  about some intrusive thoughts she had about the baby and he immediately brought her to [facility] for evaluation. She was given the option to be discharged and do outpatient therapy versus inpatient.     Dr. Johnson sent in Prozac 20 mg yesterday- took the first dose today. Pharmacist told her 5 days to see improvement. Cut Wellbutrin in half this morning from 300 to 150 mg, concerned it was contributing to her anxiety. This happened first pregnancy. Scheduled to see psychiatrist 25 @ 11 am- does online visits [facility]     Feels like brain is constant- anxious about everything. Partner is supportive and aware. Plans to switch to formula feed. Got " a good night's sleep last night. Family is now with her , aunt is at the house now. Aunt will be there the rest of the week, night nanny from 8p-6a then aunt relieves her. Trying to release some of the control to other people, know when to go lie down and rest. Constant thoughts are better but now has the guilt of thinking those intrusive thoughts. Never felt disconnected with baby.     OB History    Para Term  AB Living   2 2 2   2   SAB IAB Ectopic Multiple Live Births      0 2      # Outcome Date GA Lbr Sarthak/2nd Weight Sex Type Anes PTL Lv   2 Term 25 39w1d  3.44 kg (7 lb 9.3 oz) F CS-LTranv Spinal N JJ   1 Term 20 40w3d  3.77 kg (8 lb 5 oz) F CS-LTranv EPI N JJ             ROS:  GENERAL: No fever, chills, fatigability.  VULVAR: No pain, no lesions and no itching.  VAGINAL: No relaxation, no itching, no discharge, no abnormal bleeding and no lesions.  ABDOMEN: No abdominal pain. Denies nausea. Denies vomiting. No diarrhea. No constipation  BREAST: Denies pain. No lumps. No discharge.  URINARY: No incontinence, no nocturia, no frequency and no dysuria.  CARDIOVASCULAR: No chest pain. No shortness of breath. No leg cramps.  NEUROLOGICAL: No headaches. No vision changes.      General appearance - alert, well appearing, and in no distress, oriented to person, place, and time, and anxious  Mental status - alert, oriented to person, place, and time, depressed mood, anxious      Diagnoses and all orders for this visit:    Postpartum depression    Postpartum anxiety        C/w meds as directed  Keep psych visit   Will receive phone call to get in with therapy  Keep visit  with

## 2025-07-31 ENCOUNTER — PATIENT MESSAGE (OUTPATIENT)
Dept: OBSTETRICS AND GYNECOLOGY | Facility: CLINIC | Age: 35
End: 2025-07-31
Payer: COMMERCIAL

## 2025-08-11 ENCOUNTER — OFFICE VISIT (OUTPATIENT)
Dept: PSYCHIATRY | Facility: CLINIC | Age: 35
End: 2025-08-11
Payer: COMMERCIAL

## 2025-08-11 DIAGNOSIS — F41.8 POSTPARTUM ANXIETY: ICD-10-CM

## 2025-08-11 PROCEDURE — 90791 PSYCH DIAGNOSTIC EVALUATION: CPT | Mod: 95,,, | Performed by: SOCIAL WORKER

## 2025-08-18 ENCOUNTER — POSTPARTUM VISIT (OUTPATIENT)
Dept: OBSTETRICS AND GYNECOLOGY | Facility: CLINIC | Age: 35
End: 2025-08-18
Payer: COMMERCIAL

## 2025-08-18 VITALS
SYSTOLIC BLOOD PRESSURE: 108 MMHG | BODY MASS INDEX: 34.01 KG/M2 | HEIGHT: 65 IN | DIASTOLIC BLOOD PRESSURE: 70 MMHG | WEIGHT: 204.13 LBS

## 2025-08-18 DIAGNOSIS — Z13.32 ENCOUNTER FOR SCREENING FOR MATERNAL DEPRESSION: Primary | ICD-10-CM

## 2025-08-18 DIAGNOSIS — Z98.891 S/P CESAREAN SECTION: ICD-10-CM

## 2025-08-18 PROCEDURE — 99999 PR PBB SHADOW E&M-EST. PATIENT-LVL III: CPT | Mod: PBBFAC,,, | Performed by: STUDENT IN AN ORGANIZED HEALTH CARE EDUCATION/TRAINING PROGRAM

## 2025-08-18 RX ORDER — DROSPIRENONE 4 MG/1
4 TABLET, FILM COATED ORAL DAILY
Qty: 84 TABLET | Refills: 3 | Status: SHIPPED | OUTPATIENT
Start: 2025-08-18 | End: 2026-08-18

## 2025-08-19 ENCOUNTER — OFFICE VISIT (OUTPATIENT)
Dept: PSYCHIATRY | Facility: CLINIC | Age: 35
End: 2025-08-19
Payer: COMMERCIAL

## 2025-08-19 ENCOUNTER — PATIENT MESSAGE (OUTPATIENT)
Dept: PSYCHIATRY | Facility: CLINIC | Age: 35
End: 2025-08-19
Payer: COMMERCIAL

## 2025-08-19 DIAGNOSIS — F41.8 POSTPARTUM ANXIETY: Primary | ICD-10-CM

## 2025-08-19 PROCEDURE — 90837 PSYTX W PT 60 MINUTES: CPT | Mod: 95,,, | Performed by: SOCIAL WORKER

## 2025-08-26 ENCOUNTER — OFFICE VISIT (OUTPATIENT)
Dept: PSYCHIATRY | Facility: CLINIC | Age: 35
End: 2025-08-26
Payer: COMMERCIAL

## 2025-08-26 ENCOUNTER — PATIENT MESSAGE (OUTPATIENT)
Dept: PSYCHIATRY | Facility: CLINIC | Age: 35
End: 2025-08-26
Payer: COMMERCIAL

## 2025-08-26 DIAGNOSIS — F41.8 POSTPARTUM ANXIETY: Primary | ICD-10-CM

## 2025-09-03 ENCOUNTER — OFFICE VISIT (OUTPATIENT)
Dept: PSYCHIATRY | Facility: CLINIC | Age: 35
End: 2025-09-03
Payer: COMMERCIAL

## 2025-09-03 DIAGNOSIS — F41.8 POSTPARTUM ANXIETY: Primary | ICD-10-CM
